# Patient Record
Sex: FEMALE | Race: BLACK OR AFRICAN AMERICAN | Employment: UNEMPLOYED | URBAN - METROPOLITAN AREA
[De-identification: names, ages, dates, MRNs, and addresses within clinical notes are randomized per-mention and may not be internally consistent; named-entity substitution may affect disease eponyms.]

---

## 2017-02-03 ENCOUNTER — OFFICE VISIT (OUTPATIENT)
Dept: INTERNAL MEDICINE CLINIC | Age: 22
End: 2017-02-03

## 2017-02-03 VITALS
BODY MASS INDEX: 30.28 KG/M2 | SYSTOLIC BLOOD PRESSURE: 114 MMHG | HEART RATE: 96 BPM | RESPIRATION RATE: 18 BRPM | TEMPERATURE: 98.6 F | HEIGHT: 66 IN | WEIGHT: 188.4 LBS | OXYGEN SATURATION: 99 % | DIASTOLIC BLOOD PRESSURE: 76 MMHG

## 2017-02-03 RX ORDER — BUSPIRONE HYDROCHLORIDE 5 MG/1
TABLET ORAL
COMMUNITY
End: 2017-02-21 | Stop reason: ALTCHOICE

## 2017-02-03 RX ORDER — ETONOGESTREL AND ETHINYL ESTRADIOL 11.7; 2.7 MG/1; MG/1
INSERT, EXTENDED RELEASE VAGINAL
COMMUNITY
End: 2017-02-21 | Stop reason: ALTCHOICE

## 2017-02-03 NOTE — PROGRESS NOTES
1. Have you been to the ER, urgent care clinic since your last visit? Hospitalized since your last visit? No    2. Have you seen or consulted any other health care providers outside of the [facility] since your last visit? Include any pap smears or colon screening. Yes When: 12/15 [place] [facility]. bladder problems.

## 2017-02-03 NOTE — PROGRESS NOTES
Cherelle Conti is a 24 y.o. female and presents with New Patient and Establish Care  . New pt  No CP, SOB, or edema. Regular menses. . Last pregnancy was 6 wks  Ago (s/p ). Started NuvaRing in December. Not breastfeeding. Sees psychiatry  (Dr. Mary Riggs) and first visit was . Review of Systems  Constitutional: negative for fevers, chills, anorexia and weight loss  Eyes:   negative for visual disturbance and irritation  ENT:   negative for tinnitus,sore throat,nasal congestion,ear pains. hoarseness  Respiratory:  negative for cough, hemoptysis, dyspnea,wheezing  CV:   negative for chest pain, palpitations, lower extremity edema  GI:   negative for nausea, vomiting, diarrhea, abdominal pain,melena  Endo:               negative for polyuria,polydipsia,polyphagia,heat intolerance  Genitourinary: negative for frequency, dysuria and hematuria  Integument:  negative for rash and pruritus  Hematologic:  negative for easy bruising and gum/nose bleeding  Musculoskel: negative for myalgias, arthralgias, back pain, muscle weakness, joint pain  Neurological:  negative for headaches, dizziness, vertigo, memory problems and gait   Behavl/Psych: negative for feelings of anxiety, depression, mood changes    Past Medical History   Diagnosis Date    Anxiety     Depression     PTSD (post-traumatic stress disorder)     Suicidal intent      History reviewed. No pertinent past surgical history. Social History     Social History    Marital status: SINGLE     Spouse name: N/A    Number of children: N/A    Years of education: N/A     Social History Main Topics    Smoking status: Never Smoker    Smokeless tobacco: None    Alcohol use Yes      Comment: occ    Drug use: No    Sexual activity: Yes     Partners: Male     Other Topics Concern    None     Social History Narrative     Current Outpatient Prescriptions   Medication Sig Dispense Refill    busPIRone (BUSPAR) 5 mg tablet Take  by mouth.       ethinyl estradiol-etonogestrel (NUVARING) 0.12-0.015 mg/24 hr vaginal ring by Intravaginal route. No Known Allergies    Objective:  Visit Vitals    /76 (BP 1 Location: Left arm, BP Patient Position: Sitting)    Pulse 96    Temp 98.6 °F (37 °C) (Oral)    Resp 18    Ht 5' 6\" (1.676 m)    Wt 188 lb 6.4 oz (85.5 kg)    LMP 01/09/2017    SpO2 99%    BMI 30.41 kg/m2     Physical Exam:   General appearance - alert, well appearing, and in no distress  Mental status - alert, oriented to person, place, and time  Chest - clear to auscultation, no wheezes, rales or rhonchi, symmetric air entry   Heart - normal rate, regular rhythm, normal S1, S2, no murmurs, rubs, clicks or gallops   Abdomen - soft, nontender, nondistended, no masses or organomegaly  Lymph- no adenopathy palpable  Ext-peripheral pulses normal, no pedal edema, no clubbing or cyanosis  Skin-Warm and dry. no hyperpigmentation, vitiligo, or suspicious lesions  Neuro -alert, oriented, normal speech, no focal findings or movement disorder noted      Results for orders placed or performed during the hospital encounter of 06/13/12   CULTURE, URINE   Result Value Ref Range    Specimen Description: URINE     Special Requests: NO SPECIAL REQUESTS     [place] Count >100,000 COLONIES/mL     Culture result:       MIXED SKIN LAZARO ISOLATED  STREPTOCOCCI, BETA HEMOLYTIC GROUP B (3000 COLONIES/mL)    Report Status 06/15/2012 FINAL    CBC WITH AUTOMATED DIFF   Result Value Ref Range    WBC 11.8 (H) 4.2 - 9.4 K/uL    RBC 4.68 3.93 - 4.90 M/uL    HGB 10.4 (L) 10.8 - 13.3 g/dL    HCT 30.8 (L) 33.4 - 40.4 %    MCV 65.8 (L) 76.9 - 90.6 FL    MCH 22.2 (L) 24.8 - 30.2 PG    MCHC 33.8 31.5 - 34.2 g/dL    RDW 16.5 (H) 12.3 - 14.6 %    PLATELET 268 (L) 089 - 345 K/uL    NEUTROPHILS 65 39 - 74 %    LYMPHOCYTES 24 18 - 50 %    MONOCYTES 8 4 - 11 %    EOSINOPHILS 3 0 - 3 %    BASOPHILS 0 0 - 1 %    ABS. NEUTROPHILS 7.7 (H) 1.8 - 7.5 K/UL    ABS.  LYMPHOCYTES 2.8 1.2 - 3.3 K/UL ABS. MONOCYTES 0.9 (H) 0.2 - 0.7 K/UL    ABS. EOSINOPHILS 0.4 (H) 0.0 - 0.3 K/UL    ABS. BASOPHILS 0.0 0.0 - 0.1 K/UL    DF SMEAR SCANNED     RBC COMMENTS       1+ ANISOCYTOSIS  MICROCYTOSIS PRESENT  1+ HYPOCHROMIA   METABOLIC PANEL, COMPREHENSIVE   Result Value Ref Range    Sodium 140 132 - 141 MMOL/L    Potassium 3.6 3.5 - 5.1 MMOL/L    Chloride 106 97 - 108 MMOL/L    CO2 25 18 - 29 MMOL/L    Anion gap 9 5 - 15 mmol/L    Glucose 82 54 - 117 MG/DL    BUN 11 6 - 20 MG/DL    Creatinine 1.0 0.3 - 1.1 MG/DL    BUN/Creatinine ratio 11 (L) 12 - 20      GFR est AA CANNOT BE CALCULATED >60 ml/min/1.73m2    GFR est non-AA CANNOT BE CALCULATED >60 ml/min/1.73m2    Calcium 8.5 8.5 - 10.1 MG/DL    Bilirubin, total 0.5 0.2 - 1.0 MG/DL    ALT (SGPT) 11 (L) 12 - 78 U/L    AST (SGOT) 10 (L) 15 - 37 U/L    Alk.  phosphatase 82 40 - 120 U/L    Protein, total 7.4 6.4 - 8.2 g/dL    Albumin 3.8 3.5 - 5.0 g/dL    Globulin 3.6 2.0 - 4.0 g/dL    A-G Ratio 1.1 1.1 - 2.2     ACETAMINOPHEN   Result Value Ref Range    ACETAMINOPHEN <2 (L) 10 - 30 ug/mL   SALICYLATE   Result Value Ref Range    SALICYLATE <6.6 (L) 2.8 - 20.0 MG/DL   DRUG SCREEN, URINE   Result Value Ref Range    AMPHETAMINE NEGATIVE  NEGATIVE    BARBITURATES NEGATIVE  NEGATIVE    BENZODIAZEPINE NEGATIVE  NEGATIVE    COCAINE NEGATIVE  NEGATIVE    METHADONE NEGATIVE  NEGATIVE    OPIATES NEGATIVE  NEGATIVE    PCP(PHENCYCLIDINE) NEGATIVE  NEGATIVE    THC (TH-CANNABINOL) NEGATIVE  NEGATIVE    Drug screen comment (NOTE)    URINALYSIS W/ REFLEX CULTURE   Result Value Ref Range    Color YELLOW     Appearance CLEAR     Specific gravity 1.027 1.003 - 1.030      pH (UA) 6.0 5.0 - 8.0      Protein NEGATIVE  NEGATIVE MG/DL    Glucose NEGATIVE  NEGATIVE MG/DL    Ketone NEGATIVE  NEGATIVE MG/DL    Bilirubin NEGATIVE  NEGATIVE    Blood NEGATIVE  NEGATIVE    Urobilinogen 0.2 0.2 - 1.0 EU/DL    Nitrites NEGATIVE  NEGATIVE    Leukocyte Esterase SMALL (A) NEGATIVE    UA:UC IF INDICATED URINE CULTURE ORDERED     WBC 5-10 0 - 4 /HPF    RBC 3-5 0 - 5 /HPF    Epithelial cells 10-20 0 - 5 /LPF    Bacteria 1+ (A) NEGATIVE /HPF    Hyaline cast 0-2 0 - 2   ETHYL ALCOHOL   Result Value Ref Range    ALCOHOL(ETHYL),SERUM <10 <10 MG/DL   GLUCOSE, POC   Result Value Ref Range    Glucose (POC) 82 65 - 105 mg/dL    Performed by Brandi Self    HCG URINE, QL. - POC   Result Value Ref Range    Pregnancy test,urine (POC) NEGATIVE  NEGATIVE   EKG, 12 LEAD, INITIAL   Result Value Ref Range    Ventricular Rate 72 BPM    Atrial Rate 72 BPM    P-R Interval 138 ms    QRS Duration 76 ms    Q-T Interval 378 ms    QTC Calculation (Bezet) 413 ms    Calculated P Axis 60 degrees    Calculated R Axis 81 degrees    Calculated T Axis 47 degrees    Diagnosis       Normal sinus rhythm with sinus arrhythmia  No previous ECGs available  Confirmed by Maurizio Gustafson M.D., Ilda Pinon ([id number]) on 6/14/2012 12:07:46 PM       Assessment/Plan:  No diagnosis found. Orders Placed This Encounter    busPIRone (BUSPAR) 5 mg tablet     Sig: Take  by mouth.  ethinyl estradiol-etonogestrel (NUVARING) 0.12-0.015 mg/24 hr vaginal ring     Sig: by Intravaginal route.          Follow-up Disposition: Not on File  Not seen

## 2017-02-03 NOTE — MR AVS SNAPSHOT
Visit Information Date & Time Provider Department Dept. Phone Encounter #  
 2/3/2017  8:15 AM Becky Retana, [address] [id number] Upcoming Health Maintenance Date Due  
 HPV AGE 9Y-34Y (1 of 3 - Female 3 Dose Series) 8/10/2006 DTaP/Tdap/Td series (1 - Tdap) 8/10/2016 PAP AKA CERVICAL CYTOLOGY 9/1/2019 Allergies as of 2/3/2017  Review Complete On: 2/3/2017 By: Becky Retana MD  
 No Known Allergies Current Immunizations  Never Reviewed No immunizations on file. Not reviewed this visit Vitals BP Pulse Temp Resp Height(growth percentile) Weight(growth percentile) 114/76 (BP 1 Location: Left arm, BP Patient Position: Sitting) 96 98.6 °F (37 °C) (Oral) 18 5' 6\" (1.676 m) 188 lb 6.4 oz (85.5 kg) LMP SpO2 BMI OB Status Smoking Status 01/09/2017 99% 30.41 kg/m2 Having regular periods Never Smoker Vitals History BMI and BSA Data Body Mass Index Body Surface Area  
 30.41 kg/m 2 2 m 2 Preferred Pharmacy Pharmacy Name Phone [id number], [address] [place] [address] [phone] Your Updated Medication List  
  
   
This list is accurate as of: 2/3/17  9:26 AM.  Always use your most recent med list.  
  
  
  
  
 busPIRone 5 mg tablet Commonly known as:  BUSPAR Take  by mouth.  
  
 ethinyl estradiol-etonogestrel 0.12-0.015 mg/24 hr vaginal ring Commonly known as:  NUVARING  
by Intravaginal route. Introducing [facility]! Rosa Maria Chavez introduces [employer] patient portal. Now you can access parts of your medical record, email your doctor's office, and request medication refills online. 1. In your internet browser, go to [email or web address] 2. Click on the First Time User? Click Here link in the Sign In box. You will see the New Member Sign Up page. 3. Enter your YouHelp Access Code exactly as it appears below. You will not need to use this code after youve completed the sign-up process. If you do not sign up before the expiration date, you must request a new code. · YouHelp Access Code: [email or web address] Expires: 5/4/2017  8:42 AM 
 
4. Enter the last four digits of your Social Security Number (xxxx) and Date of Birth (mm/dd/yyyy) as indicated and click Submit. You will be taken to the next sign-up page. 5. Create a Airstonet ID. This will be your YouHelp login ID and cannot be changed, so think of one that is secure and easy to remember. 6. Create a YouHelp password. You can change your password at any time. 7. Enter your Password Reset Question and Answer. This can be used at a later time if you forget your password. 8. Enter your e-mail address. You will receive e-mail notification when new information is available in [address] 9. Click Sign Up. You can now view and download portions of your medical record. 10. Click the Download Summary menu link to download a portable copy of your medical information. If you have questions, please visit the Frequently Asked Questions section of the YouHelp website. Remember, YouHelp is NOT to be used for urgent needs. For medical emergencies, dial 911. Now available from your iPhone and Android! Please provide this summary of care documentation to your next provider. Your primary care clinician is listed as Robert Aldrich. If you have any questions after today's visit, please call [phone].

## 2017-09-12 ENCOUNTER — OFFICE VISIT (OUTPATIENT)
Dept: INTERNAL MEDICINE CLINIC | Age: 22
End: 2017-09-12

## 2017-09-12 VITALS
RESPIRATION RATE: 18 BRPM | WEIGHT: 190 LBS | HEIGHT: 66 IN | DIASTOLIC BLOOD PRESSURE: 70 MMHG | HEART RATE: 88 BPM | SYSTOLIC BLOOD PRESSURE: 110 MMHG | TEMPERATURE: 98.9 F | BODY MASS INDEX: 30.53 KG/M2 | OXYGEN SATURATION: 98 %

## 2017-09-12 DIAGNOSIS — R35.0 URINARY FREQUENCY: ICD-10-CM

## 2017-09-12 DIAGNOSIS — N76.0 ACUTE VAGINITIS: ICD-10-CM

## 2017-09-12 DIAGNOSIS — Z00.00 WELL WOMAN EXAM (NO GYNECOLOGICAL EXAM): Primary | ICD-10-CM

## 2017-09-12 DIAGNOSIS — Z86.2 HISTORY OF ANEMIA: ICD-10-CM

## 2017-09-12 DIAGNOSIS — R63.5 WEIGHT GAIN: ICD-10-CM

## 2017-09-12 LAB
BILIRUB UR QL STRIP: NEGATIVE
GLUCOSE UR-MCNC: NEGATIVE MG/DL
KETONES P FAST UR STRIP-MCNC: NORMAL MG/DL
PH UR STRIP: 7 [PH] (ref 4.6–8)
PROT UR QL STRIP: NORMAL MG/DL
SP GR UR STRIP: 1.02 (ref 1–1.03)
UA UROBILINOGEN AMB POC: NORMAL (ref 0.2–1)
URINALYSIS CLARITY POC: NORMAL
URINALYSIS COLOR POC: NORMAL
URINE BLOOD POC: NEGATIVE
URINE LEUKOCYTES POC: NORMAL
URINE NITRITES POC: NEGATIVE

## 2017-09-12 RX ORDER — FLUCONAZOLE 150 MG/1
TABLET ORAL
COMMUNITY
Start: 2017-06-19 | End: 2017-09-12 | Stop reason: ALTCHOICE

## 2017-09-12 NOTE — PROGRESS NOTES
Pt here for   Chief Complaint   Patient presents with    Urinary Frequency     wnats check for diabetes    Abdominal Pain    Yeast Infection     has recurrent yeast infections     1. Have you been to the ER, urgent care clinic since your last visit? Hospitalized since your last visit? Yes When: Xvaier mcgill 9-9-17    2. Have you seen or consulted any other health care providers outside of the [address] [place] [address] since your last visit? Include any pap smears or colon screening.  No     Pt denies pain at this time      PHQ over the last two weeks 9/12/2017   PHQ Not Done Active Diagnosis of Depression or Bipolar Disorder   Little interest or pleasure in doing things Several days   Feeling down, depressed or hopeless Several days   Total Score PHQ 2 2

## 2017-09-12 NOTE — PROGRESS NOTES
Pt was asked about hx of depression, PTSD. She is not followed by psychiatry, does not currently feel the need to be. She has a support system through friends and family.  She is encouraged to f/u if feels need for psychiatry referral.

## 2017-09-12 NOTE — PROGRESS NOTES
Subjective:   25 y.o. female for Well Woman Check. Patient's last menstrual period was 09/02/2017 (exact date). She is followed by OBGYN at [facility] and is current on pap exam.    She was seen in ED at [facility] recently for urinary frequency and was told that she did NOT have a UTI. No labs or STI testing was done, however, she was recc to f/u with PCP to be tested for DM. She also has a hx of recurrent yeast infections. Urinary frequency: +urgency, wakes several times per night to void. No incontinence. No frequent thirst.    Currently she has small amount of white vaginal discharge with fish like odor. No pruritis. She has occasional lower abdominal cramping, no associated nausea, vomiting (except for recent viral gastroenteritis type illness), no diarrhea or constipation. She uses a nuvaring but has been out x 2 months. She does feel like she was getting more yeast when she was using the nuvaring. Social History: single partner, contraception - none.   Pertinent past medical hstory: current smoker (occ black and milds), no history of HTN, DVT, CAD, DM, liver disease, migraines     Patient Active Problem List   Diagnosis Code    Anxiety F41.9    PTSD (post-traumatic stress disorder) F43.10    Urinary frequency R35.0     Patient Active Problem List    Diagnosis Date Noted    Urinary frequency 09/12/2017    Anxiety     PTSD (post-traumatic stress disorder)      Current Outpatient Prescriptions   Medication Sig Dispense Refill    fluconazole (DIFLUCAN) 150 mg tablet        No Known Allergies  Past Medical History:   Diagnosis Date    Anxiety     Depression     PTSD (post-traumatic stress disorder)     Suicidal intent      Past Surgical History:   Procedure Laterality Date    HX CHOLECYSTECTOMY  2013     Family History   Problem Relation Age of Onset    Hypertension Mother     Psychiatric Disorder Mother     Psychiatric Disorder Sister      Social History   Substance Use Topics    Smoking status: Current Some Day Smoker     Types: Cigarettes    Smokeless tobacco: Never Used      Comment: black and mild every few days    Alcohol use Yes      Comment: 2-3 times  weekly      Review of Systems:   Constitutional:    Negative for fever and chills, negative diaphoresis. HEENT:              Negative for neck pain and stiffness. Eyes:                  Negative for visual disturbance, itching, redness or discharge. Respiratory:        Negative for cough and shortness of breath. Cardiovascular:  Negative for chest pain and palpitations. Gastrointestinal: Negative for nausea, vomiting, abdominal pain, diarrhea and             constipation. Genitourinary:     Negative for dysuria and + frequency. Musculoskeletal: Negative for falls, tenderness and swelling. Skin:                    Negative for rash, masses or lesions. Neurological:       Negative for dizzyness, seizure, loss of consciousness, weakness and numbness. Objective:     Visit Vitals    /70 (BP 1 Location: Left arm, BP Patient Position: Sitting)    Pulse 88    Temp 98.9 °F (37.2 °C) (Oral)    Resp 18    Ht 5' 6\" (1.676 m)    Wt 190 lb (86.2 kg)    LMP 09/02/2017 (Exact Date)    SpO2 98%    BMI 30.67 kg/m2     Gen: Oriented to person, place and time and well-developed, well-nourished and in no distress. HEENT:    Head: normocephalic and atraumatic. Eyes:  EOM are normal. Pupils equal and round. Neck:  Normal range of motion. Neck supple. Cardiovascular: normal rate, regular rhythm and normal heart sounds. Pulmonary/Chest:  Effort normal and breath sounds normal.  No respiratory distress. No wheezes, no rales. Abdominal: soft, normal  bowel sounds. Musculoskeletal:  No edema, no tenderness. No calf tenderness or edema. Neurological:  Alert, oriented to person, place and time. Skin: skin is warm and dry.          Assessment/Plan:   well woman  return annually or prn  Follow-up Disposition:  Return in about 1 year (around 9/12/2018), or if symptoms worsen or fail to improve. 1. Well woman exam (no gynecological exam)  F/u with [facility] if yeast infection resume once nuvaring replaced    2. Urinary frequency    - METABOLIC PANEL, COMPREHENSIVE  - AMB POC URINALYSIS DIP STICK AUTO W/O MICRO  - CULTURE, URINE    3. Acute vaginitis    - NUSWAB VAGINITIS PLUS    4. History of anemia    - CBC W/O DIFF    5. Weight gain  She used to smoke marijuana and attributes quitting this to weight increase  - TSH REFLEX TO T4    I  have discussed the diagnosis with the patient and/or gaurdian and the intended treatment plan as seen in the above orders. Patient and/or gaurdian has provided input and agrees with goals. The patient has received an after-visit summary and questions were answered concerning future plans. I have discussed medication side effects and warnings with the patient and/or gaurdian as well.

## 2017-09-12 NOTE — MR AVS SNAPSHOT
Visit Information Date & Time Provider Department Dept. Phone Encounter #  
 9/12/2017 11:15 AM Charleen Arcos, [address] [id number] Follow-up Instructions Return in about 1 year (around 9/12/2018), or if symptoms worsen or fail to improve. Your Appointments 2/9/2018  8:30 AM  
ROUTINE CARE with Gabriel Wilson MD  
[address] [facility] Appt Note: yearly [place] [address] [place] [address]  
[phone]  
  
   
 [place] [address] Upcoming Health Maintenance Date Due  
 HPV AGE 9Y-34Y (1 of 3 - Female 3 Dose Series) 8/10/2006 DTaP/Tdap/Td series (1 - Tdap) 8/10/2016 PAP AKA CERVICAL CYTOLOGY 9/1/2019 Allergies as of 9/12/2017  Review Complete On: 9/12/2017 By: Morena Fisher LPN No Known Allergies Current Immunizations  Never Reviewed No immunizations on file. Not reviewed this visit You Were Diagnosed With   
  
 Codes Comments Well woman exam (no gynecological exam)    -  Primary ICD-10-CM: Z00.00 ICD-9-CM: V70.0 Urinary frequency     ICD-10-CM: R35.0 ICD-9-CM: 788.41 Acute vaginitis     ICD-10-CM: N76.0 ICD-9-CM: 616.10 History of anemia     ICD-10-CM: Z86.2 ICD-9-CM: V12.3 Weight gain     ICD-10-CM: R63.5 ICD-9-CM: 783.1 Vitals BP Pulse Temp Resp Height(growth percentile) Weight(growth percentile) 110/70 (BP 1 Location: Left arm, BP Patient Position: Sitting) 88 98.9 °F (37.2 °C) (Oral) 18 5' 6\" (1.676 m) 190 lb (86.2 kg) LMP SpO2 BMI OB Status Smoking Status 09/02/2017 (Exact Date) 98% 30.67 kg/m2 Having regular periods Current Some Day Smoker Vitals History BMI and BSA Data Body Mass Index Body Surface Area  
 30.67 kg/m 2 2 m 2 Preferred Pharmacy Pharmacy Name Phone [address] [place] [phone] Your Updated Medication List  
  
   
This list is accurate as of: 9/12/17 11:59 AM.  Always use your most recent med list.  
  
  
  
  
 fluconazole 150 mg tablet Commonly known as:  DIFLUCAN We Performed the Following AMB POC URINALYSIS DIP STICK AUTO W/O MICRO [81882 CPT(R)] CBC W/O DIFF [39614 CPT(R)] METABOLIC PANEL, COMPREHENSIVE [85555 CPT(R)] [address] [id number] Custom] TSH REFLEX TO T4 [[id number] Custom] Follow-up Instructions Return in about 1 year (around 9/12/2018), or if symptoms worsen or fail to improve. Patient Instructions Get your labs fasting: nothing to eat/drink for 8 hours before 1. Will check for bacterial vaginosis as well as STDs 2. Labs will check your blood sugar, kidney, liver, anemia and thyroid labs 3. It is possible that your symptoms are related to your nuvaring, if you notice that once you restart this and you are getting yeast infections again, follow up with your obgyn Introducing [facility]! Calos Mcnally introduces [employer] patient portal. Now you can access parts of your medical record, email your doctor's office, and request medication refills online. 1. In your internet browser, go to [email or web address] 2. Click on the First Time User? Click Here link in the Sign In box. You will see the New Member Sign Up page. 3. Enter your [employer] Access Code exactly as it appears below. You will not need to use this code after youve completed the sign-up process. If you do not sign up before the expiration date, you must request a new code. · [employer] Access Code: [facility]/VIDHYA Expires: 12/11/2017 11:58 AM 
 
4. Enter the last four digits of your Social Security Number (xxxx) and Date of Birth (mm/dd/yyyy) as indicated and click Submit. You will be taken to the next sign-up page. 5. Create a Sport Ngin ID. This will be your Sport Ngin login ID and cannot be changed, so think of one that is secure and easy to remember. 6. Create a Sport Ngin password. You can change your password at any time. 7. Enter your Password Reset Question and Answer. This can be used at a later time if you forget your password. 8. Enter your e-mail address. You will receive e-mail notification when new information is available in [address] 9. Click Sign Up. You can now view and download portions of your medical record. 10. Click the Download Summary menu link to download a portable copy of your medical information. If you have questions, please visit the Frequently Asked Questions section of the Sport Ngin website. Remember, Sport Ngin is NOT to be used for urgent needs. For medical emergencies, dial 911. Now available from your iPhone and Android! Please provide this summary of care documentation to your next provider. Your primary care clinician is listed as Alla Lin. If you have any questions after today's visit, please call [phone].

## 2017-09-12 NOTE — PATIENT INSTRUCTIONS
Get your labs fasting: nothing to eat/drink for 8 hours before    1. Will check for bacterial vaginosis as well as STDs  2. Labs will check your blood sugar, kidney, liver, anemia and thyroid labs  3.  It is possible that your symptoms are related to your nuvaring, if you notice that once you restart this and you are getting yeast infections again, follow up with your obgyn

## 2017-09-13 LAB — BACTERIA UR CULT: NORMAL

## 2017-09-14 ENCOUNTER — HOSPITAL ENCOUNTER (OUTPATIENT)
Dept: LAB | Age: 22
Discharge: HOME | End: 2017-09-14

## 2017-09-14 PROCEDURE — 99001 SPECIMEN HANDLING PT-LAB: CPT | Performed by: NURSE PRACTITIONER

## 2017-09-15 ENCOUNTER — TELEPHONE (OUTPATIENT)
Dept: INTERNAL MEDICINE CLINIC | Age: 22
End: 2017-09-15

## 2017-09-15 DIAGNOSIS — B96.89 BV (BACTERIAL VAGINOSIS): Primary | ICD-10-CM

## 2017-09-15 DIAGNOSIS — N76.0 BV (BACTERIAL VAGINOSIS): Primary | ICD-10-CM

## 2017-09-15 DIAGNOSIS — B37.31 YEAST VAGINITIS: ICD-10-CM

## 2017-09-15 LAB
A VAGINAE DNA VAG QL NAA+PROBE: ABNORMAL SCORE
ALBUMIN SERPL-MCNC: 4.5 G/DL (ref 3.5–5.5)
ALBUMIN/GLOB SERPL: 1.5 {RATIO} (ref 1.2–2.2)
ALP SERPL-CCNC: 67 IU/L (ref 39–117)
ALT SERPL-CCNC: 7 IU/L (ref 0–32)
AST SERPL-CCNC: 15 IU/L (ref 0–40)
BILIRUB SERPL-MCNC: 0.8 MG/DL (ref 0–1.2)
BUN SERPL-MCNC: 9 MG/DL (ref 6–20)
BUN/CREAT SERPL: 11 (ref 9–23)
BVAB2 DNA VAG QL NAA+PROBE: ABNORMAL SCORE
C ALBICANS DNA VAG QL NAA+PROBE: POSITIVE
C GLABRATA DNA VAG QL NAA+PROBE: NEGATIVE
C TRACH RRNA SPEC QL NAA+PROBE: NEGATIVE
CALCIUM SERPL-MCNC: 9.4 MG/DL (ref 8.7–10.2)
CHLORIDE SERPL-SCNC: 99 MMOL/L (ref 96–106)
CO2 SERPL-SCNC: 26 MMOL/L (ref 18–29)
CREAT SERPL-MCNC: 0.85 MG/DL (ref 0.57–1)
ERYTHROCYTE [DISTWIDTH] IN BLOOD BY AUTOMATED COUNT: 15.6 % (ref 12.3–15.4)
GLOBULIN SER CALC-MCNC: 3.1 G/DL (ref 1.5–4.5)
GLUCOSE SERPL-MCNC: 80 MG/DL (ref 65–99)
HCT VFR BLD AUTO: 38.1 % (ref 34–46.6)
HGB BLD-MCNC: 11.8 G/DL (ref 11.1–15.9)
MCH RBC QN AUTO: 23 PG (ref 26.6–33)
MCHC RBC AUTO-ENTMCNC: 31 G/DL (ref 31.5–35.7)
MCV RBC AUTO: 74 FL (ref 79–97)
MEGA1 DNA VAG QL NAA+PROBE: ABNORMAL SCORE
N GONORRHOEA RRNA SPEC QL NAA+PROBE: NEGATIVE
PLATELET # BLD AUTO: 186 X10E3/UL (ref 150–379)
POTASSIUM SERPL-SCNC: 4.4 MMOL/L (ref 3.5–5.2)
PROT SERPL-MCNC: 7.6 G/DL (ref 6–8.5)
RBC # BLD AUTO: 5.13 X10E6/UL (ref 3.77–5.28)
SODIUM SERPL-SCNC: 138 MMOL/L (ref 134–144)
T VAGINALIS RRNA SPEC QL NAA+PROBE: NEGATIVE
WBC # BLD AUTO: 10.7 X10E3/UL (ref 3.4–10.8)

## 2017-09-15 RX ORDER — METRONIDAZOLE 500 MG/1
500 TABLET ORAL 2 TIMES DAILY
Qty: 14 TAB | Refills: 0 | Status: SHIPPED | OUTPATIENT
Start: 2017-09-15 | End: 2017-09-22

## 2017-09-15 RX ORDER — FLUCONAZOLE 150 MG/1
150 TABLET ORAL DAILY
Qty: 1 TAB | Refills: 0 | Status: SHIPPED | OUTPATIENT
Start: 2017-09-15 | End: 2017-09-16

## 2017-09-15 NOTE — TELEPHONE ENCOUNTER
Pt called back, morgan   Reviewed nuswab, labs and meds  No etoh w flagyl, take diflucan after flagyl done  Pt verb understanding

## 2017-10-30 ENCOUNTER — OFFICE VISIT (OUTPATIENT)
Dept: INTERNAL MEDICINE CLINIC | Age: 22
End: 2017-10-30

## 2017-10-30 VITALS
BODY MASS INDEX: 31.18 KG/M2 | WEIGHT: 194 LBS | OXYGEN SATURATION: 100 % | HEART RATE: 98 BPM | TEMPERATURE: 98.7 F | HEIGHT: 66 IN | SYSTOLIC BLOOD PRESSURE: 116 MMHG | DIASTOLIC BLOOD PRESSURE: 81 MMHG | RESPIRATION RATE: 20 BRPM

## 2017-10-30 DIAGNOSIS — H93.8X3 EAR CONGESTION, BILATERAL: ICD-10-CM

## 2017-10-30 DIAGNOSIS — R35.0 URINARY FREQUENCY: Primary | ICD-10-CM

## 2017-10-30 DIAGNOSIS — N76.0 ACUTE VAGINITIS: ICD-10-CM

## 2017-10-30 LAB
BILIRUB UR QL STRIP: NORMAL
GLUCOSE UR-MCNC: NEGATIVE MG/DL
KETONES P FAST UR STRIP-MCNC: NORMAL MG/DL
PH UR STRIP: 6 [PH] (ref 4.6–8)
PROT UR QL STRIP: NORMAL MG/DL
SP GR UR STRIP: 1.03 (ref 1–1.03)
UA UROBILINOGEN AMB POC: NORMAL (ref 0.2–1)
URINALYSIS CLARITY POC: CLEAR
URINALYSIS COLOR POC: YELLOW
URINE BLOOD POC: NEGATIVE
URINE LEUKOCYTES POC: NORMAL
URINE NITRITES POC: NEGATIVE

## 2017-10-30 RX ORDER — ETONOGESTREL AND ETHINYL ESTRADIOL 11.7; 2.7 MG/1; MG/1
INSERT, EXTENDED RELEASE VAGINAL
COMMUNITY
End: 2018-07-12

## 2017-10-30 RX ORDER — FLUTICASONE PROPIONATE 50 MCG
2 SPRAY, SUSPENSION (ML) NASAL
Qty: 1 BOTTLE | Refills: 0 | Status: SHIPPED | OUTPATIENT
Start: 2017-10-30 | End: 2018-10-11

## 2017-10-30 NOTE — PROGRESS NOTES
Subjective: (As above and below)     Chief Complaint   Patient presents with    Urinary Frequency    Ear Pain     Vy Fitzgerald is a 25y.o. year old female who presents for urinary urgency and frequency. No dysuria. She also reports +dyspareunia, vaginal itching, suprapubic discomfort. No noted vaginal discharge. No flank pain or fevers. Has not tried anything otc. Uses nuvaring. Symptoms x 2 weeks    She also reports bilat ear fullness/discomfort x 3 days. No other URI symptoms - she had a \"cold\" recently but those s/s resolved. Reviewed PmHx, RxHx, FmHx, SocHx, AllgHx and updated in chart. Family History   Problem Relation Age of Onset    Hypertension Mother     Psychiatric Disorder Mother     Psychiatric Disorder Sister        Past Medical History:   Diagnosis Date    Anxiety     Depression     PTSD (post-traumatic stress disorder)     Suicidal intent       Social History     Social History    Marital status: SINGLE     Spouse name: N/A    Number of children: N/A    Years of education: N/A     Social History Main Topics    Smoking status: Current Some Day Smoker     Types: Cigarettes    Smokeless tobacco: Never Used      Comment: black and mild every few days    Alcohol use Yes      Comment: 2-3 times  weekly    Drug use: No    Sexual activity: Yes     Partners: Male     Other Topics Concern    None     Social History Narrative    9/12/17: unemployed, 18 mo daughter          Current Outpatient Prescriptions   Medication Sig    ethinyl estradiol-etonogestrel (NUVARING) 0.12-0.015 mg/24 hr vaginal ring by Intravaginal route.  fluticasone (FLONASE) 50 mcg/actuation nasal spray 2 Sprays by Both Nostrils route daily as needed for Rhinitis. No current facility-administered medications for this visit. Review of Systems:   Constitutional:    Negative for fever and chills, negative diaphoresis. HEENT:              Negative for neck pain and stiffness.   Eyes: Negative for visual disturbance, itching, redness or discharge. Respiratory:        Negative for cough and shortness of breath. Cardiovascular:  Negative for chest pain and palpitations. Gastrointestinal: Negative for nausea, vomiting, abdominal pain, diarrhea or constipation. Genitourinary:     Negative for dysuria and frequency. Musculoskeletal: Negative for falls, tenderness and swelling. Skin:                    Negative for rash, masses or lesions. Neurological:       Negative for dizzyness, seizure, loss of consciousness, weakness and numbness. Objective:     Vitals:    10/30/17 1515   BP: 116/81   Pulse: 98   Resp: 20   Temp: 98.7 °F (37.1 °C)   TempSrc: Oral   SpO2: 100%   Weight: 194 lb (88 kg)   Height: 5' 6\" (1.676 m)       Results for orders placed or performed in visit on 10/30/17   AMB POC URINALYSIS DIP STICK AUTO W/O MICRO   Result Value Ref Range    Color (UA POC) Yellow     Clarity (UA POC) Clear     Glucose (UA POC) Negative Negative    Bilirubin (UA POC) 1+ Negative    Ketones (UA POC) Trace Negative    Specific gravity (UA POC) 1.030 1.001 - 1.035    Blood (UA POC) Negative Negative    pH (UA POC) 6.0 4.6 - 8.0    Protein (UA POC) 1+ Negative mg/dL    Urobilinogen (UA POC) 1 mg/dL 0.2 - 1    Nitrites (UA POC) Negative Negative    Leukocyte esterase (UA POC) Trace Negative         Physical Examination: General appearance - alert, well appearing, and in no distress  Ears: normal TM and canal bilat  Chest - clear to auscultation, no wheezes, rales or rhonchi, symmetric air entry  Heart - normal rate, regular rhythm, normal S1, S2, no murmurs, rubs, clicks or gallops  Pelvic - VULVA: normal appearing vulva with no masses, tenderness or lesions      Assessment/ Plan:   Follow-up Disposition:  Return if symptoms worsen or fail to improve. 1. Urinary frequency    - AMB POC URINALYSIS DIP STICK AUTO W/O MICRO  - CULTURE, URINE    2. Acute vaginitis    - NUSWAB VAGINITIS    3.  Ear congestion, bilateral    - fluticasone (FLONASE) 50 mcg/actuation nasal spray; 2 Sprays by Both Nostrils route daily as needed for Rhinitis. Dispense: 1 Bottle; Refill: 0        I have discussed the diagnosis with the patient and the intended plan as seen in the above orders. The patient has received an after-visit summary and questions were answered concerning future plans. Pt conveyed understanding of plan. Medication Side Effects and Warnings were discussed with patient: yes  Patient Labs were reviewed: yes  Patient Past Records were reviewed:  yes    Kathy Bui.  Seun Rucker NP

## 2017-10-30 NOTE — MR AVS SNAPSHOT
Visit Information Date & Time Provider Department Dept. Phone Encounter #  
 10/30/2017  3:45 PM Charleen Blood, [address]  [id number] Follow-up Instructions Return if symptoms worsen or fail to improve. Your Appointments 2/9/2018  8:30 AM  
ROUTINE CARE with Jeannette Krabbe, MD  
[address] [facility] [address] [place] [address]) Appt Note: yearly [place] [address] [facility] [id number]  
[phone]  
  
   
 [place] [address] Upcoming Health Maintenance Date Due  
 HPV AGE 9Y-34Y (1 of 3 - Female 3 Dose Series) 8/10/2006 Pneumococcal 19-64 Medium Risk (1 of 1 - PPSV23) 8/10/2014 DTaP/Tdap/Td series (1 - Tdap) 8/10/2016 PAP AKA CERVICAL CYTOLOGY 9/1/2019 Allergies as of 10/30/2017  Review Complete On: 10/30/2017 By: Linda Reed LPN No Known Allergies Current Immunizations  Never Reviewed No immunizations on file. Not reviewed this visit You Were Diagnosed With   
  
 Codes Comments Urinary frequency    -  Primary ICD-10-CM: R35.0 ICD-9-CM: 788.41 Nasal congestion     ICD-10-CM: R09.81 ICD-9-CM: 478.19 Acute vaginitis     ICD-10-CM: N76.0 ICD-9-CM: 616.10 Vitals BP Pulse Temp Resp Height(growth percentile) Weight(growth percentile) 116/81 (BP 1 Location: Left arm, BP Patient Position: Sitting) 98 98.7 °F (37.1 °C) (Oral) 20 5' 6\" (1.676 m) 194 lb (88 kg) LMP SpO2 BMI OB Status Smoking Status 10/07/2017 100% 31.31 kg/m2 Having regular periods Current Some Day Smoker Vitals History BMI and BSA Data Body Mass Index Body Surface Area  
 31.31 kg/m 2 2.02 m 2 Preferred Pharmacy Pharmacy Name Phone [address] [phone] Your Updated Medication List  
  
   
This list is accurate as of: 10/30/17  3:57 PM.  Always use your most recent med list.  
  
  
  
  
 fluticasone 50 mcg/actuation nasal spray Commonly known as:  Izabel Reges 2 Sprays by Both Nostrils route daily as needed for Rhinitis. NUVARING 0.12-0.015 mg/24 hr vaginal ring Generic drug:  ethinyl estradiol-etonogestrel  
by Intravaginal route. Prescriptions Sent to Pharmacy Refills  
 fluticasone (FLONASE) 50 mcg/actuation nasal spray 0 Si Sprays by Both Nostrils route daily as needed for Rhinitis. Class: Normal  
 Pharmacy: [facility] , [address]  #: [phone] Route: Both Nostrils We Performed the Following AMB POC URINALYSIS DIP STICK AUTO W/O MICRO [89535 CPT(R)] CULTURE, URINE [id number] CPT(R)] NUSWAB VAGINITIS [[id number] Custom] Follow-up Instructions Return if symptoms worsen or fail to improve. Introducing \[facility]\"" & HEALTH SERVICES! [facility] introduces [employer] patient portal. Now you can access parts of your medical record, email your doctor's office, and request medication refills online. 1. In your internet browser, go to [email or web address] 2. Click on the First Time User? Click Here link in the Sign In box. You will see the New Member Sign Up page. 3. Enter your [employer] Access Code exactly as it appears below. You will not need to use this code after youve completed the sign-up process. If you do not sign up before the expiration date, you must request a new code. · the grafterhart Access Code: [email or web address]/[email or web address] Expires: 2017 11:58 AM 
 
4. Enter the last four digits of your Social Security Number (xxxx) and Date of Birth (mm/dd/yyyy) as indicated and click Submit. You will be taken to the next sign-up page. 5. Create a "[employer]"t ID. This will be your "[employer]"t login ID and cannot be changed, so think of one that is secure and easy to remember. 6. Create a "[employer]"t password. You can change your password at any time. 7. Enter your Password Reset Question and Answer. This can be used at a later time if you forget your password. 8. Enter your e-mail address. You will receive e-mail notification when new information is available in [address] 9. Click Sign Up. You can now view and download portions of your medical record. 10. Click the Download Summary menu link to download a portable copy of your medical information. If you have questions, please visit the Frequently Asked Questions section of the [employer] website. Remember, [employer] is NOT to be used for urgent needs. For medical emergencies, dial 911. Now available from your iPhone and Android! Please provide this summary of care documentation to your next provider. Your primary care clinician is listed as Kiran Wilkes. If you have any questions after today's visit, please call [phone].

## 2017-10-30 NOTE — PROGRESS NOTES
1. Have you been to the ER, urgent care clinic since your last visit? Hospitalized since your last visit? No.    2. Have you seen or consulted any other health care providers outside of the [address] [place] [address] since your last visit? Include any pap smears or colon screening.  No.

## 2017-10-31 LAB — BACTERIA UR CULT: NO GROWTH

## 2017-11-02 ENCOUNTER — TELEPHONE (OUTPATIENT)
Dept: INTERNAL MEDICINE CLINIC | Age: 22
End: 2017-11-02

## 2017-11-02 DIAGNOSIS — B37.31 YEAST VAGINITIS: Primary | ICD-10-CM

## 2017-11-02 LAB
A VAGINAE DNA VAG QL NAA+PROBE: ABNORMAL SCORE
BVAB2 DNA VAG QL NAA+PROBE: ABNORMAL SCORE
C ALBICANS DNA VAG QL NAA+PROBE: POSITIVE
C GLABRATA DNA VAG QL NAA+PROBE: NEGATIVE
MEGA1 DNA VAG QL NAA+PROBE: ABNORMAL SCORE
T VAGINALIS RRNA SPEC QL NAA+PROBE: NEGATIVE

## 2017-11-02 RX ORDER — FLUCONAZOLE 150 MG/1
150 TABLET ORAL DAILY
Qty: 1 TAB | Refills: 0 | Status: SHIPPED | OUTPATIENT
Start: 2017-11-02 | End: 2017-11-03

## 2018-07-12 ENCOUNTER — OFFICE VISIT (OUTPATIENT)
Dept: INTERNAL MEDICINE CLINIC | Age: 23
End: 2018-07-12

## 2018-07-12 VITALS
TEMPERATURE: 97.5 F | SYSTOLIC BLOOD PRESSURE: 101 MMHG | WEIGHT: 208.1 LBS | BODY MASS INDEX: 33.44 KG/M2 | HEART RATE: 103 BPM | DIASTOLIC BLOOD PRESSURE: 75 MMHG | RESPIRATION RATE: 18 BRPM | HEIGHT: 66 IN | OXYGEN SATURATION: 96 %

## 2018-07-12 DIAGNOSIS — R35.0 URINARY FREQUENCY: ICD-10-CM

## 2018-07-12 DIAGNOSIS — B37.31 YEAST VAGINITIS: ICD-10-CM

## 2018-07-12 DIAGNOSIS — R10.9 FLANK PAIN: Primary | ICD-10-CM

## 2018-07-12 LAB
BILIRUB UR QL STRIP: NORMAL
GLUCOSE UR-MCNC: NEGATIVE MG/DL
KETONES P FAST UR STRIP-MCNC: NEGATIVE MG/DL
PH UR STRIP: 6.5 [PH] (ref 4.6–8)
PROT UR QL STRIP: NORMAL
SP GR UR STRIP: 1.02 (ref 1–1.03)
UA UROBILINOGEN AMB POC: NORMAL (ref 0.2–1)
URINALYSIS CLARITY POC: CLEAR
URINALYSIS COLOR POC: NORMAL
URINE BLOOD POC: NEGATIVE
URINE LEUKOCYTES POC: NORMAL
URINE NITRITES POC: NEGATIVE

## 2018-07-12 RX ORDER — MEDROXYPROGESTERONE ACETATE 150 MG/ML
150 INJECTION, SUSPENSION INTRAMUSCULAR ONCE
COMMUNITY
End: 2018-10-11

## 2018-07-12 NOTE — PROGRESS NOTES
Subjective: (As above and below)     Chief Complaint   Patient presents with    Urinary Pain     wants to check for Kidney Pain    Flank Pain     on and off     Lela Rao is a 25y.o. year old female who presents for bilateral flank pain and urinary frequency. She states that she went to [facility] ED approx 2 months ago and was treated for a UTI, she completed abx but continued to have symptoms - she then went to [facility] approx 1 month ago and was recc to f/u with PCP to r/o kidney stones as they did not have that imaging there. She reports frequency of urination - wakes up frequently to void, she reports bilateral flank pain which is \"gripping\" in nature. No dysuria. No back injury. Pain does not radiate. No fevers. No gross hematuria. She gets recurrent yeast infections. Is followed by obygn    No cycles - on depo    Reviewed PmHx, RxHx, FmHx, SocHx, AllgHx and updated in chart. Family History   Problem Relation Age of Onset    Hypertension Mother     Psychiatric Disorder Mother     Psychiatric Disorder Sister        Past Medical History:   Diagnosis Date    Anxiety     Depression     PTSD (post-traumatic stress disorder)     Suicidal intent       Social History     Social History    Marital status: SINGLE     Spouse name: N/A    Number of children: N/A    Years of education: N/A     Social History Main Topics    Smoking status: Current Some Day Smoker     Types: Cigarettes    Smokeless tobacco: Never Used      Comment: black and mild every few days    Alcohol use Yes      Comment: 2-3 times  weekly    Drug use: No    Sexual activity: Yes     Partners: Male     Other Topics Concern    None     Social History Narrative    9/12/17: unemployed, 18 mo daughter          Current Outpatient Prescriptions   Medication Sig    medroxyPROGESTERone (DEPO-PROVERA) 150 mg/mL syrg 150 mg by IntraMUSCular route once.     ethinyl estradiol-etonogestrel (NUVARING) 0.12-0.015 mg/24 hr vaginal ring by Intravaginal route.  fluticasone (FLONASE) 50 mcg/actuation nasal spray 2 Sprays by Both Nostrils route daily as needed for Rhinitis. No current facility-administered medications for this visit. Review of Systems:   Constitutional:    Negative for fever and chills, negative diaphoresis. HEENT:              Negative for neck pain and stiffness. Eyes:                  Negative for visual disturbance, itching, redness or discharge. Respiratory:        Negative for cough and shortness of breath. Cardiovascular:  Negative for chest pain and palpitations. Gastrointestinal: Negative for nausea, vomiting, abdominal pain, diarrhea or constipation. Genitourinary:     Negative for dysuria and frequency. Musculoskeletal: Negative for falls, tenderness and swelling. Skin:                    Negative for rash, masses or lesions. Neurological:       Negative for dizzyness, seizure, loss of consciousness, weakness and numbness.      Objective:     Vitals:    07/12/18 1151   BP: 101/75   Pulse: (!) 103   Resp: 18   Temp: 97.5 °F (36.4 °C)   TempSrc: Oral   SpO2: 96%   Weight: 208 lb 1.6 oz (94.4 kg)   Height: 5' 6\" (1.676 m)       Results for orders placed or performed in visit on 07/12/18   AMB POC URINALYSIS DIP STICK AUTO W/O MICRO   Result Value Ref Range    Color (UA POC) Carmita     Clarity (UA POC) Clear     Glucose (UA POC) Negative Negative    Bilirubin (UA POC) 1+ Negative    Ketones (UA POC) Negative Negative    Specific gravity (UA POC) 1.025 1.001 - 1.035    Blood (UA POC) Negative Negative    pH (UA POC) 6.5 4.6 - 8.0    Protein (UA POC) 1+ Negative    Urobilinogen (UA POC) 1 mg/dL 0.2 - 1    Nitrites (UA POC) Negative Negative    Leukocyte esterase (UA POC) Trace Negative         Physical Examination: General appearance - alert, well appearing, and in no distress  Chest - clear to auscultation, no wheezes, rales or rhonchi, symmetric air entry  Heart - normal rate, regular rhythm, normal S1, S2, no murmurs, rubs, clicks or gallops  Abdomen - bowel sounds normal, no abdominal pain on exam, mild suprapubic discomfort w/ palpation  Back exam - full range of motion, no tenderness, palpable spasm or pain on motion. No flank pain on palpation      Assessment/ Plan:   Follow-up Disposition:  Return if symptoms worsen or fail to improve. 1. Flank pain    - AMB POC URINALYSIS DIP STICK AUTO W/O MICRO  - CT ABD PELV WO CONT; Future    2. Yeast vaginitis  Recurrent yeast (none now) - monthly  - HEMOGLOBIN A1C WITH EAG    3. Urinary frequency    - HEMOGLOBIN A1C WITH EAG        I have discussed the diagnosis with the patient and the intended plan as seen in the above orders. The patient has received an after-visit summary and questions were answered concerning future plans. Pt conveyed understanding of plan. Medication Side Effects and Warnings were discussed with patient: yes  Patient Labs were reviewed: yes  Patient Past Records were reviewed:  yes    Georges Giles.  Ashley Archer NP

## 2018-07-12 NOTE — PATIENT INSTRUCTIONS
Flank Pain: Care Instructions Your Care Instructions Flank pain is pain on the side of the back just below the rib cage and above the waist. It can be on one or both sides. Flank pain has many possible causes, including a kidney stone, a urinary tract infection, or back strain. Flank pain may get better on its own. But don't ignore new symptoms, such as fever, nausea and vomiting, urination problems, pain that gets worse, and dizziness. These may be signs of a more serious problem. You may have to have tests or other treatment. Follow-up care is a key part of your treatment and safety. Be sure to make and go to all appointments, and call your doctor if you are having problems. It's also a good idea to know your test results and keep a list of the medicines you take. How can you care for yourself at home? · Rest until you feel better. · Take pain medicines exactly as directed. ¨ If the doctor gave you a prescription medicine for pain, take it as prescribed. ¨ If you are not taking a prescription pain medicine, ask your doctor if you can take an over-the-counter pain medicine, such as acetaminophen (Tylenol), ibuprofen (Advil, Motrin), or naproxen (Aleve). Read and follow all instructions on the label. · If your doctor prescribed antibiotics, take them as directed. Do not stop taking them just because you feel better. You need to take the full course of antibiotics. · To apply heat, put a warm water bottle, a heating pad set on low, or a warm cloth on the painful area. Do not go to sleep with a heating pad on your skin. · To prevent dehydration, drink plenty of fluids, enough so that your urine is light yellow or clear like water. Choose water and other caffeine-free clear liquids until you feel better. If you have kidney, heart, or liver disease and have to limit fluids, talk with your doctor before you increase the amount of fluids you drink. When should you call for help?  
Call your doctor now or seek immediate medical care if: 
  · Your flank pain gets worse.  
  · You have new symptoms, such as fever, nausea, or vomiting.  
  · You have symptoms of a urinary problem. For example: ¨ You have blood or pus in your urine. ¨ You have chills or body aches. ¨ It hurts to urinate. ¨ You have groin or belly pain.  
 Watch closely for changes in your health, and be sure to contact your doctor if you do not get better as expected. Where can you learn more? Go to http://salvatore-david.info/. Enter S191 in the search box to learn more about \"Flank Pain: Care Instructions. \" Current as of: November 20, 2017 Content Version: 11.7 © 1902-1453 [employer]. Care instructions adapted under license by [employer] (which disclaims liability or warranty for this information). If you have questions about a medical condition or this instruction, always ask your healthcare professional. [place] [address] any warranty or liability for your use of this information.

## 2018-07-12 NOTE — PROGRESS NOTES
Pt here for   Chief Complaint   Patient presents with    Urinary Pain     wants to check for Kidney Pain    Flank Pain     on and off     1. Have you been to the ER, urgent care clinic since your last visit? Hospitalized since your last visit? No    2. Have you seen or consulted any other health care providers outside of the [address] [place] [address] since your last visit? Include any pap smears or colon screening.  No       Pt denies pain at this time      PHQ over the last two weeks 7/12/2018   PHQ Not Done Active Diagnosis of Depression or Bipolar Disorder   Little interest or pleasure in doing things Several days   Feeling down, depressed or hopeless Several days   Total Score PHQ 2 2

## 2018-07-17 ENCOUNTER — HOSPITAL ENCOUNTER (OUTPATIENT)
Dept: CT IMAGING | Age: 23
Discharge: HOME | End: 2018-07-17
Attending: NURSE PRACTITIONER
Payer: COMMERCIAL

## 2018-07-17 DIAGNOSIS — R10.9 FLANK PAIN: ICD-10-CM

## 2018-07-17 PROCEDURE — 74176 CT ABD & PELVIS W/O CONTRAST: CPT

## 2018-07-18 ENCOUNTER — TELEPHONE (OUTPATIENT)
Dept: INTERNAL MEDICINE CLINIC | Age: 23
End: 2018-07-18

## 2018-07-18 DIAGNOSIS — M62.838 MUSCLE SPASM: Primary | ICD-10-CM

## 2018-07-18 RX ORDER — CYCLOBENZAPRINE HCL 5 MG
5 TABLET ORAL
Qty: 20 TAB | Refills: 0 | Status: SHIPPED | OUTPATIENT
Start: 2018-07-18 | End: 2018-10-11

## 2018-07-18 NOTE — TELEPHONE ENCOUNTER
Pt called back  morgan   Reviewed ct scan  She has no urinary symptoms apart from some frequency at night - reminded her to get A1c lab  She has on and off side pain, continues to be gripping in nature, will send flexeril to see if this helps  Advised pt call [facility]  Reviewed meds

## 2018-07-18 NOTE — TELEPHONE ENCOUNTER
Left message  Calling about CT scan  Normal, no kidney stones  Flank pain could be msk, but if continued urinary symptoms would recc urology consult  Will mail letter

## 2018-10-11 ENCOUNTER — OFFICE VISIT (OUTPATIENT)
Dept: INTERNAL MEDICINE CLINIC | Age: 23
End: 2018-10-11

## 2018-10-11 VITALS
TEMPERATURE: 98.9 F | HEART RATE: 109 BPM | HEIGHT: 66 IN | DIASTOLIC BLOOD PRESSURE: 80 MMHG | BODY MASS INDEX: 33.93 KG/M2 | OXYGEN SATURATION: 99 % | RESPIRATION RATE: 18 BRPM | SYSTOLIC BLOOD PRESSURE: 122 MMHG | WEIGHT: 211.1 LBS

## 2018-10-11 DIAGNOSIS — N76.0 ACUTE VAGINITIS: Primary | ICD-10-CM

## 2018-10-11 PROBLEM — N64.4 BREAST PAIN: Status: ACTIVE | Noted: 2018-10-11

## 2018-10-11 PROBLEM — R35.0 URINARY FREQUENCY: Status: RESOLVED | Noted: 2017-09-12 | Resolved: 2018-10-11

## 2018-10-11 NOTE — PROGRESS NOTES
Pt here for Chief Complaint Patient presents with  Exposure to STD  Breast pain 1. Have you been to the ER, urgent care clinic since your last visit? Hospitalized since your last visit? No 
 
2. Have you seen or consulted any other health care providers outside of the [address] [place] [address] since your last visit? Include any pap smears or colon screening. No  
 
 
Pt denies pain at this time PHQ over the last two weeks 10/11/2018 PHQ Not Done - Little interest or pleasure in doing things Several days Feeling down, depressed, irritable, or hopeless Several days Total Score PHQ 2 2

## 2018-10-11 NOTE — MR AVS SNAPSHOT
[address] [facility] [address] 
[phone] Patient: Cammie Fabian MRN: [id number] :1995 Visit Information Date & Time Provider Department Dept. Phone Encounter #  
 10/11/2018  7:45 AM Charleen Thompson Monik, [address] [id number] Follow-up Instructions Return if symptoms worsen or fail to improve. Your Appointments 2018  1:00 PM  
New Patient with Jimbo Su MD  
[facility] [address]) Appt Note: new pt for depression,ptsd, and anxiety; pt made appt; told to arrive at 12:30  
 [address] [facility] [address] [facility] Garima Cochran [address]  
  
   
 [address] [facility] [address] [facility] 7 [address] Upcoming Health Maintenance Date Due  
 HPV Age 9Y-34Y (1 of 1 - Female 3 Dose Series) 8/10/2006 Pneumococcal 19-64 Medium Risk (1 of 1 - PPSV23) 8/10/2014 DTaP/Tdap/Td series (1 - Tdap) 8/10/2016 Influenza Age 5 to Adult 2018 PAP AKA CERVICAL CYTOLOGY 2019 Allergies as of 10/11/2018  Review Complete On: 10/11/2018 By: Cole Clarke LPN No Known Allergies Current Immunizations  Never Reviewed No immunizations on file. Not reviewed this visit You Were Diagnosed With   
  
 Codes Comments Acute vaginitis    -  Primary ICD-10-CM: N76.0 ICD-9-CM: 616.10 Vitals BP Pulse Temp Resp Height(growth percentile) Weight(growth percentile) 122/80 (BP 1 Location: Left arm, BP Patient Position: Sitting) (!) 109 98.9 °F (37.2 °C) (Oral) 18 5' 6\" (1.676 m) 211 lb 1.6 oz (95.8 kg) LMP SpO2 BMI OB Status Smoking Status 2018 99% 34.07 kg/m2 Chemically Induced Current Some Day Smoker BMI and BSA Data Body Mass Index Body Surface Area 34.07 kg/m 2 2.11 m 2 Preferred Pharmacy Pharmacy Name Phone [address] [facility] [phone] Your Updated Medication List  
  
Notice  As of 10/11/2018  8:19 AM  
 You have not been prescribed any medications. We Performed the Following [address] [id number] Custom] Follow-up Instructions Return if symptoms worsen or fail to improve. Patient Instructions Bacterial Vaginosis: Care Instructions Your Care Instructions Bacterial vaginosis is a type of vaginal infection. It is caused by excess growth of certain bacteria that are normally found in the vagina. Symptoms can include itching, swelling, pain when you urinate or have sex, and a gray or yellow discharge with a \"fishy\" odor. It is not considered an infection that is spread through sexual contact. Although symptoms can be annoying and uncomfortable, bacterial vaginosis does not usually cause other health problems. However, if you have it while you are pregnant, it can cause complications. While the infection may go away on its own, most doctors use antibiotics to treat it. You may have been prescribed pills or vaginal cream. With treatment, bacterial vaginosis usually clears up in 5 to 7 days. Follow-up care is a key part of your treatment and safety. Be sure to make and go to all appointments, and call your doctor if you are having problems. It's also a good idea to know your test results and keep a list of the medicines you take. How can you care for yourself at home? · Take your antibiotics as directed. Do not stop taking them just because you feel better. You need to take the full course of antibiotics. · Do not eat or drink anything that contains alcohol if you are taking metronidazole (Flagyl). · Keep using your medicine if you start your period. Use pads instead of tampons while using a vaginal cream or suppository. Tampons can absorb the medicine. · Wear loose cotton clothing. Do not wear nylon and other materials that hold body heat and moisture close to the skin. · Do not scratch. Relieve itching with a cold pack or a cool bath. · Do not wash your vaginal area more than once a day. Use plain water or a mild, unscented soap. Do not douche. When should you call for help? Watch closely for changes in your health, and be sure to contact your doctor if: 
  · You have unexpected vaginal bleeding.  
  · You have a fever.  
  · You have new or increased pain in your vagina or pelvis.  
  · You are not getting better after 1 week.  
  · Your symptoms return after you finish the course of your medicine. Where can you learn more? Go to http://salvatore-david.info/. Bryan Franklin in the search box to learn more about \"Bacterial Vaginosis: Care Instructions. \" Current as of: May 15, 2018 Content Version: 11.8 © 1537-4296 [employer]. Care instructions adapted under license by [employer] (which disclaims liability or warranty for this information). If you have questions about a medical condition or this instruction, always ask your healthcare professional. [place] [address] any warranty or liability for your use of this information. Introducing [facility]! Dear Khloe Azul: 
Thank you for requesting a [employer] account. Our records indicate that you already have an active [employer] account. You can access your account anytime at [email or web address] Did you know that you can access your hospital and ER discharge instructions at any time in [employer]? You can also review all of your test results from your hospital stay or ER visit. Additional Information If you have questions, please visit the Frequently Asked Questions section of the [employer] website at [email or web address] Remember, [employer] is NOT to be used for urgent needs.  For medical emergencies, dial 911. Now available from your iPhone and Android! Please provide this summary of care documentation to your next provider. Your primary care clinician is listed as Rosemary Yu. If you have any questions after today's visit, please call [phone].

## 2018-10-11 NOTE — PROGRESS NOTES
Subjective: (As above and below) Chief Complaint Patient presents with  Exposure to STD  Breast pain  
 
Pete Diaz is a 21y.o. year old female who presents for STD testing due to concerns over condom breaking. She denies symptoms apart from a fish like odor which occurred after she douched Breast pain: since changing birth control - from depo to oral contraceptives - noted to occur approx 2 weeks prior to cycle. No other breast symptoms. She does drink a moderate amount of caffeine She was treated by an urgent care recently for abscess under left axilla - it is improving. She has recurrent abscesses Reviewed PmHx, RxHx, FmHx, SocHx, AllgHx and updated in chart. Family History Problem Relation Age of Onset  Hypertension Mother  Psychiatric Disorder Mother  Psychiatric Disorder Sister Past Medical History:  
Diagnosis Date  Anxiety  Depression  PTSD (post-traumatic stress disorder)  Suicidal intent Social History Social History  Marital status: SINGLE Spouse name: N/A  
 Number of children: N/A  
 Years of education: N/A Social History Main Topics  Smoking status: Current Some Day Smoker Types: Cigarettes  Smokeless tobacco: Never Used Comment: black and mild every few days  Alcohol use Yes Comment: 2-3 times  weekly  Drug use: No  
 Sexual activity: Yes  
  Partners: Male Other Topics Concern  None Social History Narrative 9/12/17: unemployed, 18 mo daughter No current outpatient prescriptions on file. No current facility-administered medications for this visit. Review of Systems:  
Constitutional:    Negative for fever and chills, negative diaphoresis. HEENT:              Negative for neck pain and stiffness. Eyes:                  Negative for visual disturbance, itching, redness or discharge. Respiratory:        Negative for cough and shortness of breath. Cardiovascular:  Negative for chest pain and palpitations. Gastrointestinal: Negative for nausea, vomiting, abdominal pain, diarrhea or constipation. Genitourinary:     Negative for dysuria and frequency. Musculoskeletal: Negative for falls, tenderness and swelling. Skin:                    Negative for rash, masses or lesions. Neurological:       Negative for dizzyness, seizure, loss of consciousness, weakness and numbness. Objective:  
 
Vitals:  
 10/11/18 0758 BP: 122/80 Pulse: (!) 109 Resp: 18 Temp: 98.9 °F (37.2 °C) TempSrc: Oral  
SpO2: 99% Weight: 211 lb 1.6 oz (95.8 kg) Height: 5' 6\" (1.676 m) Results for orders placed or performed in visit on 07/12/18 AMB POC URINALYSIS DIP STICK AUTO W/O MICRO Result Value Ref Range Color (UA POC) CIT Group Clarity (UA POC) Clear Glucose (UA POC) Negative Negative Bilirubin (UA POC) 1+ Negative Ketones (UA POC) Negative Negative Specific gravity (UA POC) 1.025 1.001 - 1.035 Blood (UA POC) Negative Negative pH (UA POC) 6.5 4.6 - 8.0 Protein (UA POC) 1+ Negative Urobilinogen (UA POC) 1 mg/dL 0.2 - 1 Nitrites (UA POC) Negative Negative Leukocyte esterase (UA POC) Trace Negative Physical Examination: General appearance - alert, well appearing, and in no distress and overweight Chest - clear to auscultation, no wheezes, rales or rhonchi, symmetric air entry Heart - normal rate, regular rhythm, normal S1, S2, no murmurs, rubs, clicks or gallops Pelvic - VULVA: normal appearing vulva with no masses, tenderness or lesions Extremities - no pedal edema noted Skin - left axilla: normal 
 
Assessment/ Plan:  
Follow-up Disposition: 
Return if symptoms worsen or fail to improve. Discussed referral to gen surg if abscess returns Discussed avoiding douching Breast pain: try limiting caffeine, f/u w/ obgyn INI 1.  Acute vaginitis 
 
- NUSWAB VAGINITIS PLUS 
 
 
 
 I have discussed the diagnosis with the patient and the intended plan as seen in the above orders. The patient has received an after-visit summary and questions were answered concerning future plans. Pt conveyed understanding of plan. Medication Side Effects and Warnings were discussed with patient: yes Patient Labs were reviewed: yes Patient Past Records were reviewed:  yes Charleen Camara, NP

## 2018-10-11 NOTE — PATIENT INSTRUCTIONS
Bacterial Vaginosis: Care Instructions Your Care Instructions Bacterial vaginosis is a type of vaginal infection. It is caused by excess growth of certain bacteria that are normally found in the vagina. Symptoms can include itching, swelling, pain when you urinate or have sex, and a gray or yellow discharge with a \"fishy\" odor. It is not considered an infection that is spread through sexual contact. Although symptoms can be annoying and uncomfortable, bacterial vaginosis does not usually cause other health problems. However, if you have it while you are pregnant, it can cause complications. While the infection may go away on its own, most doctors use antibiotics to treat it. You may have been prescribed pills or vaginal cream. With treatment, bacterial vaginosis usually clears up in 5 to 7 days. Follow-up care is a key part of your treatment and safety. Be sure to make and go to all appointments, and call your doctor if you are having problems. It's also a good idea to know your test results and keep a list of the medicines you take. How can you care for yourself at home? · Take your antibiotics as directed. Do not stop taking them just because you feel better. You need to take the full course of antibiotics. · Do not eat or drink anything that contains alcohol if you are taking metronidazole (Flagyl). · Keep using your medicine if you start your period. Use pads instead of tampons while using a vaginal cream or suppository. Tampons can absorb the medicine. · Wear loose cotton clothing. Do not wear nylon and other materials that hold body heat and moisture close to the skin. · Do not scratch. Relieve itching with a cold pack or a cool bath. · Do not wash your vaginal area more than once a day. Use plain water or a mild, unscented soap. Do not douche. When should you call for help? Watch closely for changes in your health, and be sure to contact your doctor if:   · You have unexpected vaginal bleeding.  
  · You have a fever.  
  · You have new or increased pain in your vagina or pelvis.  
  · You are not getting better after 1 week.  
  · Your symptoms return after you finish the course of your medicine. Where can you learn more? Go to http://salvatore-david.info/. Alan Vale in the search box to learn more about \"Bacterial Vaginosis: Care Instructions. \" Current as of: May 15, 2018 Content Version: 11.8 © 0377-3183 [employer] Publishing. Care instructions adapted under license by [employer] (which disclaims liability or warranty for this information). If you have questions about a medical condition or this instruction, always ask your healthcare professional. [address] any warranty or liability for your use of this information.

## 2018-10-17 LAB
A VAGINAE DNA VAG QL NAA+PROBE: NORMAL SCORE
BVAB2 DNA VAG QL NAA+PROBE: NORMAL SCORE
C ALBICANS DNA VAG QL NAA+PROBE: NEGATIVE
C GLABRATA DNA VAG QL NAA+PROBE: NEGATIVE
C TRACH RRNA SPEC QL NAA+PROBE: NEGATIVE
MEGA1 DNA VAG QL NAA+PROBE: NORMAL SCORE
N GONORRHOEA RRNA SPEC QL NAA+PROBE: NEGATIVE
T VAGINALIS RRNA SPEC QL NAA+PROBE: NEGATIVE

## 2018-10-18 NOTE — PROGRESS NOTES
10-18-18 Called patient @ [phone] no answer left message for patient to return writers call @ [phone] Sherin Brennan LPN

## 2019-02-07 ENCOUNTER — OFFICE VISIT (OUTPATIENT)
Dept: INTERNAL MEDICINE CLINIC | Age: 24
End: 2019-02-07

## 2019-02-07 VITALS
DIASTOLIC BLOOD PRESSURE: 78 MMHG | SYSTOLIC BLOOD PRESSURE: 119 MMHG | OXYGEN SATURATION: 96 % | TEMPERATURE: 98.6 F | RESPIRATION RATE: 18 BRPM | WEIGHT: 216.1 LBS | HEART RATE: 102 BPM | HEIGHT: 66 IN | BODY MASS INDEX: 34.73 KG/M2

## 2019-02-07 DIAGNOSIS — Z20.2 EXPOSURE TO CHLAMYDIA: Primary | ICD-10-CM

## 2019-02-07 RX ORDER — AZITHROMYCIN 500 MG/1
1000 TABLET, FILM COATED ORAL
Qty: 2 TAB | Refills: 0 | Status: SHIPPED | OUTPATIENT
Start: 2019-02-07 | End: 2019-02-07

## 2019-02-07 RX ORDER — CEFTRIAXONE 250 MG/8ML
250 INJECTION, POWDER, FOR SOLUTION INTRAMUSCULAR; INTRAVENOUS ONCE
Qty: 1 VIAL | Refills: 0 | Status: SHIPPED | COMMUNITY
Start: 2019-02-07 | End: 2019-02-07

## 2019-02-07 NOTE — PATIENT INSTRUCTIONS
Safer Sex: Care Instructions  Your Care Instructions  Safer sex is a way to reduce your risk of getting an infection spread through sex. It can also help prevent pregnancy. Most infections that are spread through sex, also called sexually transmitted infections or STIs, can be cured. But some can decrease your chances of getting pregnant if they are not treated early. Others, such as herpes, have no cure. And some, such as HIV, can be deadly. Several products can help you practice safer sex and reduce your chance of STIs. One of the best is a condom. There are condoms for men and for women. The female condom is a tube of soft plastic with a closed end that is placed deep into the vagina. You can use a special rubber sheet (dental dam) for protection during oral sex. Latex gloves can keep your hands from touching blood, semen, or other body fluids that can carry infections. Remember that birth control methods such as diaphragms, IUDs, foams, and birth control pills do not stop you from getting STIs. Follow-up care is a key part of your treatment and safety. Be sure to make and go to all appointments, and call your doctor if you are having problems. It's also a good idea to know your test results and keep a list of the medicines you take. How can you care for yourself at home? · Think about getting shots to prevent hepatitis A and hepatitis B. These two diseases can be spread through sex. You also can get hepatitis A if you eat infected food. · Use condoms or female condoms each time and every time you have sex. · Learn the right way to use a male condom:  ? Condoms come in several sizes. Make sure you use the right size. A condom that is too small can break easily. A condom that is too big can slip off during sex. Use a new condom each time you have sex. ? Be careful not to poke a hole in the condom when you open the wrapper. ? Squeeze the tip of the condom to keep out air.   ? Pull down the loose skin (foreskin) from the head of an uncircumcised penis. ? While squeezing the tip of the condom, unroll it all the way down to the base of the firm penis. ? Never use petroleum jelly (such as Vaseline), grease, hand lotion, baby oil, or anything with oil in it. These products can make holes in the condom. ? After sex, hold the condom on your penis as you remove your penis from your partner. This will keep semen from spilling out of the condom. · Learn to use a female condom:  ? You can put in a female condom up to 8 hours before sex. ? Squeeze the smaller ring at the closed end and insert it deep into the vagina. The larger ring at the open end should stay outside the vagina. ? During sex, make sure the penis goes into the condom. ? After the penis is removed, close the open end of the condom by twisting it. Remove the condom. · Do not use a female condom and male condom at the same time. · Do not have sex with anyone who has symptoms of an STI, such as sores on the genitals or mouth. The herpes virus that causes cold sores can spread to and from the penis and vagina. · Do not drink a lot of alcohol or use drugs before sex. This can cause you to let down your guard and not practice safer sex. · Having one sex partner (who does not have STIs and does not have sex with anyone else) is a sure way to avoid STIs. · Talk to your partner before you have sex. Find out if he or she has or is at risk for any STI. Keep in mind that a person may be able to spread an STI even if he or she does not have symptoms. You and your partner may want to get an HIV test. You should get tested again 6 months later. Where can you learn more? Go to http://salvatore-david.info/. Enter M553 in the search box to learn more about \"Safer Sex: Care Instructions. \"  Current as of: September 11, 2018  Content Version: 11.9  © 2210-1682 [employer], [place].  Care instructions adapted under license by [employer] (which disclaims liability or warranty for this information). If you have questions about a medical condition or this instruction, always ask your healthcare professional. [address] any warranty or liability for your use of this information.

## 2019-02-07 NOTE — PROGRESS NOTES
Pt here for   Chief Complaint   Patient presents with    Exposure to STD     Male friend told her to get check for clamydia       1. Have you been to the ER, urgent care clinic since your last visit? Hospitalized since your last visit? No    2. Have you seen or consulted any other health care providers outside of the [address] [place] [address] since your last visit? Include any pap smears or colon screening.  No           Pt denies pain at this time            PHQ over the last two weeks 2/7/2019   PHQ Not Done -   Little interest or pleasure in doing things Not at all   Feeling down, depressed, irritable, or hopeless Not at all   Total Score PHQ 2 0

## 2019-02-07 NOTE — PROGRESS NOTES
Subjective: (As above and below)     Chief Complaint   Patient presents with    Exposure to STD     Male friend told her to get check for clamydia     Eliane Ga is a 21y.o. year old female who presents for STD exposure. She was told by a recent partner 1 week ago that she needed to be treated for chlamydia, apparently he then backtracked and said he was okay. Nacho Rogers She states that the condom fell off during intercourse. She is on her cycle now, denies pregnancy  no other discharge noted. Some itching. Reviewed PmHx, RxHx, FmHx, SocHx, AllgHx and updated in chart. Family History   Problem Relation Age of Onset    Hypertension Mother     Psychiatric Disorder Mother     Psychiatric Disorder Sister        Past Medical History:   Diagnosis Date    Anxiety     Depression     PTSD (post-traumatic stress disorder)     Suicidal intent       Social History     Socioeconomic History    Marital status: SINGLE     Spouse name: Not on file    Number of children: Not on file    Years of education: Not on file    Highest education level: Not on file   Tobacco Use    Smoking status: Current Some Day Smoker     Types: Cigarettes    Smokeless tobacco: Never Used    Tobacco comment: black and mild every few days   Substance and Sexual Activity    Alcohol use: Yes     Comment: 2-3 times  weekly    Drug use: No    Sexual activity: Yes     Partners: Male   Social History Narrative    9/12/17: unemployed, 18 mo daughter          No current outpatient medications on file. No current facility-administered medications for this visit. Review of Systems:   Constitutional:    Negative for fever and chills, negative diaphoresis. HEENT:              Negative for neck pain and stiffness. Eyes:                  Negative for visual disturbance, itching, redness or discharge. Respiratory:        Negative for cough and shortness of breath. Cardiovascular:  Negative for chest pain and palpitations. Gastrointestinal: Negative for nausea, vomiting, abdominal pain, diarrhea or constipation. Genitourinary:     Negative for dysuria and frequency. Musculoskeletal: Negative for falls, tenderness and swelling. Skin:                    Negative for rash, masses or lesions. Neurological:       Negative for dizzyness, seizure, loss of consciousness, weakness and numbness. Objective:     Vitals:    02/07/19 1135   BP: 119/78   Pulse: (!) 102   Resp: 18   Temp: 98.6 °F (37 °C)   TempSrc: Oral   SpO2: 96%   Weight: 216 lb 1.6 oz (98 kg)   Height: 5' 6\" (1.676 m)           Physical Examination: General appearance - alert, well appearing, and in no distress  Chest - clear to auscultation, no wheezes, rales or rhonchi, symmetric air entry  Heart - normal rate, regular rhythm, normal S1, S2, no murmurs, rubs, clicks or gallops      Assessment/ Plan:   Follow-up Disposition: Not on File  Discussed option to test and wait vs treat for gonorrhea and chlamydia now. She chooses to receive treatment today. Discussed safer sex      1. Exposure to chlamydia    - azithromycin (ZITHROMAX) 500 mg tab; Take 2 Tabs by mouth now for 1 dose. Dispense: 2 Tab; Refill: 0  - cefTRIAXone (ROCEPHIN) 250 mg injection; 250 mg by IntraMUSCular route once for 1 dose. Dispense: 1 Vial; Refill: 0  - NUSWAB VAGINITIS PLUS        I have discussed the diagnosis with the patient and the intended plan as seen in the above orders. The patient has received an after-visit summary and questions were answered concerning future plans. Pt conveyed understanding of plan. Medication Side Effects and Warnings were discussed with patient: yes  Patient Labs were reviewed: yes  Patient Past Records were reviewed:  yes    Bert Celaya.  Shawna Carmona NP

## 2019-02-10 LAB
A VAGINAE DNA VAG QL NAA+PROBE: ABNORMAL SCORE
BVAB2 DNA VAG QL NAA+PROBE: ABNORMAL SCORE
C ALBICANS DNA VAG QL NAA+PROBE: POSITIVE
C GLABRATA DNA VAG QL NAA+PROBE: NEGATIVE
C TRACH RRNA SPEC QL NAA+PROBE: NEGATIVE
MEGA1 DNA VAG QL NAA+PROBE: ABNORMAL SCORE
N GONORRHOEA RRNA SPEC QL NAA+PROBE: NEGATIVE
T VAGINALIS RRNA SPEC QL NAA+PROBE: NEGATIVE

## 2019-02-11 DIAGNOSIS — B37.31 YEAST VAGINITIS: Primary | ICD-10-CM

## 2019-02-11 RX ORDER — FLUCONAZOLE 150 MG/1
150 TABLET ORAL DAILY
Qty: 1 TAB | Refills: 0 | Status: SHIPPED | OUTPATIENT
Start: 2019-02-11 | End: 2019-02-12

## 2019-03-01 RX ORDER — FLUCONAZOLE 150 MG/1
150 TABLET ORAL DAILY
Qty: 1 TAB | Refills: 0 | Status: SHIPPED | OUTPATIENT
Start: 2019-03-01 | End: 2019-03-02

## 2019-03-12 ENCOUNTER — OFFICE VISIT (OUTPATIENT)
Dept: INTERNAL MEDICINE CLINIC | Age: 24
End: 2019-03-12

## 2019-03-12 VITALS
HEIGHT: 66 IN | DIASTOLIC BLOOD PRESSURE: 71 MMHG | SYSTOLIC BLOOD PRESSURE: 115 MMHG | RESPIRATION RATE: 18 BRPM | TEMPERATURE: 99.5 F | HEART RATE: 92 BPM | BODY MASS INDEX: 35.53 KG/M2 | WEIGHT: 221.1 LBS | OXYGEN SATURATION: 96 %

## 2019-03-12 DIAGNOSIS — N63.0 BREAST MASS IN FEMALE: Primary | ICD-10-CM

## 2019-03-12 DIAGNOSIS — N92.6 IRREGULAR MENSES: ICD-10-CM

## 2019-03-12 PROBLEM — E66.01 SEVERE OBESITY (HCC): Status: ACTIVE | Noted: 2019-03-12

## 2019-03-12 LAB
HCG URINE, QL. (POC): NEGATIVE
VALID INTERNAL CONTROL?: YES

## 2019-03-12 NOTE — PROGRESS NOTES
Pt here for   Chief Complaint   Patient presents with    Breast Mass     right breast     1. Have you been to the ER, urgent care clinic since your last visit? Hospitalized since your last visit? No    2. Have you seen or consulted any other health care providers outside of the [address] [place] [address] since your last visit? Include any pap smears or colon screening.  No         Pt denies pain at this time      3 most recent PHQ Screens 3/12/2019   PHQ Not Done -   Little interest or pleasure in doing things Not at all   Feeling down, depressed, irritable, or hopeless Not at all   Total Score PHQ 2 0

## 2019-03-12 NOTE — PROGRESS NOTES
Subjective: (As above and below)     Chief Complaint   Patient presents with    Breast Mass     right breast     Lucía Lucero is a 21y.o. year old female who presents for right breast problem    approx 2 months ago she noticed a non-tender lump to right breast - approx 11 o'clock. Over the past 2 months she states it seems to be getting larger. No nipple discharge, skin changes. She has been off birth control x 3 months and cycles are somewhat irregular she is starting to spot today. No family hx of breast ca known. A few months ago she had some episodes of bilateral breast pains - reduced caffeine, symptoms improved      Reviewed PmHx, RxHx, FmHx, SocHx, AllgHx and updated in chart. Family History   Problem Relation Age of Onset    Hypertension Mother     Psychiatric Disorder Mother     Psychiatric Disorder Sister        Past Medical History:   Diagnosis Date    Anxiety     Depression     PTSD (post-traumatic stress disorder)     Suicidal intent       Social History     Socioeconomic History    Marital status: SINGLE     Spouse name: Not on file    Number of children: Not on file    Years of education: Not on file    Highest education level: Not on file   Tobacco Use    Smoking status: Current Some Day Smoker     Types: Cigarettes    Smokeless tobacco: Never Used    Tobacco comment: black and mild every few days   Substance and Sexual Activity    Alcohol use: Yes     Comment: 2-3 times  weekly    Drug use: No    Sexual activity: Yes     Partners: Male   Social History Narrative    9/12/17: unemployed, 18 mo daughter          No current outpatient medications on file. No current facility-administered medications for this visit. Review of Systems:   Constitutional:    Negative for fever and chills, negative diaphoresis. HEENT:              Negative for neck pain and stiffness. Eyes:                  Negative for visual disturbance, itching, redness or discharge.    Respiratory: Negative for cough and shortness of breath. Cardiovascular:  Negative for chest pain and palpitations. Gastrointestinal: Negative for nausea, vomiting, abdominal pain, diarrhea or constipation. Genitourinary:     Negative for dysuria and frequency. Musculoskeletal: Negative for falls, tenderness and swelling. Skin:                    Negative for rash, masses or lesions. Neurological:       Negative for dizzyness, seizure, loss of consciousness, weakness and numbness. Objective:     Vitals:    03/12/19 1121   BP: 115/71   Pulse: 92   Resp: 18   Temp: 99.5 °F (37.5 °C)   TempSrc: Oral   SpO2: 96%   Weight: 221 lb 1.6 oz (100.3 kg)   Height: 5' 6\" (1.676 m)       Results for orders placed or performed in visit on 03/12/19   AMB POC URINE PREGNANCY TEST, VISUAL COLOR COMPARISON   Result Value Ref Range    VALID INTERNAL CONTROL POC Yes     HCG urine, Ql. (POC) Negative Negative         Physical Examination: General appearance - alert, well appearing, and in no distress and overweight  Chest - clear to auscultation, no wheezes, rales or rhonchi, symmetric air entry  Heart - normal rate, regular rhythm, normal S1, S2, no murmurs, rubs, clicks or gallops  Breasts - small pea sized spongy mobile knot felt to right breast 11 o'clock. No nipple discharge, skin changes     Assessment/ Plan:   Follow-up Disposition: Not on File    1. Breast mass in female    - [facility]  RT; Future    2. Irregular menses  Is followed by OBGYN, has not liked any of the birth control methods thus far (depo, pill, nexplanon)  - AMB POC URINE PREGNANCY TEST, VISUAL COLOR COMPARISON        I have discussed the diagnosis with the patient and the intended plan as seen in the above orders. The patient has received an after-visit summary and questions were answered concerning future plans. Pt conveyed understanding of plan.       Medication Side Effects and Warnings were discussed with patient: yes  Patient Labs were reviewed: yes  Patient Past Records were reviewed:  yes    Abhilash Garica.  Slime Jorge NP

## 2019-03-12 NOTE — PATIENT INSTRUCTIONS
Breast Lumps: Care Instructions  Your Care Instructions  Breast lumps are common, especially in women between ages 27 and 48. Many women's breasts feel lumpy and tender before their menstrual period. Women also may have lumps when they are breastfeeding. Breast lumps may go away after menopause. All new breast lumps in women after menopause should be checked by a doctor. Although lumps may be normal for you, it is important to have your doctor check any lump or thickness that is not like the rest of your breast to make sure it is not cancer. A lump may be larger, harder, or different from the rest of your breast tissue. Follow-up care is a key part of your treatment and safety. Be sure to make and go to all appointments, and call your doctor if you are having problems. It's also a good idea to know your test results and keep a list of the medicines you take. How can you care for yourself at home? · Make an appointment to have a mammogram and other follow-up visits as recommended by your doctor. When should you call for help? Watch closely for changes in your health, and be sure to contact your doctor if:    · You do not get better as expected.     · Your breast has changed.     · You have pain in your breast.     · You have a discharge from your nipple.     · A breast lump changes or does not go away. Where can you learn more? Go to http://salvatore-david.info/. Enter V497 in the search box to learn more about \"Breast Lumps: Care Instructions. \"  Current as of: May 14, 2018  Content Version: 11.9  © 4360-3939 [employer]. Care instructions adapted under license by [employer] (which disclaims liability or warranty for this information). If you have questions about a medical condition or this instruction, always ask your healthcare professional. [address] any warranty or liability for your use of this information.

## 2019-03-26 ENCOUNTER — HOSPITAL ENCOUNTER (OUTPATIENT)
Dept: MAMMOGRAPHY | Age: 24
Discharge: HOME | End: 2019-03-26
Attending: NURSE PRACTITIONER
Payer: COMMERCIAL

## 2019-03-26 DIAGNOSIS — N63.0 BREAST MASS IN FEMALE: ICD-10-CM

## 2019-03-26 PROCEDURE — 88360 TUMOR IMMUNOHISTOCHEM/MANUAL: CPT

## 2019-03-26 PROCEDURE — 76642 ULTRASOUND BREAST LIMITED: CPT

## 2019-03-26 PROCEDURE — 74011250636 HC RX REV CODE- 250/636: Performed by: RADIOLOGY

## 2019-03-26 PROCEDURE — 19083 BX BREAST 1ST LESION US IMAG: CPT

## 2019-03-26 PROCEDURE — 88305 TISSUE EXAM BY PATHOLOGIST: CPT

## 2019-03-26 PROCEDURE — 74011000250 HC RX REV CODE- 250: Performed by: RADIOLOGY

## 2019-03-26 RX ORDER — LIDOCAINE HYDROCHLORIDE 10 MG/ML
5 INJECTION INFILTRATION; PERINEURAL
Status: COMPLETED | OUTPATIENT
Start: 2019-03-26 | End: 2019-03-26

## 2019-03-26 RX ORDER — LIDOCAINE HYDROCHLORIDE AND EPINEPHRINE 10; 10 MG/ML; UG/ML
20 INJECTION, SOLUTION INFILTRATION; PERINEURAL ONCE
Status: COMPLETED | OUTPATIENT
Start: 2019-03-26 | End: 2019-03-26

## 2019-03-26 RX ADMIN — LIDOCAINE HYDROCHLORIDE 5 ML: 10 INJECTION, SOLUTION INFILTRATION; PERINEURAL at 10:30

## 2019-03-26 RX ADMIN — LIDOCAINE HYDROCHLORIDE,EPINEPHRINE BITARTRATE 200 MG: 10; .01 INJECTION, SOLUTION INFILTRATION; PERINEURAL at 10:30

## 2019-03-26 NOTE — PROGRESS NOTES
Patient tolerated right breast biopsy well with minimal bleeding. Biopsy site was bandaged in the usual fashion and discharge instructions were reviewed with the patient. She was provided with a written copy as well. Advised patient that results should be available by Thursday and that she will receive a phone call in the afternoon. Encouraged her to call with any questions or concerns.

## 2019-03-26 NOTE — DISCHARGE INSTRUCTIONS
Breast Biopsy Discharge Instructions    PAIN CONTROL     You may have mild discomfort; take 1-2 Tylenol every 4-6 hours as needed.  Do not take aspirin containing products or anti-inflammatory medications (Advil, Aleve, Motrin, Ibuprofen, etc.) for 24 hours.  Wearing a comfortable bra for support may help with discomfort. WOUND CARE      A small amount of bleeding or bruising at the biopsy site is normal. Watch for signs and symptoms of infections: skin warm to touch, yellowish drainage from wound, fever or severe pain.  Place an ice pack over the site hourly, 20-30 at a time for a few hours today.  The clear dressing is water resistant (you may shower, but do not allow the dressing to become saturated). You may remove the dressing in 48 hours. The steri-strips (small pieces of tape covering the incision) will fall off on their own in a few days. If the clear dressing irritates your skin or begins to peel off, you may remove it. Remember, if you remove the clear dressing before 48 hours, you should not get the site wet.  If at any point you have EXCESSIVE BLEEDING (saturating the gauze under the clear dressing) OR pain please call:    Daytime 7:30am-5:00pm: [facility] [phone]    After Hours:    [phone] (ask to speak to a radiologist)              or [facility] [phone]    ACTIVITY     Do not participate in any strenuous activities for 24 hours (exercise, sports, housework, etc.).  You may resume work (light duty only) for the first 24 hours.  No heavy lifting with the arm on the affected side of your body. ADDITIONAL INSTRUCTIONS    We will call you with results on Thursday March 28 in the afternoon.       [address]    MD: Kwame Phillips  RN: Guillaume Sexton  Radiology Tech: Keyonna Almonte

## 2019-03-28 NOTE — PROGRESS NOTES
Dr. Marty Mcnally spoke with patient regarding pathology results. Called patient with appointment information, Dr. Herb Oro at the [address] [facility] office on 4/5 at 10:30, arrival time 10am. Patient was provided with office location and contact information. She states that the biopsy site is healing well and she has no concerns. Encouraged her to call with any questions.

## 2019-03-29 ENCOUNTER — OFFICE VISIT (OUTPATIENT)
Dept: INTERNAL MEDICINE CLINIC | Age: 24
End: 2019-03-29

## 2019-03-29 VITALS
WEIGHT: 217.1 LBS | SYSTOLIC BLOOD PRESSURE: 117 MMHG | RESPIRATION RATE: 18 BRPM | DIASTOLIC BLOOD PRESSURE: 78 MMHG | BODY MASS INDEX: 34.89 KG/M2 | HEART RATE: 93 BPM | OXYGEN SATURATION: 98 % | HEIGHT: 66 IN | TEMPERATURE: 98.7 F

## 2019-03-29 DIAGNOSIS — N92.6 IRREGULAR MENSES: ICD-10-CM

## 2019-03-29 DIAGNOSIS — Z13.228 SCREENING FOR METABOLIC DISORDER: ICD-10-CM

## 2019-03-29 DIAGNOSIS — C50.911 CARCINOMA OF RIGHT FEMALE BREAST, UNSPECIFIED ESTROGEN RECEPTOR STATUS, UNSPECIFIED SITE OF BREAST (HCC): Primary | ICD-10-CM

## 2019-03-29 NOTE — PROGRESS NOTES
Pt here for Chief Complaint Patient presents with  Abnormal Lab Results Discuss mamogram results 1. Have you been to the ER, urgent care clinic since your last visit? Hospitalized since your last visit? No 
 
2. Have you seen or consulted any other health care providers outside of the [address] [place] [address] since your last visit? Include any pap smears or colon screening. No  
 
 
Pt denies pain at this time 3 most recent PHQ Screens 3/29/2019 PHQ Not Done - Little interest or pleasure in doing things Not at all Feeling down, depressed, irritable, or hopeless Not at all Total Score PHQ 2 0

## 2019-03-30 PROBLEM — C50.919 BREAST CARCINOMA (HCC): Status: ACTIVE | Noted: 2019-03-30

## 2019-03-30 PROBLEM — N64.4 BREAST PAIN: Status: RESOLVED | Noted: 2018-10-11 | Resolved: 2019-03-30

## 2019-03-30 NOTE — PROGRESS NOTES
Subjective: (As above and below) Chief Complaint Patient presents with  Abnormal Lab Results Discuss mamogram results Estelita Mathis is a 21y.o. year old female who presents to discuss breast US results, prior to this appointment she was notified of results by radiology. She presents with her mother. An US of right breast was ordered for a mass that she found on self-exam that was enlarging over a 2 month period. Biopsy shows carcinoma. She has an appt w/ breast surgery on 4/5. No family hx of breast cancer. It has been a year since routine labs, will order today as well She states she is doing \"okay\". She states that she had a bad day yesterday but today is doing okay. She is followed by OBGYN, asks that results be forwarded to their office. She denies any pain. She has irregular menses, is not currently on any contraceptives. She has recurrent yeast infections and asks if this has anything to do w/ diagnosis. - she was ordered diabetes screening in the summer but did not get labs yet. Reviewed PmHx, RxHx, FmHx, SocHx, AllgHx and updated in chart. Family History Problem Relation Age of Onset  Hypertension Mother  Psychiatric Disorder Mother  Psychiatric Disorder Sister Past Medical History:  
Diagnosis Date  Anxiety  Depression  PTSD (post-traumatic stress disorder)  Suicidal intent Social History Socioeconomic History  Marital status: SINGLE Spouse name: Not on file  Number of children: Not on file  Years of education: Not on file  Highest education level: Not on file Tobacco Use  Smoking status: Current Some Day Smoker Types: Cigarettes  Smokeless tobacco: Never Used  Tobacco comment: black and mild every few days Substance and Sexual Activity  Alcohol use: Yes Comment: 2-3 times  weekly  Drug use: No  
 Sexual activity: Yes  
  Partners: Male Social History Narrative 9/12/17: unemployed, 18 mo daughter No current outpatient medications on file. No current facility-administered medications for this visit. Review of Systems:  
Constitutional:    Negative for fever and chills, negative diaphoresis. HEENT:              Negative for neck pain and stiffness. Eyes:                  Negative for visual disturbance, itching, redness or discharge. Respiratory:        Negative for cough and shortness of breath. Cardiovascular:  Negative for chest pain and palpitations. Gastrointestinal: Negative for nausea, vomiting, abdominal pain, diarrhea or constipation. Genitourinary:     Negative for dysuria and frequency. Musculoskeletal: Negative for falls, tenderness and swelling. Skin:                    Negative for rash, masses or lesions. Neurological:       Negative for dizzyness, seizure, loss of consciousness, weakness and numbness. Objective:  
 
Vitals:  
 03/29/19 1534 BP: 117/78 Pulse: 93 Resp: 18 Temp: 98.7 °F (37.1 °C) TempSrc: Oral  
SpO2: 98% Weight: 217 lb 1.6 oz (98.5 kg) Height: 5' 6\" (1.676 m) Results for orders placed or performed in visit on 03/12/19 AMB POC URINE PREGNANCY TEST, VISUAL COLOR COMPARISON Result Value Ref Range VALID INTERNAL CONTROL POC Yes HCG urine, Ql. (POC) Negative Negative Physical Examination: General appearance - alert, well appearing, and in no distress Chest - clear to auscultation, no wheezes, rales or rhonchi, symmetric air entry Heart - normal rate, regular rhythm, normal S1, S2, no murmurs, rubs, clicks or gallops Skin: biopsy site is healing well. No redness/drainage Assessment/ Plan:  
 
Previous hemoglobin A1c lab reprinted that was ordered for previous c/o recurrent yeast infections Will forward results to her OBGYN per request 
Unable to answer specific questions about treatment plan, will f/u w/ breast surgery Advised pt follow up if anything needed from our office veronika in regards to depression/anxiety. She has a good support system at home 1. Carcinoma of right female breast, unspecified estrogen receptor status, unspecified site of breast ([facility] [address] Has f/u w/ breast surgery on 4/5 
 
- CBC WITH AUTOMATED DIFF 2. Irregular menses Is est w/ OBGYN, discussed avoidance of pregnancy at this time - METABOLIC PANEL, COMPREHENSIVE 
- CBC WITH AUTOMATED DIFF 
- TSH 3RD GENERATION 
- AMB POC URINE PREGNANCY TEST, VISUAL COLOR COMPARISON 3. Screening for metabolic disorder - METABOLIC PANEL, COMPREHENSIVE 
- CBC WITH AUTOMATED DIFF I have discussed the diagnosis with the patient and the intended plan as seen in the above orders. The patient has received an after-visit summary and questions were answered concerning future plans. Pt conveyed understanding of plan. Medication Side Effects and Warnings were discussed with patient: yes Patient Labs were reviewed: yes Patient Past Records were reviewed:  yes Charleen Macedo NP

## 2019-04-05 ENCOUNTER — DOCUMENTATION ONLY (OUTPATIENT)
Dept: SURGERY | Age: 24
End: 2019-04-05

## 2019-04-05 ENCOUNTER — OFFICE VISIT (OUTPATIENT)
Dept: SURGERY | Age: 24
End: 2019-04-05

## 2019-04-05 VITALS
HEIGHT: 66 IN | WEIGHT: 217 LBS | HEART RATE: 100 BPM | BODY MASS INDEX: 34.87 KG/M2 | DIASTOLIC BLOOD PRESSURE: 79 MMHG | SYSTOLIC BLOOD PRESSURE: 118 MMHG

## 2019-04-05 DIAGNOSIS — C50.411 MALIGNANT NEOPLASM OF UPPER-OUTER QUADRANT OF RIGHT FEMALE BREAST, UNSPECIFIED ESTROGEN RECEPTOR STATUS (HCC): Primary | ICD-10-CM

## 2019-04-05 NOTE — PROGRESS NOTES
HISTORY OF PRESENT ILLNESS  Surjit Orellana is a 21 y.o. female. HPI NEW patient referral for consultation by JACE Baker NP for a new RIGHT breast cancer. She has a palpable RIGHT breast lump. She denies any nipple inversion or discharge. The lump led to a biopsy that revealed breast cancer. OB History        1    Para        Term                AB        Living           SAB        TAB        Ectopic        Molar        Multiple        Live Births              Obstetric Comments   Menarche 8, LMP 3/22/2019, # of children 1, age of 4st delivery 21, Hysterectomy/oophorectomy no/no, Breast bx yes, right 3/2019, history of breast feeding yes, BCP yes, Hormone therapy na           No family history of breast or ovarian cancer. Ultrasound - 3/26/19  1. BI-RADS Assessment Category 4: Suspicious abnormality - Biopsy should be  performed in the absence of clinical contraindication. 2.4 cm solid right breast  mass, representing a suspicious, solid mass. Ultrasound-guided core needle  biopsy is recommended.     The patient has been notified of these results and recommendations. We have  informed the office of the referring physician. At the patient's request, we  will proceed with biopsy.     Past Medical History:   Diagnosis Date    Anxiety     Depression     PTSD (post-traumatic stress disorder)     Suicidal intent      Past Surgical History:   Procedure Laterality Date    HX CHOLECYSTECTOMY       Social History     Socioeconomic History    Marital status: SINGLE     Spouse name: Not on file    Number of children: Not on file    Years of education: Not on file    Highest education level: Not on file   Occupational History    Not on file   Social Needs    Financial resource strain: Not on file    Food insecurity:     Worry: Not on file     Inability: Not on file    Transportation needs:     Medical: Not on file     Non-medical: Not on file   Tobacco Use    Smoking status: Current Some Day Smoker     Types: Cigarettes    Smokeless tobacco: Never Used    Tobacco comment: black and mild every few days   Substance and Sexual Activity    Alcohol use: Yes     Comment: 2-3 times  weekly    Drug use: No    Sexual activity: Yes     Partners: Male   Lifestyle    Physical activity:     Days per week: Not on file     Minutes per session: Not on file    Stress: Not on file   Relationships    Social connections:     Talks on phone: Not on file     Gets together: Not on file     Attends Jainism service: Not on file     Active member of club or organization: Not on file     Attends meetings of clubs or organizations: Not on file     Relationship status: Not on file    Intimate partner violence:     Fear of current or ex partner: Not on file     Emotionally abused: Not on file     Physically abused: Not on file     Forced sexual activity: Not on file   Other Topics Concern    Not on file   Social History Narrative    17: unemployed, 25 mo daughter     OB History        1    Para        Term                AB        Living           SAB        TAB        Ectopic        Molar        Multiple        Live Births              Obstetric Comments   Menarche 8, LMP 3/22/2019, # of children 1, age of 4st delivery 21, Hysterectomy/oophorectomy no/no, Breast bx yes, right 3/2019, history of breast feeding yes, BCP yes, Hormone therapy na           No current outpatient medications on file. No Known Allergies    Review of Systems   Constitutional: Negative. HENT: Negative. Eyes: Negative. Respiratory: Negative. Cardiovascular: Negative. Gastrointestinal: Negative. Genitourinary: Negative. Musculoskeletal: Negative. Skin: Negative. Neurological: Negative. Endo/Heme/Allergies: Negative. Psychiatric/Behavioral: Negative. All other systems reviewed and are negative.        ESTROGEN AND PROGESTERONE RECEPTOR ASSAY   Invasive Component   Estrogen receptor 35% Positive with weak intensity. Progesterone receptor 0% Negative. Interpretation   HER-2/luz Immunohistochemistry (IHC)   Results: 2+, Score = equivocal   FINAL PATHOLOGIC DIAGNOSIS   Breast, right, biopsy:   Invasive poorly differentiated carcinoma. See comment. Comment   ADDENDUM PENDING for hormone receptor status and HER-2 protein overexpression by immunohistochemistry. The lesion is minimally 1.2 cm in greatest dimension. HER 2 FISH - negative. Physical Exam   Constitutional: She is oriented to person, place, and time. She appears well-developed and well-nourished. HENT:   Head: Normocephalic. Eyes: EOM are normal.   Neck: Neck supple. No thyromegaly present. Cardiovascular: Intact distal pulses. Pulmonary/Chest: Effort normal and breath sounds normal. Right breast exhibits mass (mass 10:00 ). Right breast exhibits no inverted nipple, no nipple discharge, no skin change and no tenderness. Left breast exhibits no inverted nipple, no mass, no nipple discharge, no skin change and no tenderness. Breasts are symmetrical.   Abdominal: Soft. Bowel sounds are normal.   Musculoskeletal: Normal range of motion. Lymphadenopathy:     She has no cervical adenopathy. She has no axillary adenopathy. Neurological: She is alert and oriented to person, place, and time. Skin: Skin is warm. Nursing note and vitals reviewed. ASSESSMENT and PLAN    ICD-10-CM ICD-9-CM    1. Malignant neoplasm of upper-outer quadrant of right female breast, unspecified estrogen receptor status ([facility] [address] C50.411 174.4 MRI BREAST BI W WO CONT      BRAC-ANALYSIS     22 yo female with right breast   T2 N0 IDC er wp pr neg her 2 luz negative  Poorly differentiated carcinoma  I have reviewed the imaging and pathology with her and she was given copies of these reports. 90 minutes were spent face-to-face with the patient during this encounter and  90% of that time was spent on counseling and coordination of care.     1. Discussed lumpectomy and radiation vs mastectomy. Discussed reconstruction. MRI ordered to see if patient is a candidate for a lumpectomy. 2. Discussed sentinel lymph node biopsy. 3. Discussed external beam radiation. 4. Discussed hormone therapy. 5. Discussed the possibility of chemotherapy. 6. Discussed genetic testing    Will send gene panel and mammaprint  Will schedule mri and appointment with med onc. I will also have her see plastics. If no gene mutation could technically have lumpectomy. I will need to see her back after test results come in.

## 2019-04-05 NOTE — LETTER
2019 1:42 PM 
 
Patient:  Shankar Adler YOB: 1995 Date of Visit: 2019 Dear Raysa Boles. Lakeshia Bryant, [address] [facility] [phone] VIA In Basket 
 : Thank you for referring Ms. Shankar Adler to me for evaluation/treatment. Below are the relevant portions of my assessment and plan of care. HISTORY OF PRESENT ILLNESS Shankar Adler is a 21 y.o. female. HPI NEW patient referral for consultation by JACE Bryant NP for a new RIGHT breast cancer. She has a palpable RIGHT breast lump. She denies any nipple inversion or discharge. The lump led to a biopsy that revealed breast cancer. OB History [facility] Dimmer 1 Para Term  AB Living SAB  
   
 TAB Ectopic Molar Multiple Live Births Obstetric Comments Menarche 8, LMP 3/22/2019, # of children 1, age of 1st delivery 21, Hysterectomy/oophorectomy no/no, Breast bx yes, right 3/2019, history of breast feeding yes, BCP yes, Hormone therapy na No family history of breast or ovarian cancer. Ultrasound - 3/26/19 1. BI-RADS Assessment Category 4: Suspicious abnormality - Biopsy should be 
performed in the absence of clinical contraindication. 2.4 cm solid right breast 
mass, representing a suspicious, solid mass. Ultrasound-guided core needle 
biopsy is recommended. 
  
The patient has been notified of these results and recommendations. We have 
informed the office of the referring physician. At the patient's request, we 
will proceed with biopsy. Past Medical History:  
Diagnosis Date  Anxiety  Depression  PTSD (post-traumatic stress disorder)  Suicidal intent Past Surgical History:  
Procedure Laterality Date  HX CHOLECYSTECTOMY   Social History Socioeconomic History  Marital status: SINGLE Spouse name: Not on file  Number of children: Not on file  Years of education: Not on file  Highest education level: Not on file Occupational History  Not on file Social Needs  Financial resource strain: Not on file  Food insecurity:  
  Worry: Not on file Inability: Not on file  Transportation needs:  
  Medical: Not on file Non-medical: Not on file Tobacco Use  Smoking status: Current Some Day Smoker Types: Cigarettes  Smokeless tobacco: Never Used  Tobacco comment: black and mild every few days Substance and Sexual Activity  Alcohol use: Yes Comment: 2-3 times  weekly  Drug use: No  
 Sexual activity: Yes  
  Partners: Male Lifestyle  Physical activity:  
  Days per week: Not on file Minutes per session: Not on file  Stress: Not on file Relationships  Social connections:  
  Talks on phone: Not on file Gets together: Not on file Attends Islam service: Not on file Active member of club or organization: Not on file Attends meetings of clubs or organizations: Not on file Relationship status: Not on file  Intimate partner violence:  
  Fear of current or ex partner: Not on file Emotionally abused: Not on file Physically abused: Not on file Forced sexual activity: Not on file Other Topics Concern  Not on file Social History Narrative 17: unemployed, 18 mo daughter OB History Amee Neftali 1 Para Term  AB Living SAB  
   
 TAB Ectopic Molar Multiple Live Births Obstetric Comments Menarche 8, LMP 3/22/2019, # of children 1, age of 1st delivery 21, Hysterectomy/oophorectomy no/no, Breast bx yes, right 3/2019, history of breast feeding yes, BCP yes, Hormone therapy na No current outpatient medications on file. No Known Allergies Review of Systems Constitutional: Negative. HENT: Negative. Eyes: Negative. Respiratory: Negative. Cardiovascular: Negative. Gastrointestinal: Negative. Genitourinary: Negative. Musculoskeletal: Negative. Skin: Negative. Neurological: Negative. Endo/Heme/Allergies: Negative. Psychiatric/Behavioral: Negative. All other systems reviewed and are negative. ESTROGEN AND PROGESTERONE RECEPTOR ASSAY Invasive Component Estrogen receptor 35% Positive with weak intensity. Progesterone receptor 0% Negative. Interpretation HER-2/luz Immunohistochemistry (IHC) Results: 2+, Score = equivocal  
FINAL PATHOLOGIC DIAGNOSIS Breast, right, biopsy:  
Invasive poorly differentiated carcinoma. See comment. Comment ADDENDUM PENDING for hormone receptor status and HER-2 protein overexpression by immunohistochemistry. The lesion is minimally 1.2 cm in greatest dimension. HER 2 FISH - negative. Physical Exam  
Constitutional: She is oriented to person, place, and time. She appears well-developed and well-nourished. HENT:  
Head: Normocephalic. Eyes: EOM are normal.  
Neck: Neck supple. No thyromegaly present. Cardiovascular: Intact distal pulses. Pulmonary/Chest: Effort normal and breath sounds normal. Right breast exhibits mass (mass 10:00 ). Right breast exhibits no inverted nipple, no nipple discharge, no skin change and no tenderness. Left breast exhibits no inverted nipple, no mass, no nipple discharge, no skin change and no tenderness. Breasts are symmetrical.  
Abdominal: Soft. Bowel sounds are normal.  
Musculoskeletal: Normal range of motion. Lymphadenopathy:  
  She has no cervical adenopathy. She has no axillary adenopathy. Neurological: She is alert and oriented to person, place, and time. Skin: Skin is warm. Nursing note and vitals reviewed. ASSESSMENT and PLAN 
  ICD-10-CM ICD-9-CM 1. Malignant neoplasm of upper-outer quadrant of right female breast, unspecified estrogen receptor status ([facility] [address] C50.411 174.4 MRI BREAST BI W WO CONT BRAC-ANALYSIS  
 
20 yo female with right breast  
T2 N0 IDC er wp pr neg her 2 luz negative Poorly differentiated carcinoma I have reviewed the imaging and pathology with her and she was given copies of these reports. 90 minutes were spent face-to-face with the patient during this encounter and  90% of that time was spent on counseling and coordination of care. 1. Discussed lumpectomy and radiation vs mastectomy. Discussed reconstruction. MRI ordered to see if patient is a candidate for a lumpectomy. 2. Discussed sentinel lymph node biopsy. 3. Discussed external beam radiation. 4. Discussed hormone therapy. 5. Discussed the possibility of chemotherapy. 6. Discussed genetic testing Will send gene panel and mammaprint Will schedule mri and appointment with med onc. I will also have her see plastics. If no gene mutation could technically have lumpectomy. I will need to see her back after test results come in. If you have questions, please do not hesitate to call me. I look forward to following Ms. Milanny Leanna along with you. Sincerely, Timmie Aase, MD

## 2019-04-05 NOTE — H&P (VIEW-ONLY)
HISTORY OF PRESENT ILLNESS Maria Luisa Reynoso is a 21 y.o. female. HPI NEW patient referral for consultation by JACE Hernandez NP for a new RIGHT breast cancer. She has a palpable RIGHT breast lump. She denies any nipple inversion or discharge. The lump led to a biopsy that revealed breast cancer. OB History Naoma Brisk 1 Para Term  AB Living SAB  
   
 TAB Ectopic Molar Multiple Live Births Obstetric Comments Menarche 8, LMP 3/22/2019, # of children 1, age of 1st delivery 21, Hysterectomy/oophorectomy no/no, Breast bx yes, right 3/2019, history of breast feeding yes, BCP yes, Hormone therapy na No family history of breast or ovarian cancer. Ultrasound - 3/26/19 1. BI-RADS Assessment Category 4: Suspicious abnormality - Biopsy should be 
performed in the absence of clinical contraindication. 2.4 cm solid right breast 
mass, representing a suspicious, solid mass. Ultrasound-guided core needle 
biopsy is recommended. 
  
The patient has been notified of these results and recommendations. We have 
informed the office of the referring physician. At the patient's request, we 
will proceed with biopsy. Past Medical History:  
Diagnosis Date  Anxiety  Depression  PTSD (post-traumatic stress disorder)  Suicidal intent Past Surgical History:  
Procedure Laterality Date  HX CHOLECYSTECTOMY   Social History Socioeconomic History  Marital status: SINGLE Spouse name: Not on file  Number of children: Not on file  Years of education: Not on file  Highest education level: Not on file Occupational History  Not on file Social Needs  Financial resource strain: Not on file  Food insecurity:  
  Worry: Not on file Inability: Not on file  Transportation needs:  
  Medical: Not on file Non-medical: Not on file Tobacco Use  
  Smoking status: Current Some Day Smoker Types: Cigarettes  Smokeless tobacco: Never Used  Tobacco comment: black and mild every few days Substance and Sexual Activity  Alcohol use: Yes Comment: 2-3 times  weekly  Drug use: No  
 Sexual activity: Yes  
  Partners: Male Lifestyle  Physical activity:  
  Days per week: Not on file Minutes per session: Not on file  Stress: Not on file Relationships  Social connections:  
  Talks on phone: Not on file Gets together: Not on file Attends Rastafarian service: Not on file Active member of club or organization: Not on file Attends meetings of clubs or organizations: Not on file Relationship status: Not on file  Intimate partner violence:  
  Fear of current or ex partner: Not on file Emotionally abused: Not on file Physically abused: Not on file Forced sexual activity: Not on file Other Topics Concern  Not on file Social History Narrative 17: unemployed, 18 mo daughter OB History Daniel Luis 1 Para Term  AB Living SAB  
   
 TAB Ectopic Molar Multiple Live Births Obstetric Comments Menarche 8, LMP 3/22/2019, # of children 1, age of 1st delivery 21, Hysterectomy/oophorectomy no/no, Breast bx yes, right 3/2019, history of breast feeding yes, BCP yes, Hormone therapy na No current outpatient medications on file. No Known Allergies Review of Systems Constitutional: Negative. HENT: Negative. Eyes: Negative. Respiratory: Negative. Cardiovascular: Negative. Gastrointestinal: Negative. Genitourinary: Negative. Musculoskeletal: Negative. Skin: Negative. Neurological: Negative. Endo/Heme/Allergies: Negative. Psychiatric/Behavioral: Negative. All other systems reviewed and are negative. ESTROGEN AND PROGESTERONE RECEPTOR ASSAY Invasive Component Estrogen receptor 35% Positive with weak intensity. Progesterone receptor 0% Negative. Interpretation HER-2/luz Immunohistochemistry (IHC) Results: 2+, Score = equivocal  
FINAL PATHOLOGIC DIAGNOSIS Breast, right, biopsy:  
Invasive poorly differentiated carcinoma. See comment. Comment ADDENDUM PENDING for hormone receptor status and HER-2 protein overexpression by immunohistochemistry. The lesion is minimally 1.2 cm in greatest dimension. HER 2 FISH - negative. Physical Exam  
Constitutional: She is oriented to person, place, and time. She appears well-developed and well-nourished. HENT:  
Head: Normocephalic. Eyes: EOM are normal.  
Neck: Neck supple. No thyromegaly present. Cardiovascular: Intact distal pulses. Pulmonary/Chest: Effort normal and breath sounds normal. Right breast exhibits mass (mass 10:00 ). Right breast exhibits no inverted nipple, no nipple discharge, no skin change and no tenderness. Left breast exhibits no inverted nipple, no mass, no nipple discharge, no skin change and no tenderness. Breasts are symmetrical.  
Abdominal: Soft. Bowel sounds are normal.  
Musculoskeletal: Normal range of motion. Lymphadenopathy:  
  She has no cervical adenopathy. She has no axillary adenopathy. Neurological: She is alert and oriented to person, place, and time. Skin: Skin is warm. Nursing note and vitals reviewed. ASSESSMENT and PLAN 
  ICD-10-CM ICD-9-CM 1. Malignant neoplasm of upper-outer quadrant of right female breast, unspecified estrogen receptor status ([facility] [address] C50.411 174.4 MRI BREAST BI W WO CONT  
   BRAC-ANALYSIS  
 
22 yo female with right breast  
T2 N0 IDC er wp pr neg her 2 luz negative Poorly differentiated carcinoma I have reviewed the imaging and pathology with her and she was given copies of these reports.  
 
90 minutes were spent face-to-face with the patient during this encounter and  90% of that time was spent on counseling and coordination of care. 1. Discussed lumpectomy and radiation vs mastectomy. Discussed reconstruction. MRI ordered to see if patient is a candidate for a lumpectomy. 2. Discussed sentinel lymph node biopsy. 3. Discussed external beam radiation. 4. Discussed hormone therapy. 5. Discussed the possibility of chemotherapy. 6. Discussed genetic testing Will send gene panel and mammaprint Will schedule mri and appointment with med onc. I will also have her see plastics. If no gene mutation could technically have lumpectomy. I will need to see her back after test results come in.

## 2019-04-05 NOTE — PROGRESS NOTES
The patient's genetic testing was obtained and sent to [facility] via [facility].

## 2019-04-08 DIAGNOSIS — C50.411 MALIGNANT NEOPLASM OF UPPER-OUTER QUADRANT OF RIGHT FEMALE BREAST, UNSPECIFIED ESTROGEN RECEPTOR STATUS (HCC): Primary | ICD-10-CM

## 2019-04-08 NOTE — COMMUNICATION BODY
HISTORY OF PRESENT ILLNESS  Lela Rao is a 21 y.o. female. HPI NEW patient referral for consultation by A. Elsa Gosselin NP for a new RIGHT breast cancer. She has a palpable RIGHT breast lump. She denies any nipple inversion or discharge. The lump led to a biopsy that revealed breast cancer. OB History        1    Para        Term                AB        Living           SAB        TAB        Ectopic        Molar        Multiple        Live Births              Obstetric Comments   Menarche 8, LMP 3/22/2019, # of children 1, age of 4st delivery 21, Hysterectomy/oophorectomy no/no, Breast bx yes, right 3/2019, history of breast feeding yes, BCP yes, Hormone therapy na           No family history of breast or ovarian cancer. Ultrasound - 3/26/19  1. BI-RADS Assessment Category 4: Suspicious abnormality - Biopsy should be  performed in the absence of clinical contraindication. 2.4 cm solid right breast  mass, representing a suspicious, solid mass. Ultrasound-guided core needle  biopsy is recommended.     The patient has been notified of these results and recommendations. We have  informed the office of the referring physician. At the patient's request, we  will proceed with biopsy.     Past Medical History:   Diagnosis Date    Anxiety     Depression     PTSD (post-traumatic stress disorder)     Suicidal intent      Past Surgical History:   Procedure Laterality Date    HX CHOLECYSTECTOMY       Social History     Socioeconomic History    Marital status: SINGLE     Spouse name: Not on file    Number of children: Not on file    Years of education: Not on file    Highest education level: Not on file   Occupational History    Not on file   Social Needs    Financial resource strain: Not on file    Food insecurity:     Worry: Not on file     Inability: Not on file    Transportation needs:     Medical: Not on file     Non-medical: Not on file   Tobacco Use    Smoking status: Current Some Day Smoker     Types: Cigarettes    Smokeless tobacco: Never Used    Tobacco comment: black and mild every few days   Substance and Sexual Activity    Alcohol use: Yes     Comment: 2-3 times  weekly    Drug use: No    Sexual activity: Yes     Partners: Male   Lifestyle    Physical activity:     Days per week: Not on file     Minutes per session: Not on file    Stress: Not on file   Relationships    Social connections:     Talks on phone: Not on file     Gets together: Not on file     Attends Jew service: Not on file     Active member of club or organization: Not on file     Attends meetings of clubs or organizations: Not on file     Relationship status: Not on file    Intimate partner violence:     Fear of current or ex partner: Not on file     Emotionally abused: Not on file     Physically abused: Not on file     Forced sexual activity: Not on file   Other Topics Concern    Not on file   Social History Narrative    17: unemployed, 25 mo daughter     OB History        1    Para        Term                AB        Living           SAB        TAB        Ectopic        Molar        Multiple        Live Births              Obstetric Comments   Menarche 8, LMP 3/22/2019, # of children 1, age of 4st delivery 21, Hysterectomy/oophorectomy no/no, Breast bx yes, right 3/2019, history of breast feeding yes, BCP yes, Hormone therapy na           No current outpatient medications on file. No Known Allergies    Review of Systems   Constitutional: Negative. HENT: Negative. Eyes: Negative. Respiratory: Negative. Cardiovascular: Negative. Gastrointestinal: Negative. Genitourinary: Negative. Musculoskeletal: Negative. Skin: Negative. Neurological: Negative. Endo/Heme/Allergies: Negative. Psychiatric/Behavioral: Negative. All other systems reviewed and are negative.        ESTROGEN AND PROGESTERONE RECEPTOR ASSAY   Invasive Component   Estrogen receptor 35% Positive with weak intensity. Progesterone receptor 0% Negative. Interpretation   HER-2/luz Immunohistochemistry (IHC)   Results: 2+, Score = equivocal   FINAL PATHOLOGIC DIAGNOSIS   Breast, right, biopsy:   Invasive poorly differentiated carcinoma. See comment. Comment   ADDENDUM PENDING for hormone receptor status and HER-2 protein overexpression by immunohistochemistry. The lesion is minimally 1.2 cm in greatest dimension. HER 2 FISH - negative. Physical Exam   Constitutional: She is oriented to person, place, and time. She appears well-developed and well-nourished. HENT:   Head: Normocephalic. Eyes: EOM are normal.   Neck: Neck supple. No thyromegaly present. Cardiovascular: Intact distal pulses. Pulmonary/Chest: Effort normal and breath sounds normal. Right breast exhibits mass (mass 10:00 ). Right breast exhibits no inverted nipple, no nipple discharge, no skin change and no tenderness. Left breast exhibits no inverted nipple, no mass, no nipple discharge, no skin change and no tenderness. Breasts are symmetrical.   Abdominal: Soft. Bowel sounds are normal.   Musculoskeletal: Normal range of motion. Lymphadenopathy:     She has no cervical adenopathy. She has no axillary adenopathy. Neurological: She is alert and oriented to person, place, and time. Skin: Skin is warm. Nursing note and vitals reviewed. ASSESSMENT and PLAN    ICD-10-CM ICD-9-CM    1. Malignant neoplasm of upper-outer quadrant of right female breast, unspecified estrogen receptor status ([facility] [address] C50.411 174.4 MRI BREAST BI W WO CONT      BRAC-ANALYSIS     20 yo female with right breast   T2 N0 IDC er wp pr neg her 2 luz negative  Poorly differentiated carcinoma  I have reviewed the imaging and pathology with her and she was given copies of these reports. 90 minutes were spent face-to-face with the patient during this encounter and  90% of that time was spent on counseling and coordination of care.     1. Discussed lumpectomy and radiation vs mastectomy. Discussed reconstruction. MRI ordered to see if patient is a candidate for a lumpectomy. 2. Discussed sentinel lymph node biopsy. 3. Discussed external beam radiation. 4. Discussed hormone therapy. 5. Discussed the possibility of chemotherapy. 6. Discussed genetic testing    Will send gene panel and mammaprint  Will schedule mri and appointment with med onc. I will also have her see plastics. If no gene mutation could technically have lumpectomy. I will need to see her back after test results come in.

## 2019-04-09 ENCOUNTER — OFFICE VISIT (OUTPATIENT)
Dept: ONCOLOGY | Age: 24
End: 2019-04-09

## 2019-04-09 ENCOUNTER — DOCUMENTATION ONLY (OUTPATIENT)
Dept: ONCOLOGY | Age: 24
End: 2019-04-09

## 2019-04-09 VITALS
TEMPERATURE: 98.2 F | DIASTOLIC BLOOD PRESSURE: 72 MMHG | BODY MASS INDEX: 35.03 KG/M2 | SYSTOLIC BLOOD PRESSURE: 105 MMHG | HEIGHT: 66 IN | RESPIRATION RATE: 16 BRPM | HEART RATE: 96 BPM | WEIGHT: 218 LBS | OXYGEN SATURATION: 96 %

## 2019-04-09 DIAGNOSIS — Z17.0 MALIGNANT NEOPLASM OF UPPER-OUTER QUADRANT OF RIGHT BREAST IN FEMALE, ESTROGEN RECEPTOR POSITIVE (HCC): Primary | ICD-10-CM

## 2019-04-09 DIAGNOSIS — C50.411 MALIGNANT NEOPLASM OF UPPER-OUTER QUADRANT OF RIGHT BREAST IN FEMALE, ESTROGEN RECEPTOR POSITIVE (HCC): Primary | ICD-10-CM

## 2019-04-09 DIAGNOSIS — C50.411 MALIGNANT NEOPLASM OF UPPER-OUTER QUADRANT OF RIGHT FEMALE BREAST, UNSPECIFIED ESTROGEN RECEPTOR STATUS (HCC): Primary | ICD-10-CM

## 2019-04-09 RX ORDER — OSELTAMIVIR PHOSPHATE 75 MG/1
CAPSULE ORAL
Refills: 0 | COMMUNITY
Start: 2019-04-06 | End: 2019-05-15 | Stop reason: ALTCHOICE

## 2019-04-09 NOTE — PROGRESS NOTES
Initial chemo teaching completed on AC/T with Mother and Pt, written material reviewed, and copies given, consent signed, chemo packet given. All questions answered.

## 2019-04-09 NOTE — PROGRESS NOTES
HISTORY OF PRESENT ILLNESS  Muriel Soliz is a 21 y.o. female. [employer]  Social Work Navigator Encounter     Patient Name:  Muriel Soliz    Medical History: dx breast cancer    Advance Directives:    Narrative: SW began note but had system issues. PT has a 1year old dtr and her mother is present with her. Pt preferred not to have a full assessment today and stated she is coping ok. SW will meet with pt at the next visit and see if she has any psycho social needs. Pt had an appt with [facility] in December but missed the appt. EYAL and RN met with pt and provided support also.      Barriers to Care:     Plan:             ROS    Physical Exam    ASSESSMENT and PLAN

## 2019-04-09 NOTE — PROGRESS NOTES
Oncology Navigator  Psychosocial Assessment    Reason for Assessment:    []Depression  []Anxiety  []Caregiver [facility]  []Maladaptive Coping with Serious Illness   [x]Other:    Sources of Information:    []Patient  []Family  []Staff  []Medical Record    Advance Care Planning:  No flowsheet data found.     Mental Status:    []Alert  []Lethargic  []Unresponsive  Oriented to:  []Person  []Place  []Time  []Situation      Barriers to Learning:    []Language  []Developmental  []Cognitive  []Altered Mental Status  []Visual/Hearing Impairment  []Unable to Read/Write  []Motivational   []No Barriers Identified  []Other:    Relationship Status:  []Single  []  []Significant Other/Life Partner  []  []  []      Living Circumstances:  []Lives Alone  []Family/Significant Other in Household  []Roommates  []Children in the Home  []Paid Caregivers  []Assisted Living Facility/Group Home  []Skilled [address]  []Homeless  []Incarcerated  []Environmental/Care Concerns  []Other:    Support System:    []Strong  []Fair  []Limited    Financial/Legal Concerns:    []Uninsured  []Limited Income/Resources  []Non-Citizen  []No Concerns Identified  []Financial POA:    []Other:    Christianity/Spiritual/Existential:  []Strong Sense of Spirituality  []Involved in [employer]  []Request  Visit  []Expressing [place]  []No Concerns Identified    Coping with Illness:         Patient: Family/Caregiver:   Understanding and Acceptance of Illness/Prognosis  [] []   Strong Sense of Resilience [] []   Self Reflection [] []   Engaged Support System [] []   Does not Readily Discuss Illness [] []   Denial of Terminal Status [] []   Anger [] []   Depression [] []   Anxiety/Fear [] []   Bargaining [] []   Recent Diagnosis/Prognosis [] []   Difficulties with Body Image [] []   Loss of Identity [] []   Excessive Substance Use [] []   Mental Health History [] []   Enmeshed Relationships [] []   History of Loss [] []   Anticipatory Grief [] []   Concern for Complicated Grief [] []   Suicidal Ideation or Plan [] []   Unable to assess [] []                  Narrative:         Referrals:     I.   Transportation    Medicaid ([employer]) []   ACS Road to Recovery []                                    Regional organization  []                                      Financial Assistance/Medication Access    Patient assistance program (Care Card) []   Co-pay assistance  []                                    Leukemia & Lymphoma Society []   [address]  []   [facility] [address] []   Can[facility]Care  []     Emotional support    Peer support group []   Local counseling []                                    Online support group []   Coordination of psychiatry consult []     Goals/Plan:

## 2019-04-09 NOTE — PROGRESS NOTES
Hugo You is a 21 y.o. female new patient referred by Dr. Remi Brandon to provider for right breast cancer f/u. Patient ER +ve, IL -ve, HER2 +ve. Patient's MRI scheduled for 4/11/19. Patient accompanied by her mother to today's appt. Patient has only 1 younger child. Patient denies pain. +ve smoker    Visit Vitals  /72 (BP 1 Location: Left arm, BP Patient Position: Sitting)   Pulse 96   Temp 98.2 °F (36.8 °C) (Oral)   Resp 16   Ht 5' 6\" (1.676 m)   Wt 218 lb (98.9 kg)   LMP 03/22/2019 (Exact Date)   SpO2 96%   BMI 35.19 kg/m²       Pain Scale: 0 - No pain/10  Pain Location:     Health Maintenance Review: Patient reminded of \"due or due soon\" health maintenance. I have asked the patient to contact his/her primary care provider (PCP) for follow-up on his/her health maintenance.

## 2019-04-10 DIAGNOSIS — C50.411 MALIGNANT NEOPLASM OF UPPER-OUTER QUADRANT OF RIGHT FEMALE BREAST, UNSPECIFIED ESTROGEN RECEPTOR STATUS (HCC): Primary | ICD-10-CM

## 2019-04-10 NOTE — PROGRESS NOTES
Oncology Consultation Note        Patient: Alivia Palma MRN: [id number]  SSN: [id number]    YOB: 1995  Age: 21 y.o. Sex: female      Subjective:      Alivai Palma is a 21 y.o. female who I am seeing in consultation for a new diagnosis of right sided breast carcinoma. She felt a lump in her right breast which grew over a period of 2 months. She underwent an USG and biopsy of the mass which reveals poorly differentiated carcinoma with ER 35% and NV 0 and Her 2 -ve. She saw Dr. Kathrine Zamora for surgical options. A genomic testing and panel for inherited genetic mutations has been sent. She comes in to discuss the next steps. She is accompanied by her mother. Review of Systems:    Constitutional: negative  Eyes: negative  Ears, Nose, Mouth, Throat, and Face: negative  Respiratory: negative  Cardiovascular: negative  Gastrointestinal: negative  Genitourinary:negative  Integument/Breast: right sided breast lump  Hematologic/Lymphatic: negative  Musculoskeletal:negative  Neurological: negative        Past Medical History:   Diagnosis Date    Anxiety     Depression     PTSD (post-traumatic stress disorder)     Suicidal intent      Past Surgical History:   Procedure Laterality Date    HX CHOLECYSTECTOMY  2013      Family History   Problem Relation Age of Onset    Hypertension Mother     Psychiatric Disorder Mother     Psychiatric Disorder Sister      Social History     Tobacco Use    Smoking status: Current Some Day Smoker     Types: Cigarettes    Smokeless tobacco: Never Used    Tobacco comment: black and mild every few days   Substance Use Topics    Alcohol use: Yes     Comment: 2-3 times  weekly      Prior to Admission medications    Medication Sig Start Date End Date Taking?  Authorizing Provider   oseltamivir (TAMIFLU) 75 mg capsule take 1 capsule by mouth twice a day for 5 days 4/6/19  Yes Provider, Historical              No Known Allergies        Objective:     Vitals:    04/09/19 1051   BP: 105/72   Pulse: 96   Resp: 16   Temp: 98.2 °F (36.8 °C)   TempSrc: Oral   SpO2: 96%   Weight: 218 lb (98.9 kg)   Height: 5' 6\" (1.676 m)            Physical Exam:    GENERAL: alert, cooperative, no distress, appears stated age  EYE: conjunctivae/corneas clear. PERRL, EOM's intact. Fundi benign  LYMPHATIC: Cervical, supraclavicular, and axillary nodes normal.   THROAT & NECK: normal and no erythema or exudates noted. LUNG: clear to auscultation bilaterally  HEART: regular rate and rhythm, S1, S2 normal, no murmur, click, rub or gallop  ABDOMEN: soft, non-tender. Bowel sounds normal. No masses,  no organomegaly  EXTREMITIES:  extremities normal, atraumatic, no cyanosis or edema  SKIN: Normal.  NEUROLOGIC: AOx3. Gait normal. Reflexes and motor strength normal and symmetric. Cranial nerves 2-12 and sensation grossly intact. Assessment:     1. Right breast carcinoma:  T2 N0 M0 (Stage IIA) poorly differentiated carcinoma, Tumor size 2.4 cm, LN -ve, grade 3, ER 35%, AL 0%, Her 2 -ve        ECOG PS 0  Intent of Treatment - curative  Prognosis - good    I spent 65 minute with the patient in a face-to-face encounter. I explained her the stage of the disease, pathophysiology of the disease and the treatment approaches. I answered all her questions. More than 50% of the time was utilized in education, counseling and co-ordination of care. Patient sent for consideration of neoadjuvant therapy. I spent significant time in explaining the rationale of neoadjuvant therapy is   tumor shrinkage with the hope to achieve a successful surgical treatment (with clear surgical margin) which may be a lumpectomy/mastectomy associated with ymph node dissection. I explained to patient that neoadjuvant chemotherapy has not shown to be superior to adjuvant chemotherapy in the treatment of breast cancer.   However if the patient achieves a pathological complete remission (pCR), her chances for 5 year survival is excellent. Since the tumor is high grade, I believe she should have a good response to systemic therapy. NSABP B-27 randomized patient to a combination of AC=>T and AC. There was no difference in the DFS and OS between the two groups. However greater proportion of patient were able to achieve a pCR in the arm receiving AC=>T. We also know that patients who do achieve a pCR have a better outcome than those who don't. Thus I believe AC=>T is the treatment of choice. I discussed various treatment options for neoadjuvant chemotherapy including anthracycline and non-anthracycline containing regimens. I recommended dose dense AC followed by weekly Paclitaxel. Adriamycin 60 mg/m2 q2w X 4  Cyclophosphamide 600 mg/m2 q2w X 4    Followed by    Paclitaxel 80 mg/m2 qw X 12    The duration of this treatment plan will be until progression, intolerable side effects, or patient choice. Patient will be meeting with navigation services to discuss any financial barriers to care/estimated cost of care. We will plan to see the patient in follow up at least once per cycle, or sooner if symptoms warrant. I counseled the patient regarding the chemotherapy. After weighing the benefit and risks, she agreed to proceed with chemotherapy. The patient's emotional well being was addressed during this office visit and patient seems to be coping well with the diagnosis and the treatment. I discussed with the patient the risk of infertility with the systemic chemotherapy. There is approximately 20% risk of infertility with the systemic chemotherapy. I educated her about the technique and role of ovarian preservation and gave her the option to see a fertility specialist to discuss this topic. She is not interested in pursing fertility preservation. I will likely use LH-RH agonist to preserve fartility.           Common Side Effects of Chemotherapy  Decreased Blood Counts Your blood counts can decrease temporarily due to chemotherapy, they will recover over time. This is an expected side effect that your Doctor will be monitoring.  - If you experience fevers (temperature >100.4°F), bleeding or unexplained bruising, please call the office right away   Risk of Infection Your white blood cells can decrease temporarily due to chemotherapy and can put you at higher risk of infection. Washing hands frequently with soap and avoiding sick contacts can reduce your risk of infection.  - If you experience fevers (temperature >100.4°F), shaking chills, or any signs of infection, please call the office immediately   Anemia Chemotherapy can cause your red blood cells to temporarily decrease; this is an expected side effect that your Doctor will be monitoring.  - You may experience fatigue if this occurs, please notify the office if you experience bleeding, shortness of breath with minimal exertion or at rest, rapid heartbeat, or feeling as though you may lose consciousness. Hair Loss Chemotherapy can affect your hair follicles and cause you to lose hair. This can occur on your scalp hair but also all over your body including eyebrows and eye lashes   Nausea  You have been prescribed nausea medication to take if needed. Please follow the directions given to you by your Doctor. - Please call the office if the medications you have been given are not relieving nausea. Vomiting Make sure you are taking anti-nausea medication as prescribed. Eating small amounts of bland foods frequently can help. - Please call the office right away if you are vomiting more than 4 times per day or are unable to keep down food or fluids   Diarrhea Eating small amounts of bland foods frequently can help, increase your fluid intake. It is usually ok to take Imodium for diarrhea. - Please call the office right away if you experience more than 4 episodes of watery diarrhea or if you are feeling dehydrated.      Female patients of childbearing age need to avoid pregnancy during chemotherapy. You can reach Medical Oncology at [phone] [address] [facility] with further questions or concerns at: [phone].  - Calls during normal business hours will reach our office.  - Calls after hours or on the weekend will reach an answering service and the on-call Oncologist will return your call. Plan:       1. Port-a-cath placement - Dr. Irish Lorenzo  2. Fax a prescription of Compazine and Dexamethasone (pre-med) to pharmacy. 3. 2-D Echo  4. Labs  5. Start Dose Dense AC chemotherapy tentatively   6. Return at the time of starting systemic chemotherapy        Signed By: Kerline Weems MD     April 9, 2019           CC. Nikky Wagner MD  CC.  Belen Alcala NP

## 2019-04-12 PROBLEM — Z31.62 ENCOUNTER FOR FERTILITY PRESERVATION COUNSELING PRIOR TO CANCER THERAPY: Status: ACTIVE | Noted: 2019-04-12

## 2019-04-15 ENCOUNTER — ANESTHESIA EVENT (OUTPATIENT)
Dept: MEDSURG UNIT | Age: 24
End: 2019-04-15
Payer: COMMERCIAL

## 2019-04-15 ENCOUNTER — HOSPITAL ENCOUNTER (OUTPATIENT)
Dept: NON INVASIVE DIAGNOSTICS | Age: 24
Discharge: HOME | End: 2019-04-15
Attending: INTERNAL MEDICINE
Payer: COMMERCIAL

## 2019-04-15 ENCOUNTER — TELEPHONE (OUTPATIENT)
Dept: SURGERY | Age: 24
End: 2019-04-15

## 2019-04-15 ENCOUNTER — HOSPITAL ENCOUNTER (OUTPATIENT)
Dept: MRI IMAGING | Age: 24
Discharge: HOME | End: 2019-04-15
Attending: SURGERY
Payer: COMMERCIAL

## 2019-04-15 DIAGNOSIS — C50.411 MALIGNANT NEOPLASM OF UPPER-OUTER QUADRANT OF RIGHT FEMALE BREAST, UNSPECIFIED ESTROGEN RECEPTOR STATUS (HCC): ICD-10-CM

## 2019-04-15 DIAGNOSIS — C50.411 MALIGNANT NEOPLASM OF UPPER-OUTER QUADRANT OF RIGHT BREAST IN FEMALE, ESTROGEN RECEPTOR POSITIVE (HCC): ICD-10-CM

## 2019-04-15 DIAGNOSIS — Z17.0 MALIGNANT NEOPLASM OF UPPER-OUTER QUADRANT OF RIGHT BREAST IN FEMALE, ESTROGEN RECEPTOR POSITIVE (HCC): ICD-10-CM

## 2019-04-15 LAB
ECHO AV CUSP MM: 1.99 CM
ECHO AV PEAK GRADIENT: 4.3 MMHG
ECHO AV PEAK VELOCITY: 103.58 CM/S
ECHO EST RA PRESSURE: 10 MMHG
ECHO LA AREA 4C: 12.2 CM2
ECHO LA VOL 4C: 32.06 ML (ref 22–52)
ECHO LV E' LATERAL VELOCITY: 12.77 CM/S
ECHO LV E' SEPTAL VELOCITY: 12.64 CM/S
ECHO LV INTERNAL DIMENSION DIASTOLIC MMODE: 4.57 CM
ECHO LV INTERNAL DIMENSION SYSTOLIC MMODE: 2.77 CM
ECHO LV IVSD MMODE: 1.03 CM
ECHO LV POSTERIOR WALL DIASTOLIC MMODE: 1.01 CM
ECHO LVOT PEAK GRADIENT: 2.4 MMHG
ECHO LVOT PEAK VELOCITY: 77.78 CM/S
ECHO MV A VELOCITY: 44.57 CM/S
ECHO MV E DECELERATION TIME (DT): 203.6 MS
ECHO MV E VELOCITY: 111.33 CM/S
ECHO MV E/A RATIO: 2.5
ECHO MV E/E' LATERAL: 8.72
ECHO MV E/E' RATIO (AVERAGED): 8.76
ECHO MV E/E' SEPTAL: 8.81
ECHO MV REGURGITANT PEAK GRADIENT: 4.1 MMHG
ECHO MV REGURGITANT PEAK VELOCITY: 101.8 CM/S
ECHO PULMONARY ARTERY SYSTOLIC PRESSURE (PASP): 21.7 MMHG
ECHO PV MAX VELOCITY: 61.42 CM/S
ECHO PV PEAK GRADIENT: 1.5 MMHG
ECHO RIGHT VENTRICULAR SYSTOLIC PRESSURE (RVSP): 21.7 MMHG
ECHO RV INTERNAL DIMENSION: 2.79 CM
ECHO TV REGURGITANT MAX VELOCITY: 170.66 CM/S
ECHO TV REGURGITANT PEAK GRADIENT: 11.7 MMHG

## 2019-04-15 PROCEDURE — A9585 GADOBUTROL INJECTION: HCPCS | Performed by: SURGERY

## 2019-04-15 PROCEDURE — 77049 MRI BREAST C-+ W/CAD BI: CPT

## 2019-04-15 PROCEDURE — 74011250636 HC RX REV CODE- 250/636: Performed by: SURGERY

## 2019-04-15 PROCEDURE — 93306 TTE W/DOPPLER COMPLETE: CPT

## 2019-04-15 RX ADMIN — GADOBUTROL 10 ML: 604.72 INJECTION INTRAVENOUS at 09:40

## 2019-04-16 ENCOUNTER — APPOINTMENT (OUTPATIENT)
Dept: GENERAL RADIOLOGY | Age: 24
End: 2019-04-16
Attending: SURGERY
Payer: COMMERCIAL

## 2019-04-16 ENCOUNTER — HOSPITAL ENCOUNTER (OUTPATIENT)
Age: 24
Setting detail: OUTPATIENT SURGERY
Discharge: HOME | End: 2019-04-16
Attending: SURGERY | Admitting: SURGERY
Payer: COMMERCIAL

## 2019-04-16 ENCOUNTER — ANESTHESIA (OUTPATIENT)
Dept: MEDSURG UNIT | Age: 24
End: 2019-04-16
Payer: COMMERCIAL

## 2019-04-16 VITALS
RESPIRATION RATE: 16 BRPM | OXYGEN SATURATION: 100 % | BODY MASS INDEX: 34.06 KG/M2 | WEIGHT: 217 LBS | DIASTOLIC BLOOD PRESSURE: 88 MMHG | SYSTOLIC BLOOD PRESSURE: 116 MMHG | TEMPERATURE: 98.1 F | HEART RATE: 86 BPM | HEIGHT: 67 IN

## 2019-04-16 DIAGNOSIS — C50.411 MALIGNANT NEOPLASM OF UPPER-OUTER QUADRANT OF RIGHT FEMALE BREAST, UNSPECIFIED ESTROGEN RECEPTOR STATUS (HCC): ICD-10-CM

## 2019-04-16 LAB — HGB BLD-MCNC: 11 G/DL (ref 11.5–16)

## 2019-04-16 PROCEDURE — 74011250636 HC RX REV CODE- 250/636: Performed by: SURGERY

## 2019-04-16 PROCEDURE — 74011250636 HC RX REV CODE- 250/636

## 2019-04-16 PROCEDURE — 77030032490 HC SLV COMPR SCD KNE COVD -B: Performed by: SURGERY

## 2019-04-16 PROCEDURE — 85018 HEMOGLOBIN: CPT

## 2019-04-16 PROCEDURE — 74011000250 HC RX REV CODE- 250

## 2019-04-16 PROCEDURE — 77030002996 HC SUT SLK J&J -A: Performed by: SURGERY

## 2019-04-16 PROCEDURE — 74011250636 HC RX REV CODE- 250/636: Performed by: ANESTHESIOLOGY

## 2019-04-16 PROCEDURE — 77030020256 HC SOL INJ NACL 0.9%  500ML: Performed by: SURGERY

## 2019-04-16 PROCEDURE — 77030002933 HC SUT MCRYL J&J -A: Performed by: SURGERY

## 2019-04-16 PROCEDURE — 77030002986 HC SUT PROL J&J -A: Performed by: SURGERY

## 2019-04-16 PROCEDURE — 88305 TISSUE EXAM BY PATHOLOGIST: CPT

## 2019-04-16 PROCEDURE — C1788 PORT, INDWELLING, IMP: HCPCS | Performed by: SURGERY

## 2019-04-16 PROCEDURE — 88360 TUMOR IMMUNOHISTOCHEM/MANUAL: CPT

## 2019-04-16 PROCEDURE — 77030011267 HC ELECTRD BLD COVD -A: Performed by: SURGERY

## 2019-04-16 PROCEDURE — A4648 IMPLANTABLE TISSUE MARKER: HCPCS | Performed by: SURGERY

## 2019-04-16 PROCEDURE — 77030031139 HC SUT VCRL2 J&J -A: Performed by: SURGERY

## 2019-04-16 PROCEDURE — 77030011640 HC PAD GRND REM COVD -A: Performed by: SURGERY

## 2019-04-16 PROCEDURE — 77030020782 HC GWN BAIR PAWS FLX 3M -B

## 2019-04-16 PROCEDURE — 74011000250 HC RX REV CODE- 250: Performed by: SURGERY

## 2019-04-16 PROCEDURE — 76210000046 HC AMBSU PH II REC FIRST 0.5 HR: Performed by: SURGERY

## 2019-04-16 PROCEDURE — 71045 X-RAY EXAM CHEST 1 VIEW: CPT

## 2019-04-16 PROCEDURE — 77030039266 HC ADH SKN EXOFIN S2SG -A: Performed by: SURGERY

## 2019-04-16 PROCEDURE — 76210000035 HC AMBSU PH I REC 1 TO 1.5 HR: Performed by: SURGERY

## 2019-04-16 PROCEDURE — 76060000062 HC AMB SURG ANES 1 TO 1.5 HR: Performed by: SURGERY

## 2019-04-16 PROCEDURE — 77030010509 HC AIRWY LMA MSK TELE -A: Performed by: ANESTHESIOLOGY

## 2019-04-16 PROCEDURE — 76030000001 HC AMB SURG OR TIME 1 TO 1.5: Performed by: SURGERY

## 2019-04-16 DEVICE — SYSTEM INFUS PRT CATH 8FR L66CM INTRO 8FR CHST TI SGL LUMN: Type: IMPLANTABLE DEVICE | Site: CHEST | Status: FUNCTIONAL

## 2019-04-16 RX ORDER — MORPHINE SULFATE 2 MG/ML
2 INJECTION, SOLUTION INTRAMUSCULAR; INTRAVENOUS
Status: DISCONTINUED | OUTPATIENT
Start: 2019-04-16 | End: 2019-04-16 | Stop reason: HOSPADM

## 2019-04-16 RX ORDER — MIDAZOLAM HYDROCHLORIDE 1 MG/ML
1 INJECTION, SOLUTION INTRAMUSCULAR; INTRAVENOUS AS NEEDED
Status: DISCONTINUED | OUTPATIENT
Start: 2019-04-16 | End: 2019-04-16 | Stop reason: HOSPADM

## 2019-04-16 RX ORDER — SODIUM CHLORIDE 0.9 % (FLUSH) 0.9 %
5-40 SYRINGE (ML) INJECTION EVERY 8 HOURS
Status: DISCONTINUED | OUTPATIENT
Start: 2019-04-16 | End: 2019-04-16 | Stop reason: HOSPADM

## 2019-04-16 RX ORDER — OXYCODONE HYDROCHLORIDE 5 MG/1
5 TABLET ORAL AS NEEDED
Status: DISCONTINUED | OUTPATIENT
Start: 2019-04-16 | End: 2019-04-16 | Stop reason: HOSPADM

## 2019-04-16 RX ORDER — ONDANSETRON 2 MG/ML
4 INJECTION INTRAMUSCULAR; INTRAVENOUS AS NEEDED
Status: DISCONTINUED | OUTPATIENT
Start: 2019-04-16 | End: 2019-04-16 | Stop reason: HOSPADM

## 2019-04-16 RX ORDER — EPHEDRINE SULFATE 50 MG/ML
INJECTION, SOLUTION INTRAVENOUS AS NEEDED
Status: DISCONTINUED | OUTPATIENT
Start: 2019-04-16 | End: 2019-04-16 | Stop reason: HOSPADM

## 2019-04-16 RX ORDER — MIDAZOLAM HYDROCHLORIDE 1 MG/ML
1 INJECTION, SOLUTION INTRAMUSCULAR; INTRAVENOUS
Status: DISCONTINUED | OUTPATIENT
Start: 2019-04-16 | End: 2019-04-16 | Stop reason: HOSPADM

## 2019-04-16 RX ORDER — ACETAMINOPHEN 325 MG/1
650 TABLET ORAL ONCE
Status: DISCONTINUED | OUTPATIENT
Start: 2019-04-16 | End: 2019-04-16 | Stop reason: HOSPADM

## 2019-04-16 RX ORDER — LIDOCAINE HYDROCHLORIDE 10 MG/ML
0.5 INJECTION, SOLUTION EPIDURAL; INFILTRATION; INTRACAUDAL; PERINEURAL AS NEEDED
Status: DISCONTINUED | OUTPATIENT
Start: 2019-04-16 | End: 2019-04-16 | Stop reason: HOSPADM

## 2019-04-16 RX ORDER — DEXTROSE, SODIUM CHLORIDE, SODIUM LACTATE, POTASSIUM CHLORIDE, AND CALCIUM CHLORIDE 5; .6; .31; .03; .02 G/100ML; G/100ML; G/100ML; G/100ML; G/100ML
125 INJECTION, SOLUTION INTRAVENOUS CONTINUOUS
Status: DISCONTINUED | OUTPATIENT
Start: 2019-04-16 | End: 2019-04-16 | Stop reason: HOSPADM

## 2019-04-16 RX ORDER — SODIUM CHLORIDE 0.9 % (FLUSH) 0.9 %
5-40 SYRINGE (ML) INJECTION AS NEEDED
Status: DISCONTINUED | OUTPATIENT
Start: 2019-04-16 | End: 2019-04-16 | Stop reason: HOSPADM

## 2019-04-16 RX ORDER — CEFAZOLIN SODIUM 1 G/3ML
INJECTION, POWDER, FOR SOLUTION INTRAMUSCULAR; INTRAVENOUS AS NEEDED
Status: DISCONTINUED | OUTPATIENT
Start: 2019-04-16 | End: 2019-04-16 | Stop reason: HOSPADM

## 2019-04-16 RX ORDER — FENTANYL CITRATE 50 UG/ML
50 INJECTION, SOLUTION INTRAMUSCULAR; INTRAVENOUS AS NEEDED
Status: DISCONTINUED | OUTPATIENT
Start: 2019-04-16 | End: 2019-04-16 | Stop reason: HOSPADM

## 2019-04-16 RX ORDER — HEPARIN 100 UNIT/ML
SYRINGE INTRAVENOUS AS NEEDED
Status: DISCONTINUED | OUTPATIENT
Start: 2019-04-16 | End: 2019-04-16 | Stop reason: HOSPADM

## 2019-04-16 RX ORDER — PROPOFOL 10 MG/ML
INJECTION, EMULSION INTRAVENOUS
Status: DISCONTINUED | OUTPATIENT
Start: 2019-04-16 | End: 2019-04-16 | Stop reason: HOSPADM

## 2019-04-16 RX ORDER — MIDAZOLAM HYDROCHLORIDE 1 MG/ML
INJECTION, SOLUTION INTRAMUSCULAR; INTRAVENOUS AS NEEDED
Status: DISCONTINUED | OUTPATIENT
Start: 2019-04-16 | End: 2019-04-16 | Stop reason: HOSPADM

## 2019-04-16 RX ORDER — SODIUM CHLORIDE, SODIUM LACTATE, POTASSIUM CHLORIDE, CALCIUM CHLORIDE 600; 310; 30; 20 MG/100ML; MG/100ML; MG/100ML; MG/100ML
125 INJECTION, SOLUTION INTRAVENOUS CONTINUOUS
Status: DISCONTINUED | OUTPATIENT
Start: 2019-04-16 | End: 2019-04-16 | Stop reason: HOSPADM

## 2019-04-16 RX ORDER — ONDANSETRON 4 MG/1
4 TABLET, ORALLY DISINTEGRATING ORAL
Qty: 5 TAB | Refills: 1 | Status: SHIPPED | OUTPATIENT
Start: 2019-04-16 | End: 2019-04-24

## 2019-04-16 RX ORDER — DEXAMETHASONE SODIUM PHOSPHATE 4 MG/ML
INJECTION, SOLUTION INTRA-ARTICULAR; INTRALESIONAL; INTRAMUSCULAR; INTRAVENOUS; SOFT TISSUE AS NEEDED
Status: DISCONTINUED | OUTPATIENT
Start: 2019-04-16 | End: 2019-04-16 | Stop reason: HOSPADM

## 2019-04-16 RX ORDER — SODIUM CHLORIDE, SODIUM LACTATE, POTASSIUM CHLORIDE, CALCIUM CHLORIDE 600; 310; 30; 20 MG/100ML; MG/100ML; MG/100ML; MG/100ML
INJECTION, SOLUTION INTRAVENOUS
Status: DISCONTINUED | OUTPATIENT
Start: 2019-04-16 | End: 2019-04-16 | Stop reason: HOSPADM

## 2019-04-16 RX ORDER — OXYCODONE AND ACETAMINOPHEN 5; 325 MG/1; MG/1
1 TABLET ORAL
Qty: 30 TAB | Refills: 0 | Status: SHIPPED | OUTPATIENT
Start: 2019-04-16 | End: 2019-04-19

## 2019-04-16 RX ORDER — PROPOFOL 10 MG/ML
INJECTION, EMULSION INTRAVENOUS AS NEEDED
Status: DISCONTINUED | OUTPATIENT
Start: 2019-04-16 | End: 2019-04-16 | Stop reason: HOSPADM

## 2019-04-16 RX ORDER — ONDANSETRON 2 MG/ML
INJECTION INTRAMUSCULAR; INTRAVENOUS AS NEEDED
Status: DISCONTINUED | OUTPATIENT
Start: 2019-04-16 | End: 2019-04-16 | Stop reason: HOSPADM

## 2019-04-16 RX ORDER — DIPHENHYDRAMINE HYDROCHLORIDE 50 MG/ML
12.5 INJECTION, SOLUTION INTRAMUSCULAR; INTRAVENOUS AS NEEDED
Status: DISCONTINUED | OUTPATIENT
Start: 2019-04-16 | End: 2019-04-16 | Stop reason: HOSPADM

## 2019-04-16 RX ORDER — FENTANYL CITRATE 50 UG/ML
25 INJECTION, SOLUTION INTRAMUSCULAR; INTRAVENOUS
Status: DISCONTINUED | OUTPATIENT
Start: 2019-04-16 | End: 2019-04-16 | Stop reason: HOSPADM

## 2019-04-16 RX ORDER — FENTANYL CITRATE 50 UG/ML
INJECTION, SOLUTION INTRAMUSCULAR; INTRAVENOUS AS NEEDED
Status: DISCONTINUED | OUTPATIENT
Start: 2019-04-16 | End: 2019-04-16 | Stop reason: HOSPADM

## 2019-04-16 RX ADMIN — FENTANYL CITRATE 50 MCG: 50 INJECTION, SOLUTION INTRAMUSCULAR; INTRAVENOUS at 09:10

## 2019-04-16 RX ADMIN — PROPOFOL 50 MG: 10 INJECTION, EMULSION INTRAVENOUS at 10:17

## 2019-04-16 RX ADMIN — SODIUM CHLORIDE, SODIUM LACTATE, POTASSIUM CHLORIDE, AND CALCIUM CHLORIDE 125 ML/HR: 600; 310; 30; 20 INJECTION, SOLUTION INTRAVENOUS at 09:07

## 2019-04-16 RX ADMIN — SODIUM CHLORIDE, SODIUM LACTATE, POTASSIUM CHLORIDE, CALCIUM CHLORIDE: 600; 310; 30; 20 INJECTION, SOLUTION INTRAVENOUS at 09:10

## 2019-04-16 RX ADMIN — CEFAZOLIN SODIUM 2 G: 1 INJECTION, POWDER, FOR SOLUTION INTRAMUSCULAR; INTRAVENOUS at 09:29

## 2019-04-16 RX ADMIN — PROPOFOL 100 MCG/KG/MIN: 10 INJECTION, EMULSION INTRAVENOUS at 09:19

## 2019-04-16 RX ADMIN — PROPOFOL 100 MG: 10 INJECTION, EMULSION INTRAVENOUS at 09:19

## 2019-04-16 RX ADMIN — DEXAMETHASONE SODIUM PHOSPHATE 8 MG: 4 INJECTION, SOLUTION INTRA-ARTICULAR; INTRALESIONAL; INTRAMUSCULAR; INTRAVENOUS; SOFT TISSUE at 09:26

## 2019-04-16 RX ADMIN — ONDANSETRON 4 MG: 2 INJECTION INTRAMUSCULAR; INTRAVENOUS at 10:27

## 2019-04-16 RX ADMIN — PROPOFOL 50 MG: 10 INJECTION, EMULSION INTRAVENOUS at 10:19

## 2019-04-16 RX ADMIN — EPHEDRINE SULFATE 10 MG: 50 INJECTION, SOLUTION INTRAVENOUS at 09:26

## 2019-04-16 RX ADMIN — MIDAZOLAM HYDROCHLORIDE 2 MG: 1 INJECTION, SOLUTION INTRAMUSCULAR; INTRAVENOUS at 09:08

## 2019-04-16 RX ADMIN — MIDAZOLAM HYDROCHLORIDE 2 MG: 1 INJECTION, SOLUTION INTRAMUSCULAR; INTRAVENOUS at 09:09

## 2019-04-16 RX ADMIN — FENTANYL CITRATE 50 MCG: 50 INJECTION, SOLUTION INTRAMUSCULAR; INTRAVENOUS at 09:15

## 2019-04-16 RX ADMIN — MIDAZOLAM HYDROCHLORIDE 1 MG: 1 INJECTION, SOLUTION INTRAMUSCULAR; INTRAVENOUS at 09:10

## 2019-04-16 NOTE — ANESTHESIA PREPROCEDURE EVALUATION
Relevant Problems No relevant active problems Anesthetic History No history of anesthetic complications Review of Systems / Medical History Patient summary reviewed, nursing notes reviewed and pertinent labs reviewed Pulmonary Within defined limits Smoker Neuro/Psych Within defined limits Cardiovascular Within defined limits GI/Hepatic/Renal 
Within defined limits Endo/Other Within defined limits Morbid obesity and cancer Other Findings Physical Exam 
 
Airway Mallampati: II 
TM Distance: > 6 cm Neck ROM: normal range of motion Mouth opening: Normal 
 
 Cardiovascular Regular rate and rhythm,  S1 and S2 normal,  no murmur, click, rub, or gallop Dental 
No notable dental hx Pulmonary Breath sounds clear to auscultation Abdominal 
GI exam deferred Other Findings Anesthetic Plan ASA: 2 Anesthesia type: general 
 
 
 
 
Induction: Intravenous Anesthetic plan and risks discussed with: Patient

## 2019-04-16 NOTE — ROUTINE PROCESS
Patient: Shankar Adler MRN: [id number]  SSN: [id number]   YOB: 1995  Age: 21 y.o. Sex: female     Patient is status post Procedure(s):  PORTACATH  INSERTION.     Surgeon(s) and Role:     * Sandra De Leon MD - Primary    Local/Dose/Irrigation:  SEE MAR                                         Dressing/Packing:       Splint/Cast:  ]    Other:

## 2019-04-16 NOTE — BRIEF OP NOTE
BRIEF OPERATIVE NOTE    Date of Procedure: 4/16/2019   Preoperative Diagnosis: RIGHT BREAST CANCER, need iv access for chemo, right breast mass, suspicious right axillary node  Postoperative Diagnosis: same  Procedure(s):  PORTACATH  INSERTION left subclavian, RIGHT BREAST ULTRASOUND GUIDED NEEDLE BIOPSY AND CLIP PLACEMENT,RIGHT AXILLARY ULTRASOUND GUIDED NEEDLE BIOPSY AND CLIP PLACEMENT  Surgeon(s) and Role:     Mitch Mcguire MD - Primary         Surgical Assistant: Andrade Fall    Surgical Staff:  Circ-1: Samreen Capellan RN  Radiology Technician: Clare Brizuela  Registered Nurse Assistant: Roberta Macedo RN  Scrub Tech-1: Alize LITTLE  Event Time In Time Out   Incision Start 0935    Incision Close 1024      Anesthesia: General   Estimated Blood Loss: 10 ml  Specimens:   ID Type Source Tests Collected by Time Destination   1 : RIGHT BREAST MASS 1:OO BIOPSY Fresh Breast  Olu Haskins MD 4/16/2019 1004 Pathology   2 : RIGHT AXILLARY LYMPH NODE NEEDLE BIOPSY Fresh Lymph Node  Olu Haskins MD 4/16/2019 1011 Pathology      Findings: tip of port junction svc and atrium   Complications: none  Implants:   Implant Name Type Inv.  Item Serial No.  Lot No. LRB No. Used Action   PORT   NA  [id number] Left 1 Implanted     Dictated [id number]

## 2019-04-16 NOTE — DISCHARGE INSTRUCTIONS
Discharge Instructions from Dr. Saritha Hernadez    · I will call you with the pathology results, typically within 1 week from today. · You may shower, but no hot tubs, swimming pools, or baths until your incision is healed. · No heavy lifting with the affected extremity (nothing greater than 5 pounds), and limit its use for the next 4-5 days. · You may use an ice pack for comfort for the next couple of days, but do not place ice directly on the skin. Rather, use a towel or clothing to serve as a barrier between skin and ice to prevent injury. · If I placed a drain, follow the drain instructions provided, especially as you keep a record of the drain output. · Follow medication instructions carefully. No aspirin, ibuprofen, aleve x 1 week. May use tylenol instead of narcotic. Wear surgical dressings for 48 hours, then remove. Leave steristrips and glue intact. Wear supportive bra at all times. · You will have bruising and swelling  · Watch for signs of infection as listed below. · Redness  · Swelling  · Drainage from the incision or from your nipple that appears infected  · Fever over 101.5 degrees for consecutive readings, or over 99.5 if you are currently undergoing chemotherapy. · Call our office (number is below) for a follow-up appointment. · If you have any problems, our phone number is [phone]      DISCHARGE SUMMARY from Nurse    PATIENT INSTRUCTIONS:    After general anesthesia or intravenous sedation, for 24 hours or while taking prescription Narcotics:  · Limit your activities  · Do not drive and operate hazardous machinery  · Do not make important personal or business decisions  · Do  not drink alcoholic beverages  · If you have not urinated within 8 hours after discharge, please contact your surgeon on call.     Report the following to your surgeon:  · Excessive pain, swelling, redness or odor of or around the surgical area  · Temperature over 100.5  · Nausea and vomiting lasting longer than 4 hours or if unable to take medications  · Any signs of decreased circulation or nerve impairment to extremity: change in color, persistent  numbness, tingling, coldness or increase pain  · Any questions    What to do at Home:  Recommended activity: See surgical instructions, as noted above. If you experience any of the following symptoms noted above, please follow up with Macrina Camilo. .    *  Please give a list of your current medications to your Primary Care Provider. *  Please update this list whenever your medications are discontinued, doses are      changed, or new medications (including over-the-counter products) are added. *  Please carry medication information at all times in case of emergency situations. These are general instructions for a healthy lifestyle:    No smoking/ No tobacco products/ Avoid exposure to second hand smoke  Surgeon General's Warning:  Quitting smoking now greatly reduces serious risk to your health. Obesity, smoking, and sedentary lifestyle greatly increases your risk for illness    A healthy diet, regular physical exercise & weight monitoring are important for maintaining a healthy lifestyle    You may be retaining fluid if you have a history of heart failure or if you experience any of the following symptoms:  Weight gain of 3 pounds or more overnight or 5 pounds in a week, increased swelling in our hands or feet or shortness of breath while lying flat in bed. Please call your doctor as soon as you notice any of these symptoms; do not wait until your next office visit. Recognize signs and symptoms of STROKE:    F-face looks uneven    A-arms unable to move or move unevenly    S-speech slurred or non-existent    T-time-call 911 as soon as signs and symptoms begin-DO NOT go       Back to bed or wait to see if you get better-TIME IS BRAIN. Warning Signs of HEART ATTACK     Call 911 if you have these symptoms:   Chest discomfort.  Most heart attacks involve discomfort in the center of the chest that lasts more than a few minutes, or that goes away and comes back. It can feel like uncomfortable pressure, squeezing, fullness, or pain.  Discomfort in other areas of the upper body. Symptoms can include pain or discomfort in one or both arms, the back, neck, jaw, or stomach.  Shortness of breath with or without chest discomfort.  Other signs may include breaking out in a cold sweat, nausea, or lightheadedness. Don't wait more than five minutes to call 911 - MINUTES MATTER! Fast action can save your life. Calling 911 is almost always the fastest way to get lifesaving treatment. Emergency Medical Services staff can begin treatment when they arrive -- up to an hour sooner than if someone gets to the hospital by car. The discharge information has been reviewed with the patient and parent. The patient and parent verbalized understanding. Discharge medications reviewed with the patient and caregiver and appropriate educational materials and side effects teaching were provided.   ___________________________________________________________________________________________________________________________________

## 2019-04-16 NOTE — OP NOTES
[address]  OPERATIVE REPORT    Name:  Kinjal Ortiz  MR#:  [id number]  :  1995  ACCOUNT #:  [de-identified]  DATE OF SERVICE:  2019      PREOPERATIVE DIAGNOSES:  Right breast cancer, need IV access for chemotherapy; suspicious right breast mass; suspicious right axillary node. POSTOPERATIVE DIAGNOSES:  Right breast cancer, need IV access for chemotherapy; suspicious right breast mass; suspicious right axillary node. PROCEDURES PERFORMED:  Left subclavian port insertion, right breast ultrasound-guided needle biopsy with clip placement, and right axillary ultrasound-guided needle biopsy with clip placement. SURGEON:  Felipe Aguiar MD    ASSISTANT:  Velma Cai. ANESTHESIA:  General.    COMPLICATIONS:  None. SPECIMENS REMOVED:  1. Right breast mass, 1 o'clock biopsy. 2.  Right axillary lymph node biopsy. IMPLANTS:  8-[employer] Smart Port and [employer] clips. ESTIMATED BLOOD LOSS:  2 mL. FINDINGS:  Tip of port at junction of SVC and atrium. INDICATIONS FOR PROCEDURE:  This is a 20-year-old female with a poorly differentiated right breast carcinoma. She was evaluated by Medical Oncology and a decision was made to do neoadjuvant chemotherapy. After a breast MRI, it was noted that she had a suspicious right axillary node and an additional right breast mass at 1 o'clock and therefore she wished to also have needle biopsy of each during her port placement procedure. PROCEDURE IN DETAIL:  The patient was seen in the preop holding area where surgical site was marked by surgeon and informed consent was obtained. She was taken to the operating room and laid in the supine position, where LMA anesthesia was induced. Bilateral chest and right axilla were prepped and draped in the usual fashion. A time-out was performed. The patient was placed in Trendelenburg position.   An 18-gauge introducer needle was used to access the left subclavian vein, this went easily. The syringe was removed, the wire was threaded through the needle, the needle was removed and the wire was going towards the SVC and right atrium on fluoroscopy. Next, an incision was made around thewire. Bovie cautery was used to dissect the port pocket. The dilator sheath was threaded over the wire, the inner cannula and wire were removed, and the catheter was clamped and threaded through the sheath, and torn away 22 cm in the chest wall. Initially, the port catheter was kind of curved up going toward the right IJ. The catheter was cut, unclamped, and the wire was threaded back through the catheter to go toward the SVC and right atrium, and the catheter was advanced over the wire and clamped. Tip of this catheter was at the junction of SVC and right atrium after this. The wire was removed from the catheter. The catheter was clamped and cut again and threaded onto the port. The port was placed in the port pocket, aspirated and flushed well with injectable saline. Next, 20 mL of local anesthetic was injected at the port site. The port was secured on either side with a 3-0 Prolene. This aspirated and flushed well with injectable final heparin solution. The incision was closed with interrupted 3-0 Vicryl and 4-0 subcuticular Monocryl along with skin glue. Attention was turned to the right breast at 1 o'clock where the second additional lesion was identified using ultrasound guidance. 10 mL of local anesthetic under direct guidance was used to anesthetize the breast and then a stab incision was made with a 15 blade. The larger biopsy needle (Bard Marquee 14 gauge) was used to take three cores of the right breast mass and sent in formalin for permanent pathology. Pressure was applied on the site and Steri-Strips were applied on the incision as well as a dressing.   Attention was turned to the right axilla and a  right lower axillary lymph node with a thickened cortex was identified using ultrasound guidance. This was anesthetized with 10 mL of local.  Stab incision was made with a 15 blade. The biopsy needle was advanced and three cores of the axillary node were obtained and a clip was placed after this at both, of note in the breast site as well. Clip was placed in the right axillary node. Pressure was applied for 5 minutes. Steri-Strips were used to close the incision. Dressing was applied to the axillary incision as well. All sponge, needles, and instrument counts were correct. The patient went to recovery in stable condition.         MD JACQUE [id number]  D:  04/16/2019 10:42  T:  04/16/2019 13:13  JOB #:  [id number]

## 2019-04-16 NOTE — ANESTHESIA POSTPROCEDURE EVALUATION
Procedure(s): PORTACATH  INSERTION, RIGHT BREAST ULTRASOUND GUIDED NEEDLE BIOPSY AND CLIP PLACEMENT,RIGHT AXILLARY ULTRASOUND GUIDED NEEDLE BIOPSY AND CLIP PLACEMENT. general 
 
Anesthesia Post Evaluation Patient location during evaluation: PACU Patient participation: complete - patient participated Level of consciousness: awake and alert Pain management: adequate Airway patency: patent Anesthetic complications: no 
Cardiovascular status: acceptable Respiratory status: acceptable Hydration status: acceptable Comments: I have seen and evaluated the patient and is ready for discharge. Randee Silva MD 
 
Post anesthesia nausea and vomiting:  none Vitals Value Taken Time /71 4/16/2019 11:30 AM  
Temp 36.7 °C (98.1 °F) 4/16/2019 10:41 AM  
Pulse 65 4/16/2019 11:44 AM  
Resp 17 4/16/2019 11:44 AM  
SpO2 96 % 4/16/2019 11:44 AM  
Vitals shown include unvalidated device data.

## 2019-04-16 NOTE — PERIOP NOTES
POC upgrading prevented charting of tests. HCG is negative, HGB 11.o - reported to ADELE Canela. No orders available from  before patient taken to OR.

## 2019-04-17 RX ORDER — SODIUM CHLORIDE 0.9 % (FLUSH) 0.9 %
10 SYRINGE (ML) INJECTION AS NEEDED
Status: CANCELLED
Start: 2019-04-25

## 2019-04-17 RX ORDER — DIPHENHYDRAMINE HYDROCHLORIDE 50 MG/ML
50 INJECTION, SOLUTION INTRAMUSCULAR; INTRAVENOUS AS NEEDED
Status: CANCELLED
Start: 2019-04-25

## 2019-04-17 RX ORDER — HYDROCORTISONE SODIUM SUCCINATE 100 MG/2ML
100 INJECTION, POWDER, FOR SOLUTION INTRAMUSCULAR; INTRAVENOUS AS NEEDED
Status: CANCELLED | OUTPATIENT
Start: 2019-04-25

## 2019-04-17 RX ORDER — ACETAMINOPHEN 325 MG/1
650 TABLET ORAL AS NEEDED
Status: CANCELLED
Start: 2019-04-25

## 2019-04-17 RX ORDER — ALBUTEROL SULFATE 0.83 MG/ML
2.5 SOLUTION RESPIRATORY (INHALATION) AS NEEDED
Status: CANCELLED
Start: 2019-04-25

## 2019-04-17 RX ORDER — PALONOSETRON 0.05 MG/ML
0.25 INJECTION, SOLUTION INTRAVENOUS ONCE
Status: CANCELLED | OUTPATIENT
Start: 2019-04-25

## 2019-04-17 RX ORDER — HEPARIN 100 UNIT/ML
300-500 SYRINGE INTRAVENOUS AS NEEDED
Status: CANCELLED
Start: 2019-04-25

## 2019-04-17 RX ORDER — SODIUM CHLORIDE 9 MG/ML
25 INJECTION, SOLUTION INTRAVENOUS CONTINUOUS
Status: CANCELLED | OUTPATIENT
Start: 2019-04-25 | End: 2019-04-25

## 2019-04-17 RX ORDER — ONDANSETRON 2 MG/ML
8 INJECTION INTRAMUSCULAR; INTRAVENOUS AS NEEDED
Status: CANCELLED | OUTPATIENT
Start: 2019-04-25

## 2019-04-17 RX ORDER — DOXORUBICIN HYDROCHLORIDE 2 MG/ML
60 INJECTION, SOLUTION INTRAVENOUS ONCE
Status: CANCELLED
Start: 2019-04-25

## 2019-04-17 RX ORDER — SODIUM CHLORIDE 9 MG/ML
10 INJECTION INTRAMUSCULAR; INTRAVENOUS; SUBCUTANEOUS AS NEEDED
Status: CANCELLED | OUTPATIENT
Start: 2019-04-25

## 2019-04-17 RX ORDER — EPINEPHRINE 1 MG/ML
0.3 INJECTION, SOLUTION, CONCENTRATE INTRAVENOUS AS NEEDED
Status: CANCELLED | OUTPATIENT
Start: 2019-04-25

## 2019-04-19 ENCOUNTER — HOSPITAL ENCOUNTER (OUTPATIENT)
Dept: INFUSION THERAPY | Age: 24
Discharge: HOME | End: 2019-04-19
Payer: COMMERCIAL

## 2019-04-19 VITALS
SYSTOLIC BLOOD PRESSURE: 106 MMHG | TEMPERATURE: 98.3 F | RESPIRATION RATE: 18 BRPM | OXYGEN SATURATION: 100 % | DIASTOLIC BLOOD PRESSURE: 74 MMHG | HEART RATE: 92 BPM

## 2019-04-19 DIAGNOSIS — Z31.62 ENCOUNTER FOR FERTILITY PRESERVATION COUNSELING PRIOR TO CANCER THERAPY: Primary | ICD-10-CM

## 2019-04-19 DIAGNOSIS — C50.911 CARCINOMA OF RIGHT BREAST IN FEMALE, ESTROGEN RECEPTOR NEGATIVE, UNSPECIFIED SITE OF BREAST (HCC): ICD-10-CM

## 2019-04-19 DIAGNOSIS — Z17.1 CARCINOMA OF RIGHT BREAST IN FEMALE, ESTROGEN RECEPTOR NEGATIVE, UNSPECIFIED SITE OF BREAST (HCC): ICD-10-CM

## 2019-04-19 PROCEDURE — 96402 CHEMO HORMON ANTINEOPL SQ/IM: CPT

## 2019-04-19 PROCEDURE — 74011250636 HC RX REV CODE- 250/636: Performed by: INTERNAL MEDICINE

## 2019-04-19 RX ADMIN — LEUPROLIDE ACETATE 22.5 MG: KIT at 09:38

## 2019-04-19 NOTE — PROGRESS NOTES
56 Pt arrived at [facility] ambulatory and in no acute distress for first Lupron injection. Assessment complete, pt denies any questions or concerns. Discussed Lupron purpose, frequency, and possible side effects. Patient Vitals for the past 12 hrs: 
 Temp Pulse Resp BP SpO2  
04/19/19 0922 98.3 °F (36.8 °C) 92 18 106/74 100 % Medications Administered   
 leuprolide depot (LUPRON 3 MONTH) injection sykt 22.5 mg   
 Admin Date 
04/19/2019 Action Given Dose 22.5 mg Route IntraMUSCular Administered By 
Erica Pena RN Administered in left gluteus. 0945 Tolerated injection well, no adverse reaction noted. D/Cd from [facility] ambulatory and in no acute distress. Pt aware of next appointment on 04/25/19. Future Appointments Date Time Provider Giuliana Hatch 4/25/2019  9:45 AM Evelio Valadez NP [facility]  
4/25/2019 10:00 AM [facility] INFUSION NURSE 1 CANDI PERALTA [facility]  
5/9/2019 10:00 AM [facility] INFUSION NURSE 1 RCHOPIC PERALTA COM  
5/23/2019 10:00 AM [facility] INFUSION NURSE 1 CANDI PERALTA COM  
7/19/2019  9:00 AM [facility] INFUSION NURSE 1 CANDI PERALTA COM  
10/18/2019  9:00 AM [facility] INFUSION NURSE 1 CANDI PERALTA COM

## 2019-04-22 ENCOUNTER — TELEPHONE (OUTPATIENT)
Dept: CASE MANAGEMENT | Age: 24
End: 2019-04-22

## 2019-04-22 NOTE — TELEPHONE ENCOUNTER
ONCOLOGY NURSE NAVIGATOR    TC to pt, explained that EYAL had wig script, will send to [address] [facility] [place] for Thursday appt. Pt has Cycle 1 chemo rx. Discussed various options for wigs, [facility], [facility], [employer] [employer], [facility]. States has scripts waiting for her at [facility] (near her sister) EYAL contacted [facility] in [place] (on file) Zofran waiting to be picked up.       Vy Walters RN

## 2019-04-24 ENCOUNTER — TELEPHONE (OUTPATIENT)
Dept: CASE MANAGEMENT | Age: 24
End: 2019-04-24

## 2019-04-24 DIAGNOSIS — Z17.0 MALIGNANT NEOPLASM OF UPPER-OUTER QUADRANT OF RIGHT BREAST IN FEMALE, ESTROGEN RECEPTOR POSITIVE (HCC): Primary | ICD-10-CM

## 2019-04-24 DIAGNOSIS — C50.411 MALIGNANT NEOPLASM OF UPPER-OUTER QUADRANT OF RIGHT BREAST IN FEMALE, ESTROGEN RECEPTOR POSITIVE (HCC): Primary | ICD-10-CM

## 2019-04-24 RX ORDER — PROCHLORPERAZINE MALEATE 10 MG
5 TABLET ORAL
Qty: 60 TAB | Refills: 3 | Status: SHIPPED | OUTPATIENT
Start: 2019-04-24 | End: 2019-05-01

## 2019-04-24 RX ORDER — LIDOCAINE AND PRILOCAINE 25; 25 MG/G; MG/G
CREAM TOPICAL AS NEEDED
Qty: 30 G | Refills: 0 | Status: SHIPPED | OUTPATIENT
Start: 2019-04-24

## 2019-04-24 RX ORDER — ONDANSETRON 4 MG/1
4 TABLET, ORALLY DISINTEGRATING ORAL
Qty: 40 TAB | Refills: 1 | Status: SHIPPED | OUTPATIENT
Start: 2019-04-24

## 2019-04-24 NOTE — TELEPHONE ENCOUNTER
ONCOLOGY NURSE NAVIGATOR    TC to pt to verify Rx, states uses [facility] [address] Reports mom had script transferred from [address] [place] [address]. Advised pt she will have 2 PRN medications for nausea Compazine and Zofran. Pt asking about genetic testing results. Cycle 1 chemo tomorrow, messaged Dr. Karlos Calles. Appt w/ VBC 5/6/19.     Bianka Hendrickson RN

## 2019-04-25 ENCOUNTER — HOSPITAL ENCOUNTER (OUTPATIENT)
Dept: INFUSION THERAPY | Age: 24
Discharge: HOME | End: 2019-04-25
Payer: COMMERCIAL

## 2019-04-25 ENCOUNTER — OFFICE VISIT (OUTPATIENT)
Dept: ONCOLOGY | Age: 24
End: 2019-04-25

## 2019-04-25 VITALS
HEART RATE: 76 BPM | DIASTOLIC BLOOD PRESSURE: 69 MMHG | HEIGHT: 66 IN | WEIGHT: 217 LBS | RESPIRATION RATE: 18 BRPM | SYSTOLIC BLOOD PRESSURE: 120 MMHG | OXYGEN SATURATION: 98 % | BODY MASS INDEX: 34.87 KG/M2 | TEMPERATURE: 97.7 F

## 2019-04-25 VITALS
WEIGHT: 217.6 LBS | HEART RATE: 74 BPM | SYSTOLIC BLOOD PRESSURE: 94 MMHG | RESPIRATION RATE: 18 BRPM | DIASTOLIC BLOOD PRESSURE: 58 MMHG | HEIGHT: 67 IN | TEMPERATURE: 97.7 F | BODY MASS INDEX: 34.15 KG/M2 | OXYGEN SATURATION: 98 %

## 2019-04-25 DIAGNOSIS — C50.411 MALIGNANT NEOPLASM OF UPPER-OUTER QUADRANT OF RIGHT BREAST IN FEMALE, ESTROGEN RECEPTOR POSITIVE (HCC): Primary | ICD-10-CM

## 2019-04-25 DIAGNOSIS — C50.911 CARCINOMA OF RIGHT BREAST IN FEMALE, ESTROGEN RECEPTOR NEGATIVE, UNSPECIFIED SITE OF BREAST (HCC): Primary | ICD-10-CM

## 2019-04-25 DIAGNOSIS — Z17.0 MALIGNANT NEOPLASM OF UPPER-OUTER QUADRANT OF RIGHT BREAST IN FEMALE, ESTROGEN RECEPTOR POSITIVE (HCC): Primary | ICD-10-CM

## 2019-04-25 DIAGNOSIS — Z17.1 CARCINOMA OF RIGHT BREAST IN FEMALE, ESTROGEN RECEPTOR NEGATIVE, UNSPECIFIED SITE OF BREAST (HCC): Primary | ICD-10-CM

## 2019-04-25 LAB
ALBUMIN SERPL-MCNC: 3.4 G/DL (ref 3.5–5)
ALBUMIN/GLOB SERPL: 0.9 {RATIO} (ref 1.1–2.2)
ALP SERPL-CCNC: 78 U/L (ref 45–117)
ALT SERPL-CCNC: 14 U/L (ref 12–78)
ANION GAP SERPL CALC-SCNC: 6 MMOL/L (ref 5–15)
AST SERPL-CCNC: 15 U/L (ref 15–37)
BASOPHILS # BLD: 0 K/UL (ref 0–0.1)
BASOPHILS NFR BLD: 0 % (ref 0–1)
BILIRUB SERPL-MCNC: 0.4 MG/DL (ref 0.2–1)
BUN SERPL-MCNC: 11 MG/DL (ref 6–20)
BUN/CREAT SERPL: 12 (ref 12–20)
CALCIUM SERPL-MCNC: 8.7 MG/DL (ref 8.5–10.1)
CHLORIDE SERPL-SCNC: 103 MMOL/L (ref 97–108)
CO2 SERPL-SCNC: 29 MMOL/L (ref 21–32)
CREAT SERPL-MCNC: 0.95 MG/DL (ref 0.55–1.02)
DIFFERENTIAL METHOD BLD: ABNORMAL
EOSINOPHIL # BLD: 0.3 K/UL (ref 0–0.4)
EOSINOPHIL NFR BLD: 3 % (ref 0–7)
ERYTHROCYTE [DISTWIDTH] IN BLOOD BY AUTOMATED COUNT: 14 % (ref 11.5–14.5)
GLOBULIN SER CALC-MCNC: 3.7 G/DL (ref 2–4)
GLUCOSE SERPL-MCNC: 87 MG/DL (ref 65–100)
HCT VFR BLD AUTO: 34.1 % (ref 35–47)
HGB BLD-MCNC: 11.1 G/DL (ref 11.5–16)
IMM GRANULOCYTES # BLD AUTO: 0.1 K/UL (ref 0–0.04)
IMM GRANULOCYTES NFR BLD AUTO: 1 % (ref 0–0.5)
LYMPHOCYTES # BLD: 2.2 K/UL (ref 0.8–3.5)
LYMPHOCYTES NFR BLD: 20 % (ref 12–49)
MCH RBC QN AUTO: 23.5 PG (ref 26–34)
MCHC RBC AUTO-ENTMCNC: 32.6 G/DL (ref 30–36.5)
MCV RBC AUTO: 72.2 FL (ref 80–99)
MONOCYTES # BLD: 1.1 K/UL (ref 0–1)
MONOCYTES NFR BLD: 10 % (ref 5–13)
NEUTS SEG # BLD: 7.3 K/UL (ref 1.8–8)
NEUTS SEG NFR BLD: 67 % (ref 32–75)
NRBC # BLD: 0 K/UL (ref 0–0.01)
NRBC BLD-RTO: 0 PER 100 WBC
PLATELET # BLD AUTO: 156 K/UL (ref 150–400)
PMV BLD AUTO: ABNORMAL FL (ref 8.9–12.9)
POTASSIUM SERPL-SCNC: 4.1 MMOL/L (ref 3.5–5.1)
PROT SERPL-MCNC: 7.1 G/DL (ref 6.4–8.2)
RBC # BLD AUTO: 4.72 M/UL (ref 3.8–5.2)
SODIUM SERPL-SCNC: 138 MMOL/L (ref 136–145)
TROPONIN I SERPL-MCNC: <0.05 NG/ML
TROPONIN I SERPL-MCNC: <0.05 NG/ML
WBC # BLD AUTO: 10.9 K/UL (ref 3.6–11)

## 2019-04-25 PROCEDURE — 74011250636 HC RX REV CODE- 250/636

## 2019-04-25 PROCEDURE — 96409 CHEMO IV PUSH SNGL DRUG: CPT

## 2019-04-25 PROCEDURE — 96413 CHEMO IV INFUSION 1 HR: CPT

## 2019-04-25 PROCEDURE — 96367 TX/PROPH/DG ADDL SEQ IV INF: CPT

## 2019-04-25 PROCEDURE — 96411 CHEMO IV PUSH ADDL DRUG: CPT

## 2019-04-25 PROCEDURE — 85025 COMPLETE CBC W/AUTO DIFF WBC: CPT

## 2019-04-25 PROCEDURE — 74011250636 HC RX REV CODE- 250/636: Performed by: INTERNAL MEDICINE

## 2019-04-25 PROCEDURE — 96377 APPLICATON ON-BODY INJECTOR: CPT

## 2019-04-25 PROCEDURE — 80053 COMPREHEN METABOLIC PANEL: CPT

## 2019-04-25 PROCEDURE — 84484 ASSAY OF TROPONIN QUANT: CPT

## 2019-04-25 PROCEDURE — 77030012965 HC NDL HUBR BBMI -A

## 2019-04-25 PROCEDURE — 96375 TX/PRO/DX INJ NEW DRUG ADDON: CPT

## 2019-04-25 PROCEDURE — 36415 COLL VENOUS BLD VENIPUNCTURE: CPT

## 2019-04-25 PROCEDURE — 74011000258 HC RX REV CODE- 258: Performed by: INTERNAL MEDICINE

## 2019-04-25 RX ORDER — DOXORUBICIN HYDROCHLORIDE 2 MG/ML
30 INJECTION, SOLUTION INTRAVENOUS ONCE
Status: COMPLETED | OUTPATIENT
Start: 2019-04-25 | End: 2019-04-25

## 2019-04-25 RX ORDER — SODIUM CHLORIDE 9 MG/ML
25 INJECTION, SOLUTION INTRAVENOUS CONTINUOUS
Status: DISPENSED | OUTPATIENT
Start: 2019-04-25 | End: 2019-04-25

## 2019-04-25 RX ORDER — PALONOSETRON 0.05 MG/ML
0.25 INJECTION, SOLUTION INTRAVENOUS ONCE
Status: COMPLETED | OUTPATIENT
Start: 2019-04-25 | End: 2019-04-25

## 2019-04-25 RX ORDER — HEPARIN 100 UNIT/ML
300-500 SYRINGE INTRAVENOUS AS NEEDED
Status: ACTIVE | OUTPATIENT
Start: 2019-04-25 | End: 2019-04-25

## 2019-04-25 RX ORDER — SODIUM CHLORIDE 0.9 % (FLUSH) 0.9 %
10 SYRINGE (ML) INJECTION AS NEEDED
Status: ACTIVE | OUTPATIENT
Start: 2019-04-25 | End: 2019-04-25

## 2019-04-25 RX ORDER — SODIUM CHLORIDE 9 MG/ML
10 INJECTION INTRAMUSCULAR; INTRAVENOUS; SUBCUTANEOUS AS NEEDED
Status: ACTIVE | OUTPATIENT
Start: 2019-04-25 | End: 2019-04-25

## 2019-04-25 RX ADMIN — PEGFILGRASTIM 6 MG: KIT SUBCUTANEOUS at 14:15

## 2019-04-25 RX ADMIN — DEXAMETHASONE SODIUM PHOSPHATE 12 MG: 4 INJECTION, SOLUTION INTRA-ARTICULAR; INTRALESIONAL; INTRAMUSCULAR; INTRAVENOUS; SOFT TISSUE at 12:21

## 2019-04-25 RX ADMIN — Medication 10 ML: at 11:22

## 2019-04-25 RX ADMIN — CYCLOPHOSPHAMIDE 1290 MG: 1 INJECTION, POWDER, FOR SOLUTION INTRAVENOUS; ORAL at 13:20

## 2019-04-25 RX ADMIN — SODIUM CHLORIDE 25 ML/HR: 900 INJECTION, SOLUTION INTRAVENOUS at 12:15

## 2019-04-25 RX ADMIN — PALONOSETRON 0.25 MG: 0.05 INJECTION, SOLUTION INTRAVENOUS at 12:15

## 2019-04-25 RX ADMIN — DOXORUBICIN HYDROCHLORIDE 64.6 MG: 2 INJECTION, SOLUTION INTRAVENOUS at 13:12

## 2019-04-25 RX ADMIN — SODIUM CHLORIDE 150 MG: 900 INJECTION, SOLUTION INTRAVENOUS at 12:45

## 2019-04-25 NOTE — PROGRESS NOTES
[facility] Progress Note Date: 2019 Name: Shirley Bob MRN: [id number] : 1995 Ms. Wilfred Lyons Arrived ambulatory and in no distress for cycle 1 day 1 of AC regimen. Assessment was completed, no acute issues at this time, no new complaints voiced. Port accessed without difficulty, labs drawn and processed. Step by step of today's process discussed with the patient and her father. Opportunity for questions given to patient. Chemotherapy Flowsheet 2019 Cycle C1D1 Date 2019 Drug / Regimen AC Pre Meds given Notes given Ms. Ma's vitals were reviewed. Patient Vitals for the past 12 hrs: 
 Temp Pulse Resp BP SpO2  
19 1407  76  120/69   
19 1102 97.7 °F (36.5 °C) 74 18 94/58 98 % Lab results were obtained and reviewed. Recent Results (from the past 12 hour(s)) CBC WITH AUTOMATED DIFF Collection Time: 19 11:23 AM  
Result Value Ref Range WBC 10.9 3.6 - 11.0 K/uL  
 RBC 4.72 3.80 - 5.20 M/uL  
 HGB 11.1 (L) 11.5 - 16.0 g/dL HCT 34.1 (L) 35.0 - 47.0 % MCV 72.2 (L) 80.0 - 99.0 FL  
 MCH 23.5 (L) 26.0 - 34.0 PG  
 MCHC 32.6 30.0 - 36.5 g/dL  
 RDW 14.0 11.5 - 14.5 % PLATELET 568 627 - 982 K/uL MPV ABNORMAL 8.9 - 12.9 FL  
 NRBC 0.0 0  WBC ABSOLUTE NRBC 0.00 0.00 - 0.01 K/uL NEUTROPHILS 67 32 - 75 % LYMPHOCYTES 20 12 - 49 % MONOCYTES 10 5 - 13 % EOSINOPHILS 3 0 - 7 % BASOPHILS 0 0 - 1 % IMMATURE GRANULOCYTES 1 (H) 0.0 - 0.5 % ABS. NEUTROPHILS 7.3 1.8 - 8.0 K/UL  
 ABS. LYMPHOCYTES 2.2 0.8 - 3.5 K/UL  
 ABS. MONOCYTES 1.1 (H) 0.0 - 1.0 K/UL  
 ABS. EOSINOPHILS 0.3 0.0 - 0.4 K/UL  
 ABS. BASOPHILS 0.0 0.0 - 0.1 K/UL  
 ABS. IMM. GRANS. 0.1 (H) 0.00 - 0.04 K/UL  
 DF AUTOMATED METABOLIC PANEL, COMPREHENSIVE Collection Time: 19 11:23 AM  
Result Value Ref Range Sodium 138 136 - 145 mmol/L Potassium 4.1 3.5 - 5.1 mmol/L  Chloride 103 97 - 108 mmol/L  
 CO2 29 21 - 32 mmol/L Anion gap 6 5 - 15 mmol/L Glucose 87 65 - 100 mg/dL BUN 11 6 - 20 MG/DL Creatinine 0.95 0.55 - 1.02 MG/DL  
 BUN/Creatinine ratio 12 12 - 20 GFR est AA >60 >60 ml/min/1.73m2 GFR est non-AA >60 >60 ml/min/1.73m2 Calcium 8.7 8.5 - 10.1 MG/DL Bilirubin, total 0.4 0.2 - 1.0 MG/DL  
 ALT (SGPT) 14 12 - 78 U/L  
 AST (SGOT) 15 15 - 37 U/L Alk. phosphatase 78 45 - 117 U/L Protein, total 7.1 6.4 - 8.2 g/dL Albumin 3.4 (L) 3.5 - 5.0 g/dL Globulin 3.7 2.0 - 4.0 g/dL A-G Ratio 0.9 (L) 1.1 - 2.2    
TROPONIN I Collection Time: 04/25/19 11:23 AM  
Result Value Ref Range Troponin-I, Qt. <0.05 <0.05 ng/mL TROPONIN I Collection Time: 04/25/19  2:05 PM  
Result Value Ref Range Troponin-I, Qt. <0.05 <0.05 ng/mL Pre-medications  were administered as ordered and chemotherapy was initiated. Medications Administered 0.9% sodium chloride infusion Admin Date 
04/25/2019 Action New Bag Dose 25 mL/hr Rate 25 mL/hr Route IntraVENous Administered By 
Nicola Lerma RN  
  
  
 cyclophosphamide (CYTOXAN) 1,290 mg in 0.9% sodium chloride 250 mL, overfill volume 25 mL chemo infusion Admin Date 
04/25/2019 Action New Bag Dose 
1290 mg Rate 679 mL/hr Route IntraVENous Administered By 
Nicola Lerma RN  
  
  
 dexamethasone (DECADRON) 12 mg in 0.9% sodium chloride 50 mL, overfill volume 5 mL IVPB Admin Date 
04/25/2019 Action Given Dose 
12 mg Rate 232 mL/hr Route IntraVENous Administered By 
Nicola Lerma RN  
  
  
 DOXOrubicin (ADRIAMYCIN) chemo syringe 64.6 mg ++SYRINGE 1 of 2++ Admin Date 
04/25/2019 Action Given Dose 64.6 mg Route IntraVENous Administered By 
Nicola Lerma RN  
  
  
 DOXOrubicin (ADRIAMYCIN) chemo syringe 64.6 mg ++SYRINGE 2 of 2++ Admin Date 
04/25/2019 Action Given Dose 64.6 mg Route IntraVENous Administered By 
Nicola Lerma RN  
  
  
 fosaprepitant (EMEND) 150 mg in 0.9% sodium chloride 150 mL IVPB Admin Date 
04/25/2019 Action Given Dose 
150 mg Rate 450 mL/hr Route IntraVENous Administered By 
Mamta Roldan RN  
  
  
 palonosetron HCl (ALOXI) injection 0.25 mg   
 Admin Date 
04/25/2019 Action Given Dose 0.25 mg Route IntraVENous Administered By 
Mamta Roldan RN  
  
  
 pegfilgrastim (NEULASTA) wearable SQ injector 6 mg Admin Date 
04/25/2019 Action Given Dose 6 mg Route SubCUTAneous Administered By 
Mamta Roldan RN  
  
  
 saline peripheral flush soln 10 mL Admin Date 
04/25/2019 Action Given Dose 
10 mL Route InterCATHeter Administered By 
Mamta Roldan RN Education provided to patient about Neulasta On Body Injector including: side effects, how/when to remove the device, as well as what to do in the event of device malfunction. Opportunity for questions was provided and all questions were answered. Patient verbalized understanding. Patient instruction insert given to patient for reference at home. Ms. Alesia Palm tolerated treatment well, port flushed and de accessed, patient was discharged from [place] [address] in stable condition at  [id number]. Patient aware of future appointment. Future Appointments Date Time Provider Giuliana Hatch 5/6/2019 11:20 AM Gill Banuelos MD [facility] SANDRA SCHED  
5/9/2019 10:00 AM [facility] INFUSION NURSE 1 RCHOPIC PERALTA COM  
5/23/2019 10:00 AM [facility] INFUSION NURSE 1 RCHOPIC FABIAN COM  
7/19/2019  9:00 AM [facility] INFUSION NURSE 1 RCHOPIC PERALTA COM  
10/18/2019  9:00 AM [facility] INFUSION NURSE 1 RCHOPIC PERALTA COM Maryam Rg RN April 25, 2019

## 2019-04-25 NOTE — PROGRESS NOTES
C1D1 of AC regimen. Procedure explained to patient and her father. All questions and concerns answered.

## 2019-04-25 NOTE — PROGRESS NOTES
Chief Complaint   Patient presents with    Breast Cancer     infusion day     Patient states she is unsure of where she is supposed to go at 4:00 this afternoon, and was unsure if she needed to take any medications this morning before starting the infusion. 1. Have you been to the ER, urgent care clinic since your last visit? Hospitalized since your last visit? No    2. Have you seen or consulted any other health care providers outside of the [address] [place] [address] since your last visit? Include any pap smears or colon screening.  No    Visit Vitals  BP 94/58 (BP 1 Location: Left arm, BP Patient Position: Sitting)   Pulse 74   Temp 97.7 °F (36.5 °C) (Oral)   Resp 18   Ht 5' 6.5\" (1.689 m)   Wt 217 lb 9.6 oz (98.7 kg)   LMP 04/19/2019   SpO2 98%   BMI 34.60 kg/m²

## 2019-04-25 NOTE — PROGRESS NOTES
Spiritual Care Partner Volunteer visited patient in [facility] on 4/25/19. Documented by: 
Brittany Hernandez

## 2019-04-25 NOTE — INTERVAL H&P NOTE
H&P Update:  Lela Rao was seen and examined. History and physical has been reviewed. Significant clinical changes have occurred as noted:  Saw med onc needs port for access.  Port placement

## 2019-04-25 NOTE — PROGRESS NOTES
Follow-up Note      Patient: Rachel Hogue MRN: [id number]  SSN: [id number]    YOB: 1995  Age: 21 y.o. Sex: female        Diagnosis:     1. Right breast carcinoma:  T2 N0 M0 (Stage IIA) poorly differentiated carcinoma, Tumor size 2.4 cm, LN -ve, grade 3, ER 35%, TN 0%, Her 2 -ve      Treatment:     1. Neoadjuvant chemotherapy   Dose Dense AC - Cycle 1 Day 1       Subjective:      Rachel Hogue is a 21 y.o. female with a diagnosis of right sided breast carcinoma. She felt a lump in her right breast which grew over a period of 2 months. She underwent an USG and biopsy of the mass which reveals poorly differentiated carcinoma with ER 35% and TN 0 and Her 2 -ve. She saw Dr. Chauncey Vanegas for surgical options. She was found to be BRCA1 mutation. She is here today to start neoadjuvant chemotherapy. Review of Systems:    Constitutional: negative  Eyes: negative  Ears, Nose, Mouth, Throat, and Face: negative  Respiratory: negative  Cardiovascular: negative  Gastrointestinal: negative  Genitourinary:negative  Integument/Breast: right sided breast lump  Hematologic/Lymphatic: negative  Musculoskeletal:negative  Neurological: negative        Past Medical History:   Diagnosis Date    Anxiety     Cancer ([facility] [address] 2019    RIGHT BREAST    Depression     PTSD (post-traumatic stress disorder)     Suicidal intent      Past Surgical History:   Procedure Laterality Date    HX CHOLECYSTECTOMY  2013      Family History   Problem Relation Age of Onset    Hypertension Mother     Psychiatric Disorder Mother     Psychiatric Disorder Sister      Social History     Tobacco Use    Smoking status: Current Some Day Smoker     Types: Cigarettes    Smokeless tobacco: Never Used    Tobacco comment: black and mild every few days   Substance Use Topics    Alcohol use: Yes     Comment: 2-3 times  weekly      Prior to Admission medications    Medication Sig Start Date End Date Taking?  Authorizing Provider   ondansetron (ZOFRAN ODT) 4 mg disintegrating tablet Take 1 Tab by mouth every eight (8) hours as needed for Nausea. 4/24/19   Lizzie Ceron MD   lidocaine-prilocaine (EMLA) topical cream Apply  to affected area as needed for Pain. 4/24/19   Lizzie Ceron MD   prochlorperazine (COMPAZINE) 10 mg tablet Take 0.5 Tabs by mouth every six (6) hours as needed for Nausea for up to 7 days. 4/24/19 5/1/19  Lizzie Ceron MD   oseltamivir (TAMIFLU) 75 mg capsule take 1 capsule by mouth twice a day for 5 days 4/6/19   Provider, Historical          No Known Allergies        Objective:     Visit Vitals  BP 94/58 (BP 1 Location: Left arm, BP Patient Position: Sitting)   Pulse 74   Temp 97.7 °F (36.5 °C) (Oral)   Resp 18   Ht 5' 6.5\" (1.689 m)   Wt 217 lb 9.6 oz (98.7 kg)   LMP 04/19/2019   SpO2 98%   BMI 34.60 kg/m²       Pain Scale: 0 - No pain/10  Pain Location:       Physical Exam:    GENERAL: alert, cooperative, no distress, appears stated age  EYE: conjunctivae/corneas clear. PERRL, EOM's intact. Fundi benign  LYMPHATIC: Cervical, supraclavicular, and axillary nodes normal.   THROAT & NECK: normal and no erythema or exudates noted. LUNG: clear to auscultation bilaterally  HEART: regular rate and rhythm, S1, S2 normal, no murmur, click, rub or gallop  ABDOMEN: soft, non-tender. Bowel sounds normal. No masses,  no organomegaly  EXTREMITIES:  extremities normal, atraumatic, no cyanosis or edema  SKIN: Normal.  NEUROLOGIC: AOx3. Gait normal. Reflexes and motor strength normal and symmetric. Cranial nerves 2-12 and sensation grossly intact.       Lab Results   Component Value Date/Time    WBC 10.9 04/25/2019 11:23 AM    Hemoglobin (POC) 11.0 (L) 04/16/2019 09:04 AM    HGB 11.1 (L) 04/25/2019 11:23 AM    HCT 34.1 (L) 04/25/2019 11:23 AM    PLATELET 577 27/49/5264 11:23 AM    MCV 72.2 (L) 04/25/2019 11:23 AM       Lab Results   Component Value Date/Time    Sodium 138 04/25/2019 11:23 AM    Potassium 4.1 04/25/2019 11:23 AM    Chloride 103 04/25/2019 11:23 AM    CO2 29 04/25/2019 11:23 AM    Anion gap 6 04/25/2019 11:23 AM    Glucose 87 04/25/2019 11:23 AM    BUN 11 04/25/2019 11:23 AM    Creatinine 0.95 04/25/2019 11:23 AM    BUN/Creatinine ratio 12 04/25/2019 11:23 AM    GFR est AA >60 04/25/2019 11:23 AM    GFR est non-AA >60 04/25/2019 11:23 AM    Calcium 8.7 04/25/2019 11:23 AM    Bilirubin, total 0.4 04/25/2019 11:23 AM    AST (SGOT) 15 04/25/2019 11:23 AM    Alk. phosphatase 78 04/25/2019 11:23 AM    Protein, total 7.1 04/25/2019 11:23 AM    Albumin 3.4 (L) 04/25/2019 11:23 AM    Globulin 3.7 04/25/2019 11:23 AM    A-G Ratio 0.9 (L) 04/25/2019 11:23 AM    ALT (SGPT) 14 04/25/2019 11:23 AM           Assessment:     1. Right breast carcinoma:  T2 N0 M0 (Stage IIA) poorly differentiated carcinoma, Tumor size 2.4 cm, LN -ve, grade 3, ER 35%, MD 0%, Her 2 -ve        ECOG PS 0  Intent of Treatment - curative  Prognosis - good  BRCA1 +ve    Echocardiogram (4/15/2019): normal, LVEF 55-60%    Starting neoadjuvant chemotherapy   Adriamycin, Cyclophosphamide - Cycle 1 Day 1    Reviewed the expected side effects of chemotherapy and ways to manage them. Blood counts acceptable. Results reviewed with patient. This chemotherapy regimen has a high risk (>20%)  for febrile neutropenia. Therefore, we will administer Pegfilgrastim on-body to prevent chemotherapy-induced neutropenic complications. Plan:       · Proceed with C#1 of ddAC (Doxorubicin 60mg/m2 and Cyclophosphamide 600mg/m2) every 2 weeks x 4 cycles. · Baseline labwork (CBC, CMP) today and CBC, troponin, NTproBNP before every cycle  · Antiemetics prophylaxis: Fosaprepitant, ondansetron, and dexamethasone prior to each treatment. Dexamethasone 8mg PO daily and Ondanestron 8mg PO BID at home on days 2, 3, 4 of each cycle.   · PRN Antiemetics: Ondanestron, prochlorperazine, and lorazepam PRN at home  · Neulasta 6mg on-body  · Return to clinic in 2 weeks         Signed by: Berry Elliott, MD                     April 25, 2019          CC. Tessie Mejia MD  CC.  Jesse Kurtz NP

## 2019-04-28 ENCOUNTER — TELEPHONE (OUTPATIENT)
Dept: ONCOLOGY | Age: 24
End: 2019-04-28

## 2019-05-01 RX ORDER — HEPARIN 100 UNIT/ML
300-500 SYRINGE INTRAVENOUS AS NEEDED
Status: CANCELLED
Start: 2019-05-09

## 2019-05-01 RX ORDER — SODIUM CHLORIDE 9 MG/ML
10 INJECTION INTRAMUSCULAR; INTRAVENOUS; SUBCUTANEOUS AS NEEDED
Status: CANCELLED | OUTPATIENT
Start: 2019-05-09

## 2019-05-01 RX ORDER — SODIUM CHLORIDE 0.9 % (FLUSH) 0.9 %
10 SYRINGE (ML) INJECTION AS NEEDED
Status: CANCELLED
Start: 2019-05-23

## 2019-05-01 RX ORDER — DOXORUBICIN HYDROCHLORIDE 2 MG/ML
30 INJECTION, SOLUTION INTRAVENOUS ONCE
Status: CANCELLED
Start: 2019-05-23 | End: 2019-05-23

## 2019-05-01 RX ORDER — ACETAMINOPHEN 325 MG/1
650 TABLET ORAL AS NEEDED
Status: CANCELLED
Start: 2019-05-09

## 2019-05-01 RX ORDER — HYDROCORTISONE SODIUM SUCCINATE 100 MG/2ML
100 INJECTION, POWDER, FOR SOLUTION INTRAMUSCULAR; INTRAVENOUS AS NEEDED
Status: CANCELLED | OUTPATIENT
Start: 2019-05-09

## 2019-05-01 RX ORDER — ALBUTEROL SULFATE 0.83 MG/ML
2.5 SOLUTION RESPIRATORY (INHALATION) AS NEEDED
Status: CANCELLED
Start: 2019-05-09

## 2019-05-01 RX ORDER — SODIUM CHLORIDE 9 MG/ML
10 INJECTION INTRAMUSCULAR; INTRAVENOUS; SUBCUTANEOUS AS NEEDED
Status: CANCELLED | OUTPATIENT
Start: 2019-05-23

## 2019-05-01 RX ORDER — PALONOSETRON 0.05 MG/ML
0.25 INJECTION, SOLUTION INTRAVENOUS ONCE
Status: CANCELLED | OUTPATIENT
Start: 2019-06-06

## 2019-05-01 RX ORDER — DIPHENHYDRAMINE HYDROCHLORIDE 50 MG/ML
50 INJECTION, SOLUTION INTRAMUSCULAR; INTRAVENOUS AS NEEDED
Status: CANCELLED
Start: 2019-05-09

## 2019-05-01 RX ORDER — ACETAMINOPHEN 325 MG/1
650 TABLET ORAL AS NEEDED
Status: CANCELLED
Start: 2019-05-23

## 2019-05-01 RX ORDER — DIPHENHYDRAMINE HYDROCHLORIDE 50 MG/ML
50 INJECTION, SOLUTION INTRAMUSCULAR; INTRAVENOUS AS NEEDED
Status: CANCELLED
Start: 2019-06-06

## 2019-05-01 RX ORDER — SODIUM CHLORIDE 9 MG/ML
25 INJECTION, SOLUTION INTRAVENOUS CONTINUOUS
Status: CANCELLED | OUTPATIENT
Start: 2019-05-09 | End: 2019-05-09

## 2019-05-01 RX ORDER — PALONOSETRON 0.05 MG/ML
0.25 INJECTION, SOLUTION INTRAVENOUS ONCE
Status: CANCELLED | OUTPATIENT
Start: 2019-05-23

## 2019-05-01 RX ORDER — DIPHENHYDRAMINE HYDROCHLORIDE 50 MG/ML
50 INJECTION, SOLUTION INTRAMUSCULAR; INTRAVENOUS AS NEEDED
Status: CANCELLED
Start: 2019-05-23

## 2019-05-01 RX ORDER — DOXORUBICIN HYDROCHLORIDE 2 MG/ML
30 INJECTION, SOLUTION INTRAVENOUS ONCE
Status: CANCELLED
Start: 2019-05-09 | End: 2019-05-09

## 2019-05-01 RX ORDER — HYDROCORTISONE SODIUM SUCCINATE 100 MG/2ML
100 INJECTION, POWDER, FOR SOLUTION INTRAMUSCULAR; INTRAVENOUS AS NEEDED
Status: CANCELLED | OUTPATIENT
Start: 2019-06-06

## 2019-05-01 RX ORDER — EPINEPHRINE 1 MG/ML
0.3 INJECTION, SOLUTION, CONCENTRATE INTRAVENOUS AS NEEDED
Status: CANCELLED | OUTPATIENT
Start: 2019-05-09

## 2019-05-01 RX ORDER — ONDANSETRON 2 MG/ML
8 INJECTION INTRAMUSCULAR; INTRAVENOUS AS NEEDED
Status: CANCELLED | OUTPATIENT
Start: 2019-05-09

## 2019-05-01 RX ORDER — HEPARIN 100 UNIT/ML
300-500 SYRINGE INTRAVENOUS AS NEEDED
Status: CANCELLED
Start: 2019-06-06

## 2019-05-01 RX ORDER — SODIUM CHLORIDE 9 MG/ML
25 INJECTION, SOLUTION INTRAVENOUS CONTINUOUS
Status: CANCELLED | OUTPATIENT
Start: 2019-05-23 | End: 2019-05-23

## 2019-05-01 RX ORDER — ONDANSETRON 2 MG/ML
8 INJECTION INTRAMUSCULAR; INTRAVENOUS AS NEEDED
Status: CANCELLED | OUTPATIENT
Start: 2019-05-23

## 2019-05-01 RX ORDER — SODIUM CHLORIDE 0.9 % (FLUSH) 0.9 %
10 SYRINGE (ML) INJECTION AS NEEDED
Status: CANCELLED
Start: 2019-05-09

## 2019-05-01 RX ORDER — ACETAMINOPHEN 325 MG/1
650 TABLET ORAL AS NEEDED
Status: CANCELLED
Start: 2019-06-06

## 2019-05-01 RX ORDER — ONDANSETRON 2 MG/ML
8 INJECTION INTRAMUSCULAR; INTRAVENOUS AS NEEDED
Status: CANCELLED | OUTPATIENT
Start: 2019-06-06

## 2019-05-01 RX ORDER — PALONOSETRON 0.05 MG/ML
0.25 INJECTION, SOLUTION INTRAVENOUS ONCE
Status: CANCELLED | OUTPATIENT
Start: 2019-05-09

## 2019-05-01 RX ORDER — EPINEPHRINE 1 MG/ML
0.3 INJECTION, SOLUTION, CONCENTRATE INTRAVENOUS AS NEEDED
Status: CANCELLED | OUTPATIENT
Start: 2019-05-23

## 2019-05-01 RX ORDER — ALBUTEROL SULFATE 0.83 MG/ML
2.5 SOLUTION RESPIRATORY (INHALATION) AS NEEDED
Status: CANCELLED
Start: 2019-05-23

## 2019-05-01 RX ORDER — SODIUM CHLORIDE 0.9 % (FLUSH) 0.9 %
10 SYRINGE (ML) INJECTION AS NEEDED
Status: CANCELLED
Start: 2019-06-06

## 2019-05-01 RX ORDER — HEPARIN 100 UNIT/ML
300-500 SYRINGE INTRAVENOUS AS NEEDED
Status: CANCELLED
Start: 2019-05-23

## 2019-05-01 RX ORDER — SODIUM CHLORIDE 9 MG/ML
25 INJECTION, SOLUTION INTRAVENOUS CONTINUOUS
Status: CANCELLED | OUTPATIENT
Start: 2019-06-06

## 2019-05-01 RX ORDER — EPINEPHRINE 1 MG/ML
0.3 INJECTION, SOLUTION, CONCENTRATE INTRAVENOUS AS NEEDED
Status: CANCELLED | OUTPATIENT
Start: 2019-06-06

## 2019-05-01 RX ORDER — SODIUM CHLORIDE 9 MG/ML
10 INJECTION INTRAMUSCULAR; INTRAVENOUS; SUBCUTANEOUS AS NEEDED
Status: CANCELLED | OUTPATIENT
Start: 2019-06-06

## 2019-05-01 RX ORDER — DOXORUBICIN HYDROCHLORIDE 2 MG/ML
30 INJECTION, SOLUTION INTRAVENOUS ONCE
Status: CANCELLED
Start: 2019-06-06

## 2019-05-01 RX ORDER — HYDROCORTISONE SODIUM SUCCINATE 100 MG/2ML
100 INJECTION, POWDER, FOR SOLUTION INTRAMUSCULAR; INTRAVENOUS AS NEEDED
Status: CANCELLED | OUTPATIENT
Start: 2019-05-23

## 2019-05-01 RX ORDER — ALBUTEROL SULFATE 0.83 MG/ML
2.5 SOLUTION RESPIRATORY (INHALATION) AS NEEDED
Status: CANCELLED
Start: 2019-06-06

## 2019-05-02 ENCOUNTER — TELEPHONE (OUTPATIENT)
Dept: INTERNAL MEDICINE CLINIC | Age: 24
End: 2019-05-02

## 2019-05-06 ENCOUNTER — OFFICE VISIT (OUTPATIENT)
Dept: SURGERY | Age: 24
End: 2019-05-06

## 2019-05-06 VITALS
DIASTOLIC BLOOD PRESSURE: 64 MMHG | SYSTOLIC BLOOD PRESSURE: 106 MMHG | BODY MASS INDEX: 34.87 KG/M2 | HEIGHT: 66 IN | WEIGHT: 217 LBS | HEART RATE: 98 BPM

## 2019-05-06 DIAGNOSIS — C50.811 CANCER OF OVERLAPPING SITES OF RIGHT BREAST (HCC): Primary | ICD-10-CM

## 2019-05-06 DIAGNOSIS — Z15.01 BRCA1 GENE MUTATION POSITIVE: ICD-10-CM

## 2019-05-06 DIAGNOSIS — Z15.09 BRCA1 GENE MUTATION POSITIVE: ICD-10-CM

## 2019-05-06 NOTE — PATIENT INSTRUCTIONS
Managing Nausea From Cancer Treatment: Care Instructions  Overview    Cancer and the treatments for it can sometimes make you sick to your stomach (nauseated) or make you vomit. If these side effects aren't managed, you can lose too much fluid (dehydration). But you can work with your doctor to manage these problems. Your doctor may prescribe medicine to keep you from feeling sick to your stomach (anti-nausea medicine). You also can do a few things at home to help manage your nausea and feel better. Make sure to get enough to eat and drink so that you stay hydrated, prevent weight loss, and keep up your strength. Follow-up care is a key part of your treatment and safety. Be sure to make and go to all appointments, and call your doctor if you are having problems. It's also a good idea to know your test results and keep a list of the medicines you take. How can you care for yourself at home? Medicines  · Talk to your care team if you have nausea or are vomiting. These side effects from cancer treatment can almost always be controlled with medicine. If you are taking medicine and are still vomiting, you may need to try a different medicine. · Take your medicines exactly as prescribed. Call your doctor if you think you are having a problem with your medicine. · Don't smoke. Smoking and being around smoke can make nausea worse. If you need help quitting, talk to your doctor about stop-smoking programs and medicines. These can increase your chances of quitting for good. Eating and drinking  · To prevent dehydration, drink plenty of fluids. Choose water and other caffeine-free clear liquids. You may also try fruit juices, flavored ice pops, and broths. If you have kidney, heart, or liver disease and have to limit fluids, talk with your doctor before you increase the amount of fluids you drink. · Eat small, frequent meals or snacks.  When you don't feel like eating a meal, try apple or grape juice, weak teas, clear broths, dry toast, cooked cereal, or gelatin dessert. Avoid citrus juices and lemonade. · Make the most of the days when your appetite is good. Ask friends and family to help you shop and cook. Have meals delivered to your home. Or have lunch at a community or senior center. · Try frequent, small portions of meal supplements, such as Ensure, to get extra calories and protein. Try different kinds to find out which ones you like. Your doctor, nurse, or dietitian can help. They may have samples for you to try. · Don't force yourself to eat when you feel sick. Limit sounds, sights, and smells that make you feel sick. · Try eating food cool, cold, or at room temperature. · Have peppermint candy or peppermint gum handy. It can help settle your stomach. · Eat a light meal or snack before your chemotherapy so that you have something in your stomach. If your chemo takes several hours, bring a light meal or snacks. More ways to care for yourself  · Ask your doctor about other ways that you may find relief, such as:  ? Medical marijuana. It can be used to treat nausea and loss of appetite from cancer treatments. But in some states, it's illegal.  ? Acupuncture, progressive muscle relaxation, or biofeedback. ? Keeping a journal of your symptoms. Make sure to include the specific symptoms you have, the time of day, how long they last, and any foods or activities that seem to make them worse. This journal may help your doctor prescribe medicines to control your symptoms. When should you call for help? [id number] anytime you think you may need emergency care.  For example, call if:    · You passed out (lost consciousness).    Call your doctor now or seek immediate medical care if:    · You have a fever.     · You can't keep down fluids or medicines.     · You think you are dehydrated.     · You have new or worse belly pain.    Watch closely for changes in your health, and be sure to contact your doctor if:    · You have nausea and vomiting that doesn't go away after you take anti-nausea medicine.     · You do not get better as expected. Where can you learn more? Go to http://salvatore-david.info/. Enter 428 [id number] in the search box to learn more about \"Managing Nausea From Cancer Treatment: Care Instructions. \"  Current as of: March 27, 2018  Content Version: 11.9  © 3051-8758 [employer]. Care instructions adapted under license by [employer] (which disclaims liability or warranty for this information). If you have questions about a medical condition or this instruction, always ask your healthcare professional. [address] any warranty or liability for your use of this information.

## 2019-05-06 NOTE — PROGRESS NOTES
HISTORY OF PRESENT ILLNESS  Kathryn Hadley is a 21 y.o. female. HPI ESTABLISHED patient here for follow up RIGHT breast cancer. Currently undergoing neoadjuvant chemotherapy. Has had 1 cycle so far (4/25/19). Had a bad headache for 6 days after chemo. Also nausea. Went to [facility] ED for headache and was prescribed hydrocodone. Has chemo again this Thursday (5/9/19). Currently has a cold. The patient is BRCA 1 positive. Breast cancer-  3/26/19 - RIGHT breast biopsy. 21year old female RIGHT breast multicentric T2 N0 IDC er wp pr neg her 2 luz negative. Mammaprint high risk. 4/16/19 - port insertion and also biopsy of RIGHT breast mass which was positive. Lymph node biopsy was negative. 4/25/19 - started neoadjuvant chemotherapy - Dr. Josy Ambrose    No family history of breast or ovarian cancer. Patient is BRCA 1 positive Gracy Channel 4/22/19). Breast imaging-  MRI Results (most recent):  Results from Hospital Encounter encounter on 04/15/19   MRI BREAST BI W WO CONT    Narrative INDICATION: Right upper outer quadrant breast carcinoma. HISTORY (per electronic medical record): Poorly differentiated invasive ductal  carcinoma, T2, ER weak positive, WI negative, HER-2/luz negative. COMPARISON: Breast ultrasound and biopsy 3/26/2019. TECHNIQUE:  Multisequence, multiplanar, bilateral breast MRI was performed in prone position  using a dedicated breast coil. Images were obtained without contrast and dynamic  postcontrast images were obtained in multiple phases. 10 mL IV Gadavist was  administered. Subtraction images were reconstructed. Postcontrast images were  reviewed with dedicated kinetic analysis software. FINDINGS:  There is moderate background parenchymal enhancement and heterogeneous  fibroglandular tissue.     Right breast:  An 11:00-12:00 in the anterior and middle thirds, the mass corresponding to the  known invasive ductal carcinoma (with a biopsy clip in place) measures 30 x 29 x  34 mm, and shows mixed kinetics (predominantly plateau, but with both washout  and persistent components). Anteromedial to it, in the anterior third of the upper inner quadrant, a 6 x 7  mm, oval, circumscribed mass is T2 hypointense and shows mixed plateau and  persistent kinetics. Further anterior, and inferomedial to the known IDC, a 7 x 10 mm, oval,  lobulated, focus of enhancement in the anterior third of the upper-inner  quadrant is T2 hypointense and shows persistent kinetics (and only 50%  threshold). A very small right axillary lymph note is nonetheless concerning for metastases  due to focal, heterogeneous cortical thickening. It shows anterior cortical  thickening on images [id number] and [id number]. Small nodes superior to it show more  mildly, globally thickened cortices. A visible interpectoral (Jose) lymph node  measures 5 mm in short axis on image [id number]. It probably has a fatty hilum, and  is of indeterminate significance. Left breast:  No suspicious enhancing foci. No axillary or internal mammary chain  lymphadenopathy. A summary portfolio with key images has been sent from kinetic analysis software  to PACS. Impression IMPRESSION:   1. Known IDC at 11:00-12:00 in the right breast. BI-RADS 6.  2. Two, small, enhancing masses in the anterior third of the right upper inner  quadrant. BI-RADS 4.   3. Focal cortical thickening in a very small right axillary lymph node,  concerning for metastasis. Conspicuous right interpectoral (Jose) node of  indeterminate significance. 4. Overall assessment: BI-RADS Assessment Category 4: Suspicious abnormality. 5. Recommendation: If lumpectomy is contemplated, further evaluation and imaging  guided biopsy of the BIRADS 4 right breast masses is recommended. The findings were called to Dr. Ronit Jain on 4/15/2019 1:56 PM by Dr. Margo Meyer. [phone]        Review of Systems   All other systems reviewed and are negative.       Physical Exam   Pulmonary/Chest: Right breast exhibits mass (mass slightly smaller). Nursing note and vitals reviewed. port site intact    ASSESSMENT and PLAN    ICD-10-CM ICD-9-CM    1. Cancer of overlapping sites of right breast ([facility] [address] C50.811 174.8    2. BRCA1 gene mutation positive Z15.01 V84.01     Z15.09     Breast cancer-  3/26/19 - RIGHT breast biopsy. 21year old female RIGHT breast multicentric T2 N0 IDC er wp pr neg her 2 luz negative. Mammaprint high risk. 4/16/19 - port insertion and also biopsy of RIGHT breast mass which was positive. Lymph node biopsy was negative. 4/25/19 - started neoadjuvant chemotherapy - Dr. Judith Timmons    No family history of breast or ovarian cancer. Patient is BRCA 1 positive Salomon Esquivel 4/22/19).     - f/u in 6 weeks to assess response  - surgical plan will be bilateral skin sparing mastectomies, right sln biopsy since needle bx negative

## 2019-05-09 ENCOUNTER — OFFICE VISIT (OUTPATIENT)
Dept: ONCOLOGY | Age: 24
End: 2019-05-09

## 2019-05-09 ENCOUNTER — HOSPITAL ENCOUNTER (OUTPATIENT)
Dept: INFUSION THERAPY | Age: 24
Discharge: HOME | End: 2019-05-09
Payer: MEDICAID

## 2019-05-09 VITALS
WEIGHT: 210.7 LBS | TEMPERATURE: 98.4 F | HEART RATE: 96 BPM | DIASTOLIC BLOOD PRESSURE: 75 MMHG | OXYGEN SATURATION: 98 % | BODY MASS INDEX: 33.86 KG/M2 | HEIGHT: 66 IN | RESPIRATION RATE: 18 BRPM | SYSTOLIC BLOOD PRESSURE: 116 MMHG

## 2019-05-09 VITALS
HEIGHT: 66 IN | TEMPERATURE: 98.4 F | DIASTOLIC BLOOD PRESSURE: 81 MMHG | RESPIRATION RATE: 18 BRPM | HEART RATE: 94 BPM | OXYGEN SATURATION: 98 % | BODY MASS INDEX: 33.86 KG/M2 | SYSTOLIC BLOOD PRESSURE: 119 MMHG | WEIGHT: 210.7 LBS

## 2019-05-09 DIAGNOSIS — C50.411 MALIGNANT NEOPLASM OF UPPER-OUTER QUADRANT OF RIGHT BREAST IN FEMALE, ESTROGEN RECEPTOR POSITIVE (HCC): Primary | ICD-10-CM

## 2019-05-09 DIAGNOSIS — R11.2 CINV (CHEMOTHERAPY-INDUCED NAUSEA AND VOMITING): ICD-10-CM

## 2019-05-09 DIAGNOSIS — C50.911 CARCINOMA OF RIGHT BREAST IN FEMALE, ESTROGEN RECEPTOR NEGATIVE, UNSPECIFIED SITE OF BREAST (HCC): Primary | ICD-10-CM

## 2019-05-09 DIAGNOSIS — Z17.1 CARCINOMA OF RIGHT BREAST IN FEMALE, ESTROGEN RECEPTOR NEGATIVE, UNSPECIFIED SITE OF BREAST (HCC): Primary | ICD-10-CM

## 2019-05-09 DIAGNOSIS — T45.1X5A CINV (CHEMOTHERAPY-INDUCED NAUSEA AND VOMITING): ICD-10-CM

## 2019-05-09 DIAGNOSIS — Z17.0 MALIGNANT NEOPLASM OF UPPER-OUTER QUADRANT OF RIGHT BREAST IN FEMALE, ESTROGEN RECEPTOR POSITIVE (HCC): Primary | ICD-10-CM

## 2019-05-09 LAB
BASOPHILS # BLD: 0 K/UL (ref 0–0.1)
BASOPHILS NFR BLD: 0 % (ref 0–1)
DIFFERENTIAL METHOD BLD: ABNORMAL
EOSINOPHIL # BLD: 0 K/UL (ref 0–0.4)
EOSINOPHIL NFR BLD: 0 % (ref 0–7)
ERYTHROCYTE [DISTWIDTH] IN BLOOD BY AUTOMATED COUNT: 14.7 % (ref 11.5–14.5)
HCT VFR BLD AUTO: 32.6 % (ref 35–47)
HGB BLD-MCNC: 10.5 G/DL (ref 11.5–16)
IMM GRANULOCYTES # BLD AUTO: 0.8 K/UL (ref 0–0.04)
IMM GRANULOCYTES NFR BLD AUTO: 7 % (ref 0–0.5)
LYMPHOCYTES # BLD: 2.7 K/UL (ref 0.8–3.5)
LYMPHOCYTES NFR BLD: 22 % (ref 12–49)
MCH RBC QN AUTO: 23.3 PG (ref 26–34)
MCHC RBC AUTO-ENTMCNC: 32.2 G/DL (ref 30–36.5)
MCV RBC AUTO: 72.3 FL (ref 80–99)
MONOCYTES # BLD: 1 K/UL (ref 0–1)
MONOCYTES NFR BLD: 8 % (ref 5–13)
NEUTS SEG # BLD: 7.6 K/UL (ref 1.8–8)
NEUTS SEG NFR BLD: 63 % (ref 32–75)
NRBC # BLD: 0 K/UL (ref 0–0.01)
NRBC BLD-RTO: 0 PER 100 WBC
PLATELET # BLD AUTO: 162 K/UL (ref 150–400)
RBC # BLD AUTO: 4.51 M/UL (ref 3.8–5.2)
RBC MORPH BLD: ABNORMAL
TROPONIN I SERPL-MCNC: <0.05 NG/ML
WBC # BLD AUTO: 12.1 K/UL (ref 3.6–11)

## 2019-05-09 PROCEDURE — 84484 ASSAY OF TROPONIN QUANT: CPT

## 2019-05-09 PROCEDURE — 85025 COMPLETE CBC W/AUTO DIFF WBC: CPT

## 2019-05-09 PROCEDURE — 96367 TX/PROPH/DG ADDL SEQ IV INF: CPT

## 2019-05-09 PROCEDURE — 74011000250 HC RX REV CODE- 250: Performed by: NURSE PRACTITIONER

## 2019-05-09 PROCEDURE — 77030012965 HC NDL HUBR BBMI -A

## 2019-05-09 PROCEDURE — 96411 CHEMO IV PUSH ADDL DRUG: CPT

## 2019-05-09 PROCEDURE — 96375 TX/PRO/DX INJ NEW DRUG ADDON: CPT

## 2019-05-09 PROCEDURE — 74011250636 HC RX REV CODE- 250/636: Performed by: INTERNAL MEDICINE

## 2019-05-09 PROCEDURE — 74011250636 HC RX REV CODE- 250/636: Performed by: NURSE PRACTITIONER

## 2019-05-09 PROCEDURE — 74011000258 HC RX REV CODE- 258: Performed by: INTERNAL MEDICINE

## 2019-05-09 PROCEDURE — 96413 CHEMO IV INFUSION 1 HR: CPT

## 2019-05-09 PROCEDURE — 96377 APPLICATON ON-BODY INJECTOR: CPT

## 2019-05-09 PROCEDURE — 36415 COLL VENOUS BLD VENIPUNCTURE: CPT

## 2019-05-09 RX ORDER — DOXORUBICIN HYDROCHLORIDE 2 MG/ML
30 INJECTION, SOLUTION INTRAVENOUS ONCE
Status: COMPLETED | OUTPATIENT
Start: 2019-05-09 | End: 2019-05-09

## 2019-05-09 RX ORDER — SODIUM CHLORIDE 9 MG/ML
25 INJECTION, SOLUTION INTRAVENOUS CONTINUOUS
Status: DISPENSED | OUTPATIENT
Start: 2019-05-09 | End: 2019-05-09

## 2019-05-09 RX ORDER — PALONOSETRON 0.05 MG/ML
0.25 INJECTION, SOLUTION INTRAVENOUS ONCE
Status: COMPLETED | OUTPATIENT
Start: 2019-05-09 | End: 2019-05-09

## 2019-05-09 RX ORDER — PROCHLORPERAZINE MALEATE 10 MG
5 TABLET ORAL
COMMUNITY

## 2019-05-09 RX ORDER — SODIUM CHLORIDE 0.9 % (FLUSH) 0.9 %
10 SYRINGE (ML) INJECTION AS NEEDED
Status: ACTIVE | OUTPATIENT
Start: 2019-05-09 | End: 2019-05-09

## 2019-05-09 RX ADMIN — SODIUM CHLORIDE 150 MG: 900 INJECTION, SOLUTION INTRAVENOUS at 12:14

## 2019-05-09 RX ADMIN — DOXORUBICIN HYDROCHLORIDE 64.6 MG: 2 INJECTION, SOLUTION INTRAVENOUS at 13:33

## 2019-05-09 RX ADMIN — PEGFILGRASTIM 6 MG: KIT SUBCUTANEOUS at 14:20

## 2019-05-09 RX ADMIN — Medication 10 ML: at 10:47

## 2019-05-09 RX ADMIN — Medication 10 ML: at 14:18

## 2019-05-09 RX ADMIN — SODIUM CHLORIDE 25 ML/HR: 900 INJECTION, SOLUTION INTRAVENOUS at 10:49

## 2019-05-09 RX ADMIN — DOXORUBICIN HYDROCHLORIDE 64.6 MG: 2 INJECTION, SOLUTION INTRAVENOUS at 13:30

## 2019-05-09 RX ADMIN — PALONOSETRON 0.25 MG: 0.05 INJECTION, SOLUTION INTRAVENOUS at 12:41

## 2019-05-09 RX ADMIN — CYCLOPHOSPHAMIDE 1290 MG: 1 INJECTION, POWDER, FOR SOLUTION INTRAVENOUS; ORAL at 13:40

## 2019-05-09 RX ADMIN — SODIUM CHLORIDE 10 MG: 9 INJECTION INTRAMUSCULAR; INTRAVENOUS; SUBCUTANEOUS at 12:46

## 2019-05-09 RX ADMIN — DEXAMETHASONE SODIUM PHOSPHATE 12 MG: 4 INJECTION, SOLUTION INTRA-ARTICULAR; INTRALESIONAL; INTRAMUSCULAR; INTRAVENOUS; SOFT TISSUE at 11:51

## 2019-05-09 NOTE — PROGRESS NOTES
Follow-up Note      Patient: Cedric Wise MRN: [id number]  SSN: [id number]    YOB: 1995  Age: 21 y.o. Sex: female        Diagnosis:     1. Right breast carcinoma:  T2 N0 M0 (Stage IIA) poorly differentiated carcinoma, Tumor size 2.4 cm, LN -ve, grade 3, ER 35%, SD 0%, Her 2 -ve      Treatment:     1. Neoadjuvant chemotherapy   Dose Dense AC - Cycle 2 Day 1       Subjective:      Cedric Wise is a 21 y.o. female with a diagnosis of right sided breast carcinoma. She felt a lump in her right breast which grew over a period of 2 months. She underwent an USG and biopsy of the mass which reveals poorly differentiated carcinoma with ER 35% and SD 0 and Her 2 -ve. She saw Dr. Rudy Ann for surgical options. She was found to be BRCA1 mutation. She is here receiving neoadjuvant chemotherapy. She experienced nausea and headache after the first cycle of therapy. The symptoms have resolved.        Review of Systems:    Constitutional: negative  Eyes: negative  Ears, Nose, Mouth, Throat, and Face: negative  Respiratory: negative  Cardiovascular: negative  Gastrointestinal: negative  Genitourinary:negative  Integument/Breast: right sided breast lump  Hematologic/Lymphatic: negative  Musculoskeletal:negative  Neurological: negative        Past Medical History:   Diagnosis Date    Anxiety     Cancer ([facility] [address] 2019    RIGHT BREAST    Depression     PTSD (post-traumatic stress disorder)     Suicidal intent      Past Surgical History:   Procedure Laterality Date    HX CHOLECYSTECTOMY  2013      Family History   Problem Relation Age of Onset    Hypertension Mother     Psychiatric Disorder Mother     Psychiatric Disorder Sister      Social History     Tobacco Use    Smoking status: Current Some Day Smoker     Types: Cigarettes    Smokeless tobacco: Never Used    Tobacco comment: black and mild every few days   Substance Use Topics    Alcohol use: Yes     Comment: 2-3 times  weekly      Prior to Admission medications    Medication Sig Start Date End Date Taking? Authorizing Provider   prochlorperazine (COMPAZINE) 10 mg tablet Take 5 mg by mouth every six (6) hours as needed. Indications: Nausea and Vomiting caused by Cancer Drugs    Provider, Historical   ondansetron (ZOFRAN ODT) 4 mg disintegrating tablet Take 1 Tab by mouth every eight (8) hours as needed for Nausea. 4/24/19   Marilyn Smith MD   lidocaine-prilocaine (EMLA) topical cream Apply  to affected area as needed for Pain. 4/24/19   Marilyn Smith MD   oseltamivir (TAMIFLU) 75 mg capsule take 1 capsule by mouth twice a day for 5 days 4/6/19   Provider, Historical          No Known Allergies        Objective:     Visit Vitals  /81   Pulse 94   Temp 98.4 °F (36.9 °C)   Resp 18   Ht 5' 6\" (1.676 m)   Wt 210 lb 11.2 oz (95.6 kg)   LMP 04/19/2019   SpO2 98%   BMI 34.01 kg/m²       Pain Scale: /10  Pain Location:       Physical Exam:    GENERAL: alert, cooperative, no distress, appears stated age  EYE: conjunctivae/corneas clear. PERRL, EOM's intact. Fundi benign  LYMPHATIC: Cervical, supraclavicular, and axillary nodes normal.   THROAT & NECK: normal and no erythema or exudates noted. LUNG: clear to auscultation bilaterally  HEART: regular rate and rhythm, S1, S2 normal, no murmur, click, rub or gallop  ABDOMEN: soft, non-tender. Bowel sounds normal. No masses,  no organomegaly  EXTREMITIES:  extremities normal, atraumatic, no cyanosis or edema  SKIN: Normal.  NEUROLOGIC: AOx3. Gait normal. Reflexes and motor strength normal and symmetric. Cranial nerves 2-12 and sensation grossly intact.       Lab Results   Component Value Date/Time    WBC 12.1 (H) 05/09/2019 10:39 AM    Hemoglobin (POC) 11.0 (L) 04/16/2019 09:04 AM    HGB 10.5 (L) 05/09/2019 10:39 AM    HCT 32.6 (L) 05/09/2019 10:39 AM    PLATELET 887 71/26/6548 10:39 AM    MCV 72.3 (L) 05/09/2019 10:39 AM       Lab Results   Component Value Date/Time    Sodium 138 04/25/2019 11:23 AM    Potassium 4.1 04/25/2019 11:23 AM    Chloride 103 04/25/2019 11:23 AM    CO2 29 04/25/2019 11:23 AM    Anion gap 6 04/25/2019 11:23 AM    Glucose 87 04/25/2019 11:23 AM    BUN 11 04/25/2019 11:23 AM    Creatinine 0.95 04/25/2019 11:23 AM    BUN/Creatinine ratio 12 04/25/2019 11:23 AM    GFR est AA >60 04/25/2019 11:23 AM    GFR est non-AA >60 04/25/2019 11:23 AM    Calcium 8.7 04/25/2019 11:23 AM    Bilirubin, total 0.4 04/25/2019 11:23 AM    AST (SGOT) 15 04/25/2019 11:23 AM    Alk. phosphatase 78 04/25/2019 11:23 AM    Protein, total 7.1 04/25/2019 11:23 AM    Albumin 3.4 (L) 04/25/2019 11:23 AM    Globulin 3.7 04/25/2019 11:23 AM    A-G Ratio 0.9 (L) 04/25/2019 11:23 AM    ALT (SGPT) 14 04/25/2019 11:23 AM           Assessment:     1. Right breast carcinoma:  T2 N0 M0 (Stage IIA) poorly differentiated carcinoma, Tumor size 2.4 cm, LN -ve, grade 3, ER 35%, FL 0%, Her 2 -ve        ECOG PS 0  Intent of Treatment - curative  Prognosis - good  BRCA1 +ve    Echocardiogram (4/15/2019): normal, LVEF 55-60%    Receiving neoadjuvant chemotherapy   Adriamycin, Cyclophosphamide - Cycle 2 Day 1    Experienced nausea, vomiting and headache  Add compazine to pre-meds  Use NSAIDs for headache  I also educated her the best way to take the anti-emetics to prevent and manage CINV    Blood counts acceptable. Results reviewed with patient. This chemotherapy regimen has a high risk (>20%)  for febrile neutropenia. Therefore, we will administer Pegfilgrastim on-body to prevent chemotherapy-induced neutropenic complications. Plan:       · Proceed with C#2 of ddAC (Doxorubicin 60mg/m2 and Cyclophosphamide 600mg/m2) every 2 weeks x 4 cycles. · Baseline labwork (CBC, CMP) today and CBC, troponin, NTproBNP before every cycle  · Antiemetics prophylaxis: Fosaprepitant, ondansetron, and dexamethasone prior to each treatment. Dexamethasone 8mg PO daily and Ondanestron 8mg PO BID at home on days 2, 3, 4 of each cycle.   · PRN Antiemetics: Ondanestron, prochlorperazine, and lorazepam PRN at home  · Neulasta 6mg on-body  · Return to clinic in 2 weeks         Signed by: Kayla Gonzalez MD                     May 9, 2019          CC. Kim Salinas MD  CC.  Geraldo Berrios NP

## 2019-05-09 NOTE — PROGRESS NOTES
[facility] Progress Note Date: May 9, 2019 Name: Montse Schulz MRN: [id number] : 1995 Ms. Durga Nichols Arrived ambulatory and in no distress for cycle 2 day 1 of DDAC regimen. Assessment was completed, patient c/o nausea after cycle one lasting for about 6 days. Patient denies any nausea or vomiting at the present time. Port accessed without difficulty, labs drawn and processed. Education provided to patient about Neulasta On Body Injector including: side effects, how/when to remove the device, as well as what to do in the event of device malfunction. Opportunity for questions was provided and all questions were answered. Patient verbalized understanding. Chemotherapy Flowsheet 2019 Cycle C2D1 Date 2019 Drug / Regimen DDAC Pre Meds given Notes given Ms. Ma's vitals were reviewed. Patient Vitals for the past 12 hrs: 
 Temp Pulse Resp BP SpO2  
19 1412  96 18 116/75   
19 1030 98.4 °F (36.9 °C) 94 18 119/81 98 % Lab results were obtained and reviewed. Recent Results (from the past 12 hour(s)) CBC WITH AUTOMATED DIFF Collection Time: 19 10:39 AM  
Result Value Ref Range WBC 12.1 (H) 3.6 - 11.0 K/uL  
 RBC 4.51 3.80 - 5.20 M/uL  
 HGB 10.5 (L) 11.5 - 16.0 g/dL HCT 32.6 (L) 35.0 - 47.0 % MCV 72.3 (L) 80.0 - 99.0 FL  
 MCH 23.3 (L) 26.0 - 34.0 PG  
 MCHC 32.2 30.0 - 36.5 g/dL  
 RDW 14.7 (H) 11.5 - 14.5 % PLATELET 577 545 - 953 K/uL NRBC 0.0 0  WBC ABSOLUTE NRBC 0.00 0.00 - 0.01 K/uL NEUTROPHILS 63 32 - 75 % LYMPHOCYTES 22 12 - 49 % MONOCYTES 8 5 - 13 % EOSINOPHILS 0 0 - 7 % BASOPHILS 0 0 - 1 % IMMATURE GRANULOCYTES 7 (H) 0.0 - 0.5 % ABS. NEUTROPHILS 7.6 1.8 - 8.0 K/UL  
 ABS. LYMPHOCYTES 2.7 0.8 - 3.5 K/UL  
 ABS. MONOCYTES 1.0 0.0 - 1.0 K/UL  
 ABS. EOSINOPHILS 0.0 0.0 - 0.4 K/UL  
 ABS. BASOPHILS 0.0 0.0 - 0.1 K/UL  
 ABS. IMM.  GRANS. 0.8 (H) 0.00 - 0.04 K/UL  
 DF SMEAR SCANNED    
 RBC COMMENTS NORMOCYTIC, NORMOCHROMIC    
TROPONIN I Collection Time: 05/09/19  2:15 PM  
Result Value Ref Range Troponin-I, Qt. <0.05 <0.05 ng/mL Pre-medications  were administered as ordered and chemotherapy was initiated. Medications Administered 0.9% sodium chloride infusion Admin Date 05/09/2019 Action New Bag Dose 25 mL/hr Rate 25 mL/hr Route IntraVENous Administered By 
Ivana Sher RN  
  
  
 cyclophosphamide (CYTOXAN) 1,290 mg in 0.9% sodium chloride 250 mL, overfill volume 25 mL chemo infusion Admin Date 05/09/2019 Action New Bag Dose 
1290 mg Rate 679 mL/hr Route IntraVENous Administered By 
Ivana Sher RN  
  
  
 dexamethasone (DECADRON) 12 mg in 0.9% sodium chloride 50 mL, overfill volume 5 mL IVPB Admin Date 05/09/2019 Action Given Dose 
12 mg Rate 232 mL/hr Route IntraVENous Administered By 
Ivana Sher RN  
  
  
 DOXOrubicin (ADRIAMYCIN) chemo syringe 64.6 mg ++SYRINGE 1 of 2++ Admin Date 05/09/2019 Action Given Dose 64.6 mg Route IntraVENous Administered By 
Ivana Sher RN  
  
  
 DOXOrubicin (ADRIAMYCIN) chemo syringe 64.6 mg ++SYRINGE 2 of 2++ Admin Date 05/09/2019 Action Given Dose 64.6 mg Route IntraVENous Administered By 
Ivana Sher RN  
  
  
 fosaprepitant (EMEND) 150 mg in 0.9% sodium chloride 150 mL IVPB Admin Date 05/09/2019 Action Given Dose 
150 mg Rate 450 mL/hr Route IntraVENous Administered By 
Ivana Sher RN  
  
  
 palonosetron HCl (ALOXI) injection 0.25 mg   
 Admin Date 05/09/2019 Action Given Dose 0.25 mg Route IntraVENous Administered By 
Venecia Gomez RN  
  
  
 pegfilgrastim (NEULASTA) wearable SQ injector 6 mg Admin Date 05/09/2019 Action Given Dose 6 mg Route SubCUTAneous Administered By 
Ivana Sher RN  
  
  
 prochlorperazine (COMPAZINE) with saline injection 10 mg   
 Admin Date 05/09/2019 Action Given Dose 
10 mg Route IntraVENous Administered By 
Amrita Agosto RN  
  
  
 saline peripheral flush soln 10 mL Admin Date 05/09/2019 Action Given Dose 
10 mL Route InterCATHeter Administered By 
Mamta Roldan RN Admin Date 05/09/2019 Action Given Dose 
10 mL Route InterCATHeter Administered By 
Mamta Roldan RN  
  
  
  
 
 
 
 
Ms. Alesia Palm tolerated treatment well, port flushed and de accessed, patient was discharged from [place] [address] in stable condition at 1430. Patient aware of future appointment. Future Appointments Date Time Provider Giuliana Hatch 5/23/2019 10:00 AM [facility] INFUSION NURSE 1 [address] COM  
6/17/2019  1:30 PM Gill Banuelos MD [facility] SANDRA SCHED  
7/19/2019  9:00 AM [facility] INFUSION NURSE 1 Mahesh CRAMER [address] PERALTA [facility]  
10/18/2019  9:00 AM [facility] INFUSION NURSE 1 CANDI PERALTA [facility] Maryam Rg RN May 9, 2019

## 2019-05-09 NOTE — PROGRESS NOTES
Patient arrived for C2D1 of DDAC. Patient tolerating well. No questions r concerns regarding treatment.

## 2019-05-10 NOTE — PROGRESS NOTES
Visit charted 5/10/19. Spiritual Care Partner Volunteer visited patient in [place] [facility] on 5/9/19. Documented by: 
Ruben Hernandez

## 2019-05-13 ENCOUNTER — TELEPHONE (OUTPATIENT)
Dept: ONCOLOGY | Age: 24
End: 2019-05-13

## 2019-05-13 NOTE — TELEPHONE ENCOUNTER
Jullie Hammans called re Garrett .  Please call her back     [phone]    5/13/19 [facility]

## 2019-05-15 ENCOUNTER — OFFICE VISIT (OUTPATIENT)
Dept: INTERNAL MEDICINE CLINIC | Age: 24
End: 2019-05-15

## 2019-05-15 VITALS
WEIGHT: 209.1 LBS | RESPIRATION RATE: 18 BRPM | OXYGEN SATURATION: 98 % | SYSTOLIC BLOOD PRESSURE: 116 MMHG | TEMPERATURE: 98.9 F | DIASTOLIC BLOOD PRESSURE: 81 MMHG | HEIGHT: 66 IN | HEART RATE: 105 BPM | BODY MASS INDEX: 33.61 KG/M2

## 2019-05-15 DIAGNOSIS — N92.6 IRREGULAR MENSES: ICD-10-CM

## 2019-05-15 DIAGNOSIS — R73.9 ELEVATED BLOOD SUGAR: ICD-10-CM

## 2019-05-15 DIAGNOSIS — N76.0 ACUTE VAGINITIS: Primary | ICD-10-CM

## 2019-05-15 DIAGNOSIS — N89.8 VAGINAL IRRITATION: ICD-10-CM

## 2019-05-15 LAB
BILIRUB UR QL STRIP: NEGATIVE
GLUCOSE UR-MCNC: NEGATIVE MG/DL
HBA1C MFR BLD HPLC: 5.2 %
HCG URINE, QL. (POC): NEGATIVE
KETONES P FAST UR STRIP-MCNC: NEGATIVE MG/DL
PH UR STRIP: 7 [PH] (ref 4.6–8)
PROT UR QL STRIP: NEGATIVE
SP GR UR STRIP: 1.02 (ref 1–1.03)
UA UROBILINOGEN AMB POC: NORMAL (ref 0.2–1)
URINALYSIS CLARITY POC: CLEAR
URINALYSIS COLOR POC: YELLOW
URINE BLOOD POC: NEGATIVE
URINE LEUKOCYTES POC: NEGATIVE
URINE NITRITES POC: NEGATIVE
VALID INTERNAL CONTROL?: YES

## 2019-05-15 NOTE — PROGRESS NOTES
Pt here for   Chief Complaint   Patient presents with    Exposure to STD    Vaginal Itching     1. Have you been to the ER, urgent care clinic since your last visit? Hospitalized since your last visit? No    2. Have you seen or consulted any other health care providers outside of the [address] [place] [address] since your last visit? Include any pap smears or colon screening.  No         Pt denies pain at this time      3 most recent PHQ Screens 5/15/2019   PHQ Not Done -   Little interest or pleasure in doing things Not at all   Feeling down, depressed, irritable, or hopeless Not at all   Total Score PHQ 2 0

## 2019-05-15 NOTE — PROGRESS NOTES
Subjective: (As above and below)     Chief Complaint   Patient presents with    Exposure to STD    Vaginal Itching     Young Palomo is a 21y.o. year old female who presents for vaginal itching & irritation. Slight amount of thick discharge. No new partners, one male partner - not always using condoms. Not on birth control d/t side effects. She is followed by OBGYN. She believes she is current on pap smear. Right breast cancer: new diagnosis. She has been undergoing  neoadjuvant chemotherapy. She reports doing well. She has a positive outlook. She denies pain. She has nausea and has been working on this w/ oncology. She tested + for BRCA -1,she is urging the women in her family to be tested. Reviewed PmHx, RxHx, FmHx, SocHx, AllgHx and updated in chart. Family History   Problem Relation Age of Onset    Hypertension Mother     Psychiatric Disorder Mother     Psychiatric Disorder Sister        Past Medical History:   Diagnosis Date    Anxiety     Cancer [facility]) 2019    RIGHT BREAST    Depression     PTSD (post-traumatic stress disorder)     Suicidal intent       Social History     Socioeconomic History    Marital status: SINGLE     Spouse name: Not on file    Number of children: Not on file    Years of education: Not on file    Highest education level: Not on file   Tobacco Use    Smoking status: Current Some Day Smoker     Types: Cigarettes    Smokeless tobacco: Never Used    Tobacco comment: black and mild every few days   Substance and Sexual Activity    Alcohol use: Yes     Comment: 2-3 times  weekly    Drug use: No    Sexual activity: Yes     Partners: Male   Social History Narrative    9/12/17: unemployed, 18 mo daughter          Current Outpatient Medications   Medication Sig    prochlorperazine (COMPAZINE) 10 mg tablet Take 5 mg by mouth every six (6) hours as needed.  Indications: Nausea and Vomiting caused by Cancer Drugs    ondansetron (ZOFRAN ODT) 4 mg disintegrating tablet Take 1 Tab by mouth every eight (8) hours as needed for Nausea.  lidocaine-prilocaine (EMLA) topical cream Apply  to affected area as needed for Pain. No current facility-administered medications for this visit. Review of Systems:   Constitutional:    Negative for fever and chills, negative diaphoresis. HEENT:              Negative for neck pain and stiffness. Eyes:                  Negative for visual disturbance, itching, redness or discharge. Respiratory:        Negative for cough and shortness of breath. Cardiovascular:  Negative for chest pain and palpitations. Gastrointestinal: Negative for nausea, vomiting, abdominal pain, diarrhea or constipation. Genitourinary:     Negative for dysuria and frequency. Musculoskeletal: Negative for falls, tenderness and swelling. Skin:                    Negative for rash, masses or lesions. Neurological:       Negative for dizzyness, seizure, loss of consciousness, weakness and numbness.      Objective:     Vitals:    05/15/19 1022   BP: 116/81   Pulse: (!) 105   Resp: 18   Temp: 98.9 °F (37.2 °C)   TempSrc: Oral   SpO2: 98%   Weight: 209 lb 1.6 oz (94.8 kg)   Height: 5' 6\" (1.676 m)       Results for orders placed or performed in visit on 05/15/19   AMB POC URINE PREGNANCY TEST, VISUAL COLOR COMPARISON   Result Value Ref Range    VALID INTERNAL CONTROL POC Yes     HCG urine, Ql. (POC) Negative Negative   AMB POC URINALYSIS DIP STICK AUTO W/O MICRO   Result Value Ref Range    Color (UA POC) Yellow     Clarity (UA POC) Clear     Glucose (UA POC) Negative Negative    Bilirubin (UA POC) Negative Negative    Ketones (UA POC) Negative Negative    Specific gravity (UA POC) 1.020 1.001 - 1.035    Blood (UA POC) Negative Negative    pH (UA POC) 7.0 4.6 - 8.0    Protein (UA POC) Negative Negative    Urobilinogen (UA POC) 0.2 mg/dL 0.2 - 1    Nitrites (UA POC) Negative Negative    Leukocyte esterase (UA POC) Negative Negative   AMB POC HEMOGLOBIN A1C Result Value Ref Range    Hemoglobin A1c (POC) 5.2 %         Physical Examination: General appearance - alert, well appearing, and in no distress  Chest - clear to auscultation, no wheezes, rales or rhonchi, symmetric air entry  Heart - normal rate, regular rhythm, normal S1, S2, no murmurs, rubs, clicks or gallops  Pelvic - VULVA: normal appearing vulva with no masses, tenderness or lesions      Assessment/ Plan:      Discussed risk of pregnancy if sexually active and not using any form of back up contraception. .. She will f/u w/ OBGYN for recurrent yeast  Will defer treatment until nuswab back    1. Acute vaginitis    - NUSWAB VAGINITIS PLUS    2. Irregular menses    - AMB POC URINE PREGNANCY TEST, VISUAL COLOR COMPARISON    3. Vaginal irritation    - AMB POC URINALYSIS DIP STICK AUTO W/O MICRO    4. Elevated blood sugar  Checking for DM d/t c/o recurrent sugars  - AMB POC HEMOGLOBIN A1C        I have discussed the diagnosis with the patient and the intended plan as seen in the above orders. The patient has received an after-visit summary and questions were answered concerning future plans. Pt conveyed understanding of plan. Medication Side Effects and Warnings were discussed with patient: yes  Patient Labs were reviewed: yes  Patient Past Records were reviewed:  yes    Kathy Bui.  Seun Rucker NP

## 2019-05-20 DIAGNOSIS — B37.31 YEAST VAGINITIS: Primary | ICD-10-CM

## 2019-05-20 RX ORDER — FLUCONAZOLE 150 MG/1
150 TABLET ORAL DAILY
Qty: 1 TAB | Refills: 0 | Status: SHIPPED | OUTPATIENT
Start: 2019-05-20 | End: 2019-05-21

## 2019-05-20 NOTE — PROGRESS NOTES
05/20/19 called patient @ [phone] spoke to patient after verifying 2 patient identifiers, and notified patient of Lab results per provider request, patient also chose to have the pill sent in to her pharmacy and was notified of complication to pregnancy and not to take if she is  pregnant or feels like she is pregnant, Pt understood instructions Cristal Mcburney LPN

## 2019-05-22 ENCOUNTER — OFFICE VISIT (OUTPATIENT)
Dept: INTERNAL MEDICINE CLINIC | Age: 24
End: 2019-05-22

## 2019-05-22 VITALS
BODY MASS INDEX: 33.12 KG/M2 | DIASTOLIC BLOOD PRESSURE: 79 MMHG | RESPIRATION RATE: 18 BRPM | OXYGEN SATURATION: 98 % | WEIGHT: 206.1 LBS | HEIGHT: 66 IN | TEMPERATURE: 99.2 F | SYSTOLIC BLOOD PRESSURE: 108 MMHG | HEART RATE: 108 BPM

## 2019-05-22 DIAGNOSIS — H10.31 ACUTE BACTERIAL CONJUNCTIVITIS OF RIGHT EYE: Primary | ICD-10-CM

## 2019-05-22 RX ORDER — POLYMYXIN B SULFATE AND TRIMETHOPRIM 1; 10000 MG/ML; [USP'U]/ML
1 SOLUTION OPHTHALMIC EVERY 4 HOURS
Qty: 1 BOTTLE | Refills: 0 | Status: SHIPPED | OUTPATIENT
Start: 2019-05-22 | End: 2019-05-29

## 2019-05-22 NOTE — PROGRESS NOTES
Pt here for   Chief Complaint   Patient presents with    Eye Drainage    Eye Pain    Pink Eye     1. Have you been to the ER, urgent care clinic since your last visit? Hospitalized since your last visit? No    2. Have you seen or consulted any other health care providers outside of the [address] [place] [address] since your last visit? Include any pap smears or colon screening.  No         Pt denies pain at this time        3 most recent PHQ Screens 5/22/2019   PHQ Not Done -   Little interest or pleasure in doing things Several days   Feeling down, depressed, irritable, or hopeless Several days   Total Score PHQ 2 2

## 2019-05-22 NOTE — PATIENT INSTRUCTIONS
if not improving in 1-2 days go to the eye doctor or sooner if acute worsening      Pinkeye: Care Instructions  Your Care Instructions    Pinkeye is redness and swelling of the eye surface and the conjunctiva (the lining of the eyelid and the covering of the white part of the eye). Pinkeye is also called conjunctivitis. Pinkeye is often caused by infection with bacteria or a virus. Dry air, allergies, smoke, and chemicals are other common causes. Pinkeye often clears on its own in 7 to 10 days. Antibiotics only help if the pinkeye is caused by bacteria. Pinkeye caused by infection spreads easily. If an allergy or chemical is causing pinkeye, it will not go away unless you can avoid whatever is causing it. Follow-up care is a key part of your treatment and safety. Be sure to make and go to all appointments, and call your doctor if you are having problems. It's also a good idea to know your test results and keep a list of the medicines you take. How can you care for yourself at home? · Wash your hands often. Always wash them before and after you treat pinkeye or touch your eyes or face. · Use moist cotton or a clean, wet cloth to remove crust. Wipe from the inside corner of the eye to the outside. Use a clean part of the cloth for each wipe. · Put cold or warm wet cloths on your eye a few times a day if the eye hurts. · Do not wear contact lenses or eye makeup until the pinkeye is gone. Throw away any eye makeup you were using when you got pinkeye. Clean your contacts and storage case. If you wear disposable contacts, use a new pair when your eye has cleared and it is safe to wear contacts again. · If the doctor gave you antibiotic ointment or eyedrops, use them as directed. Use the medicine for as long as instructed, even if your eye starts looking better soon. Keep the bottle tip clean, and do not let it touch the eye area.   · To put in eyedrops or ointment:  ? Tilt your head back, and pull your lower eyelid down with one finger. ? Drop or squirt the medicine inside the lower lid. ? Close your eye for 30 to 60 seconds to let the drops or ointment move around. ? Do not touch the ointment or dropper tip to your eyelashes or any other surface. · Do not share towels, pillows, or washcloths while you have pinkeye. When should you call for help? Call your doctor now or seek immediate medical care if:    · You have pain in your eye, not just irritation on the surface.     · You have a change in vision or loss of vision.     · You have an increase in discharge from the eye.     · Your eye has not started to improve or begins to get worse within 48 hours after you start using antibiotics.     · Pinkeye lasts longer than 7 days.    Watch closely for changes in your health, and be sure to contact your doctor if you have any problems. Where can you learn more? Go to http://salvatore-david.info/. Enter Y392 in the search box to learn more about \"Pinkeye: Care Instructions. \"  Current as of: September 23, 2018  Content Version: 11.9  © 2206-2993 [employer]. Care instructions adapted under license by [employer] (which disclaims liability or warranty for this information). If you have questions about a medical condition or this instruction, always ask your healthcare professional. [address] any warranty or liability for your use of this information.

## 2019-05-22 NOTE — PROGRESS NOTES
Subjective: (As above and below)     Chief Complaint   Patient presents with   Paola Lele is a 21y.o. year old female who presents for possible pink eye    She has had eye redness, discomfort and drainage x 3 days. Drainage varies between clear to thicker grey. She denies any blurred vision. She has some discomfort (no sharp pain but she does feel like something is in it) to her eye and eye redness. She does not wear contacts. Glasses are up to date. She denies any eye trauma. Nobody around w/ pink eye. Her eye does not itch. No other allergy type symptoms          Reviewed PmHx, RxHx, FmHx, SocHx, AllgHx and updated in chart. Family History   Problem Relation Age of Onset    Hypertension Mother     Psychiatric Disorder Mother     Psychiatric Disorder Sister        Past Medical History:   Diagnosis Date    Anxiety     Cancer [facility]) 2019    RIGHT BREAST    Depression     PTSD (post-traumatic stress disorder)     Suicidal intent       Social History     Socioeconomic History    Marital status: SINGLE     Spouse name: Not on file    Number of children: Not on file    Years of education: Not on file    Highest education level: Not on file   Tobacco Use    Smoking status: Current Some Day Smoker     Types: Cigarettes    Smokeless tobacco: Never Used    Tobacco comment: black and mild every few days   Substance and Sexual Activity    Alcohol use: Yes     Comment: 2-3 times  weekly    Drug use: No    Sexual activity: Yes     Partners: Male   Social History Narrative    9/12/17: unemployed, 18 mo daughter          Current Outpatient Medications   Medication Sig    prochlorperazine (COMPAZINE) 10 mg tablet Take 5 mg by mouth every six (6) hours as needed. Indications: Nausea and Vomiting caused by Cancer Drugs    ondansetron (ZOFRAN ODT) 4 mg disintegrating tablet Take 1 Tab by mouth every eight (8) hours as needed for Nausea.     lidocaine-prilocaine (EMLA) topical cream Apply  to affected area as needed for Pain. No current facility-administered medications for this visit. Review of Systems:   Constitutional:    Negative for fever and chills, negative diaphoresis. HEENT:              Negative for neck pain and stiffness. Eyes:                  See above   Respiratory:        Negative for cough and shortness of breath. Cardiovascular:  Negative for chest pain and palpitations. Gastrointestinal: Negative for nausea, vomiting, abdominal pain, diarrhea or constipation. Genitourinary:     Negative for dysuria and frequency. Musculoskeletal: Negative for falls, tenderness and swelling. Skin:                    Negative for rash, masses or lesions. Neurological:       Negative for dizzyness, seizure, loss of consciousness, weakness and numbness.      Objective:     Vitals:    05/22/19 1141   BP: 108/79   Pulse: (!) 108   Resp: 18   Temp: 99.2 °F (37.3 °C)   TempSrc: Oral   SpO2: 98%   Weight: 206 lb 1.6 oz (93.5 kg)   Height: 5' 6\" (1.676 m)       Results for orders placed or performed in visit on 05/15/19   NUAB VAGINITIS PLUS   Result Value Ref Range    Atopobium vaginae Low - 0 Score    BVAB 2 Low - 0 Score    Megasphaera 1 Low - 0 Score    C. albicans, NOEL Positive (A) Negative    C. glabrata, NOEL Negative Negative    T. vaginalis, NOEL Negative Negative    C. trachomatis, NOEL Negative Negative    N. gonorrhoeae, NOEL Negative Negative   AMB POC URINE PREGNANCY TEST, VISUAL COLOR COMPARISON   Result Value Ref Range    VALID INTERNAL CONTROL POC Yes     HCG urine, Ql. (POC) Negative Negative   AMB POC URINALYSIS DIP STICK AUTO W/O MICRO   Result Value Ref Range    Color (UA POC) Yellow     Clarity (UA POC) Clear     Glucose (UA POC) Negative Negative    Bilirubin (UA POC) Negative Negative    Ketones (UA POC) Negative Negative    Specific gravity (UA POC) 1.020 1.001 - 1.035    Blood (UA POC) Negative Negative    pH (UA POC) 7.0 4.6 - 8.0 Protein (UA POC) Negative Negative    Urobilinogen (UA POC) 0.2 mg/dL 0.2 - 1    Nitrites (UA POC) Negative Negative    Leukocyte esterase (UA POC) Negative Negative   AMB POC HEMOGLOBIN A1C   Result Value Ref Range    Hemoglobin A1c (POC) 5.2 %         Physical Examination: General appearance - alert, well appearing, and in no distress  Eyes - left eye normal, right eye: redness, no swelling, pupils: ARAM  She has clear drainage w/ some thicker yellow-tyler mucous to the inner corner. Assessment/ Plan:      1. Acute bacterial conjunctivitis of right eyeDiscussed possibility that she may have a corneal abrasion if not improving by tomorrow w/ drops then needs f/u w/ eye dr    - trimethoprim-polymyxin b (POLYTRIM) ophthalmic solution; Administer 1 Drop to both eyes every four (4) hours for 7 days. Dispense: 1 Bottle; Refill: 0        I have discussed the diagnosis with the patient and the intended plan as seen in the above orders. The patient has received an after-visit summary and questions were answered concerning future plans. Pt conveyed understanding of plan. Medication Side Effects and Warnings were discussed with patient: yes  Patient Labs were reviewed: yes  Patient Past Records were reviewed:  yes    Morteza Mantilla.  Edelmira Hernandez NP

## 2019-05-23 ENCOUNTER — TELEPHONE (OUTPATIENT)
Dept: ONCOLOGY | Age: 24
End: 2019-05-23

## 2019-05-23 ENCOUNTER — DOCUMENTATION ONLY (OUTPATIENT)
Dept: ONCOLOGY | Age: 24
End: 2019-05-23

## 2019-05-23 NOTE — PROGRESS NOTES
[employer]  Social Work Navigator Encounter     Patient Name:   Arthur Shelton     Medical History: dx breast cancer    Advance Directives:    Narrative: Per [facility] she saw Dr. Prince Hadley and should start treatment there tomorrow, May 24th. They are [facility]. Dilip Christian will follow up if she is going to stay with Dr. Marveen Homans.      Barriers to Care:       Plan:

## 2019-05-23 NOTE — TELEPHONE ENCOUNTER
[employer]  Social Work Navigator Encounter     Patient Name:  Young Palomo    Medical History: breast cancer    Advance Directives:    Narrative: SW asked pt mother for feedback as to why pt might have sought a 2nd opinion. 1.  She asked for information on [facility] - ? And she didn't get it  2. Medicine for port - nobody explained that she was to use each time  3. When she went to have port placed - they left IV in her arm. Barriers to Care:     Plan:    Maybe too much info in one day? Not absorbing at the end of appt?

## 2019-07-03 ENCOUNTER — DOCUMENTATION ONLY (OUTPATIENT)
Dept: SURGERY | Age: 24
End: 2019-07-03

## 2019-07-03 NOTE — PROGRESS NOTES
Received a faxed request from [facility] for medical records from 4/1/18 to present.  Request was faxed to danna @ [phone] on 7/2/19

## 2019-07-19 ENCOUNTER — HOSPITAL ENCOUNTER (OUTPATIENT)
Dept: INFUSION THERAPY | Age: 24
End: 2019-07-19

## 2019-10-18 ENCOUNTER — APPOINTMENT (OUTPATIENT)
Dept: INFUSION THERAPY | Age: 24
End: 2019-10-18

## 2019-12-13 ENCOUNTER — OFFICE VISIT (OUTPATIENT)
Dept: INTERNAL MEDICINE CLINIC | Age: 24
End: 2019-12-13

## 2019-12-13 VITALS
OXYGEN SATURATION: 100 % | DIASTOLIC BLOOD PRESSURE: 69 MMHG | BODY MASS INDEX: 31.5 KG/M2 | RESPIRATION RATE: 18 BRPM | HEIGHT: 66 IN | WEIGHT: 196 LBS | TEMPERATURE: 98 F | SYSTOLIC BLOOD PRESSURE: 100 MMHG | HEART RATE: 99 BPM

## 2019-12-13 DIAGNOSIS — F32.A ANXIETY AND DEPRESSION: Primary | ICD-10-CM

## 2019-12-13 DIAGNOSIS — F43.10 PTSD (POST-TRAUMATIC STRESS DISORDER): ICD-10-CM

## 2019-12-13 DIAGNOSIS — F41.9 ANXIETY AND DEPRESSION: Primary | ICD-10-CM

## 2019-12-13 RX ORDER — BUSPIRONE HYDROCHLORIDE 7.5 MG/1
7.5 TABLET ORAL 3 TIMES DAILY
Qty: 90 TAB | Refills: 0 | Status: SHIPPED | OUTPATIENT
Start: 2019-12-13

## 2019-12-13 RX ORDER — TAMOXIFEN CITRATE 10 MG/1
TABLET, FILM COATED ORAL DAILY
COMMUNITY

## 2019-12-13 NOTE — PROGRESS NOTES
3 most recent PHQ Screens 12/13/2019   PHQ Not Done Active Diagnosis of Depression or Bipolar Disorder   Little interest or pleasure in doing things Nearly every day   Feeling down, depressed, irritable, or hopeless Nearly every day   Total Score PHQ 2 6   Trouble falling or staying asleep, or sleeping too much Several days   Feeling tired or having little energy Nearly every day   Poor appetite, weight loss, or overeating Nearly every day   Feeling bad about yourself - or that you are a failure or have let yourself or your family down Nearly every day   Trouble concentrating on things such as school, work, reading, or watching TV Not at all   Moving or speaking so slowly that other people could have noticed; or the opposite being so fidgety that others notice Not at all   Thoughts of being better off dead, or hurting yourself in some way Nearly every day   PHQ 9 Score 19   How difficult have these problems made it for you to do your work, take care of your home and get along with others Somewhat difficult     Pt is here for   Chief Complaint   Patient presents with    Depression     pt states that she would like to discuss starting medication for mental health      Pt states that she does have thoughts of hurting herself, pt states that she does not have a plan or intentions of acting on it    Pt denies pain at this time. 1. Have you been to the ER, urgent care clinic since your last visit? Hospitalized since your last visit? No    2. Have you seen or consulted any other health care providers outside of the [address] [place] [address] since your last visit? Include any pap smears or colon screening.  No

## 2019-12-13 NOTE — PROGRESS NOTES
Subjective: (As above and below)     Chief Complaint   Patient presents with    Depression     pt states that she would like to discuss starting medication for mental health      Ricardo Crook is a 25y.o. year old female who presents for depression and anxiety    She has a past diagnosis of PTSD. She reports increasing panic attacks, difficulty sleeping and sadness. She has had passive suicidal thoughts. She is not currently on medication. She was admitted to [facility] in July for suicidal ideation and she never got the prescriptions filled bc she was feeling better. She recalls bad reaction w/ zoloft in the past - made her more aggressive. Was also on risperidone in the past, does not recall effectiveness    She does recall being on buspar which she thinks helped some    She has an appt w/ psychiatry on next month! . She has a therapist in mind that she would like to see but does not feel ready yet    She has a 2 y/o and support from her boyfriend. She has been reaching out to family more and trying not to isolate. Reviewed PmHx, RxHx, FmHx, SocHx, AllgHx and updated in chart.   Family History   Problem Relation Age of Onset    Hypertension Mother     Psychiatric Disorder Mother     Psychiatric Disorder Sister        Past Medical History:   Diagnosis Date    Anxiety     Cancer [facility]) 2019    RIGHT BREAST    Depression     PTSD (post-traumatic stress disorder)     Suicidal intent       Social History     Socioeconomic History    Marital status: SINGLE     Spouse name: Not on file    Number of children: Not on file    Years of education: Not on file    Highest education level: Not on file   Tobacco Use    Smoking status: Current Some Day Smoker     Types: Cigarettes    Smokeless tobacco: Never Used    Tobacco comment: black and mild every few days   Substance and Sexual Activity    Alcohol use: Yes     Comment: 2-3 times  weekly    Drug use: No    Sexual activity: Yes     Partners: Male Social History Narrative    9/12/17: unemployed, 18 mo daughter          Current Outpatient Medications   Medication Sig    busPIRone (BUSPAR) 7.5 mg tablet Take 1 Tab by mouth three (3) times daily.  tamoxifen (NOLVADEX) 10 mg tablet Take  by mouth daily.  prochlorperazine (COMPAZINE) 10 mg tablet Take 5 mg by mouth every six (6) hours as needed. Indications: Nausea and Vomiting caused by Cancer Drugs    ondansetron (ZOFRAN ODT) 4 mg disintegrating tablet Take 1 Tab by mouth every eight (8) hours as needed for Nausea.  lidocaine-prilocaine (EMLA) topical cream Apply  to affected area as needed for Pain. No current facility-administered medications for this visit. Review of Systems:   Constitutional:    Negative for fever and chills, negative diaphoresis. HEENT:              Negative for neck pain and stiffness. Eyes:                  Negative for visual disturbance, itching, redness or discharge. Respiratory:        Negative for cough and shortness of breath. Cardiovascular:  Negative for chest pain and palpitations. Gastrointestinal: Negative for nausea, vomiting, abdominal pain, diarrhea or constipation. Genitourinary:     Negative for dysuria and frequency. Musculoskeletal: Negative for falls, tenderness and swelling. Skin:                    Negative for rash, masses or lesions. Neurological:       Negative for dizzyness, seizure, loss of consciousness, weakness and numbness.      Objective:     Vitals:    12/13/19 1539   BP: 100/69   Pulse: 99   Resp: 18   Temp: 98 °F (36.7 °C)   TempSrc: Oral   SpO2: 100%   Weight: 196 lb (88.9 kg)   Height: 5' 6\" (1.676 m)       Results for orders placed or performed in visit on 05/15/19   NUSWAB VAGINITIS PLUS   Result Value Ref Range    Atopobium vaginae Low - 0 Score    BVAB 2 Low - 0 Score    Megasphaera 1 Low - 0 Score    C. albicans, NOEL Positive (A) Negative    C. glabrata, NOEL Negative Negative    T. vaginalis, NOEL Negative Negative    C. trachomatis, NOEL Negative Negative    N. gonorrhoeae, NOEL Negative Negative   AMB POC URINE PREGNANCY TEST, VISUAL COLOR COMPARISON   Result Value Ref Range    VALID INTERNAL CONTROL POC Yes     HCG urine, Ql. (POC) Negative Negative   AMB POC URINALYSIS DIP STICK AUTO W/O MICRO   Result Value Ref Range    Color (UA POC) Yellow     Clarity (UA POC) Clear     Glucose (UA POC) Negative Negative    Bilirubin (UA POC) Negative Negative    Ketones (UA POC) Negative Negative    Specific gravity (UA POC) 1.020 1.001 - 1.035    Blood (UA POC) Negative Negative    pH (UA POC) 7.0 4.6 - 8.0    Protein (UA POC) Negative Negative    Urobilinogen (UA POC) 0.2 mg/dL 0.2 - 1    Nitrites (UA POC) Negative Negative    Leukocyte esterase (UA POC) Negative Negative   AMB POC HEMOGLOBIN A1C   Result Value Ref Range    Hemoglobin A1c (POC) 5.2 %         Physical Examination: General appearance - alert, well appearing, and in no distress  Chest - clear to auscultation, no wheezes, rales or rhonchi, symmetric air entry  Heart - normal rate, regular rhythm, normal S1, S2, no murmurs, rubs, clicks or gallops      Assessment/ Plan:       1. Anxiety and depression  She wishes to retry this, discussed that this is more for the anxiety and not depression. She was provided info for crisis line/911. She has upcoming psych appt    - busPIRone (BUSPAR) 7.5 mg tablet; Take 1 Tab by mouth three (3) times daily. Dispense: 90 Tab; Refill: 0    2. PTSD (post-traumatic stress disorder)          I have discussed the diagnosis with the patient and the intended plan as seen in the above orders. The patient has received an after-visit summary and questions were answered concerning future plans. Pt conveyed understanding of plan. Medication Side Effects and Warnings were discussed with patient: yes  Patient Labs were reviewed: yes  Patient Past Records were reviewed:  yes    Malachi Kevin.  Aba Chau NP

## 2020-01-07 ENCOUNTER — OFFICE VISIT (OUTPATIENT)
Dept: INTERNAL MEDICINE CLINIC | Age: 25
End: 2020-01-07

## 2020-01-07 VITALS
WEIGHT: 192.3 LBS | RESPIRATION RATE: 18 BRPM | BODY MASS INDEX: 30.91 KG/M2 | DIASTOLIC BLOOD PRESSURE: 71 MMHG | SYSTOLIC BLOOD PRESSURE: 101 MMHG | TEMPERATURE: 99 F | HEART RATE: 97 BPM | HEIGHT: 66 IN | OXYGEN SATURATION: 99 %

## 2020-01-07 DIAGNOSIS — R35.0 URINARY FREQUENCY: Primary | ICD-10-CM

## 2020-01-07 DIAGNOSIS — B37.2 YEAST DERMATITIS: ICD-10-CM

## 2020-01-07 DIAGNOSIS — N92.6 IRREGULAR MENSES: ICD-10-CM

## 2020-01-07 DIAGNOSIS — N76.0 ACUTE VAGINITIS: ICD-10-CM

## 2020-01-07 LAB
BILIRUB UR QL STRIP: NORMAL
GLUCOSE UR-MCNC: NEGATIVE MG/DL
HCG URINE, QL. (POC): NEGATIVE
KETONES P FAST UR STRIP-MCNC: NEGATIVE MG/DL
PH UR STRIP: 6 [PH] (ref 4.6–8)
PROT UR QL STRIP: NEGATIVE
SP GR UR STRIP: 1.02 (ref 1–1.03)
UA UROBILINOGEN AMB POC: NORMAL (ref 0.2–1)
URINALYSIS CLARITY POC: CLEAR
URINALYSIS COLOR POC: NORMAL
URINE BLOOD POC: NEGATIVE
URINE LEUKOCYTES POC: NEGATIVE
URINE NITRITES POC: NEGATIVE
VALID INTERNAL CONTROL?: YES

## 2020-01-07 RX ORDER — CLOTRIMAZOLE AND BETAMETHASONE DIPROPIONATE 10; .64 MG/G; MG/G
CREAM TOPICAL 2 TIMES DAILY
Qty: 30 G | Refills: 0 | Status: SHIPPED | OUTPATIENT
Start: 2020-01-07 | End: 2020-01-21

## 2020-01-07 NOTE — PATIENT INSTRUCTIONS
Safer Sex: Care Instructions  Your Care Instructions  Safer sex is a way to reduce your risk of getting an infection spread through sex. It can also help prevent pregnancy. Most infections that are spread through sex, also called sexually transmitted infections or STIs, can be cured. But some can decrease your chances of getting pregnant if they are not treated early. Others, such as herpes, have no cure. And some, such as HIV, can be deadly. Several products can help you practice safer sex and reduce your chance of STIs. One of the best is a condom. There are condoms for men and for women. The female condom is a tube of soft plastic with a closed end that is placed deep into the vagina. You can use a special rubber sheet (dental dam) for protection during oral sex. Disposable gloves can keep your hands from touching blood, semen, or other body fluids that can carry infections. Remember that birth control methods such as diaphragms, IUDs, foams, and birth control pills do not stop you from getting STIs. Follow-up care is a key part of your treatment and safety. Be sure to make and go to all appointments, and call your doctor if you are having problems. It's also a good idea to know your test results and keep a list of the medicines you take. How can you care for yourself at home? · Think about getting shots to prevent hepatitis A and hepatitis B. These two diseases can be spread through sex. You also can get hepatitis A if you eat infected food. · Use condoms or female condoms each time and every time you have sex. · Learn the right way to use a male condom:  ? Condoms come in several sizes. Make sure you use the right size. A condom that is too small can break easily. A condom that is too big can slip off during sex. Use a new condom each time you have sex. ? Be careful not to poke a hole in the condom when you open the wrapper. ? Squeeze the tip of the condom to keep out air.   ? Pull down the loose skin (foreskin) from the head of an uncircumcised penis. ? While squeezing the tip of the condom, unroll it all the way down to the base of the firm penis. ? Never use petroleum jelly (such as Vaseline), grease, hand lotion, baby oil, or anything with oil in it. These products can make holes in the condom. ? After sex, hold the condom on your penis as you remove your penis from your partner. This will keep semen from spilling out of the condom. · Learn to use a female condom:  ? You can put in a female condom up to 8 hours before sex. ? Squeeze the smaller ring at the closed end and insert it deep into the vagina. The larger ring at the open end should stay outside the vagina. ? During sex, make sure the penis goes into the condom. ? After the penis is removed, close the open end of the condom by twisting it. Remove the condom. · Do not use a female condom and male condom at the same time. · Do not have sex with anyone who has symptoms of an STI, such as sores on the genitals or mouth. The herpes virus that causes cold sores can spread to and from the penis and vagina. · Do not drink a lot of alcohol or use drugs before sex. This can cause you to let down your guard and not practice safer sex. · Having one sex partner (who does not have STIs and does not have sex with anyone else) is a sure way to avoid STIs. · Talk to your partner before you have sex. Find out if he or she has or is at risk for any STI. Keep in mind that a person may be able to spread an STI even if he or she does not have symptoms. You and your partner may want to get an HIV test. You should get tested again 6 months later. Where can you learn more? Go to http://salvatore-david.info/. Enter Z219 in the search box to learn more about \"Safer Sex: Care Instructions. \"  Current as of: September 11, 2018  Content Version: 12.2  © 8532-2555 [employer].  Care instructions adapted under license by [employer] (which disclaims liability or warranty for this information). If you have questions about a medical condition or this instruction, always ask your healthcare professional. [address] any warranty or liability for your use of this information.

## 2020-01-07 NOTE — PROGRESS NOTES
Pt here for   Chief Complaint   Patient presents with    Vaginal Discharge     Pt states she has yeast infection    Urinary Frequency    Painful Sex    Skin Problem     Pilling on each side of groin     1. Have you been to the ER, urgent care clinic since your last visit? Hospitalized since your last visit? No    2. Have you seen or consulted any other health care providers outside of the [address] [place] [address] since your last visit? Include any pap smears or colon screening.  No       Pt denies pain at this time    3 most recent PHQ Screens 1/7/2020   PHQ Not Done -   Little interest or pleasure in doing things Several days   Feeling down, depressed, irritable, or hopeless Several days   Total Score PHQ 2 2   Trouble falling or staying asleep, or sleeping too much -   Feeling tired or having little energy -   Poor appetite, weight loss, or overeating -   Feeling bad about yourself - or that you are a failure or have let yourself or your family down -   Trouble concentrating on things such as school, work, reading, or watching TV -   Moving or speaking so slowly that other people could have noticed; or the opposite being so fidgety that others notice -   Thoughts of being better off dead, or hurting yourself in some way -   PHQ 9 Score -   How difficult have these problems made it for you to do your work, take care of your home and get along with others -

## 2020-01-07 NOTE — PROGRESS NOTES
Subjective: (As above and below)     Chief Complaint   Patient presents with    Vaginal Discharge     Pt states she has yeast infection    Urinary Frequency    Painful Sex    Skin Problem     Pilling on each side of groin     Gina Mayen is a 25y.o. year old female who presents for vaginal symptoms    She reports white vaginal discharge for a few days as well as skin chafing to her groin. +itching. She has urinary frequency but no dysuria or other UTI s/s  She has had unprotected intercourse recently and took a pregnancy test that was \"inconclusive\"- cycles are irregular, suspect from chemo    She is followed by OBGYN, is on tamoxifen so contraception is limited. She is not interested in IUD    Reviewed PmHx, RxHx, FmHx, SocHx, AllgHx and updated in chart. Family History   Problem Relation Age of Onset    Hypertension Mother     Psychiatric Disorder Mother     Psychiatric Disorder Sister        Past Medical History:   Diagnosis Date    Anxiety     Cancer [facility]) 2019    RIGHT BREAST    Depression     PTSD (post-traumatic stress disorder)     Suicidal intent       Social History     Socioeconomic History    Marital status: SINGLE     Spouse name: Not on file    Number of children: Not on file    Years of education: Not on file    Highest education level: Not on file   Tobacco Use    Smoking status: Former Smoker     Types: Cigarettes    Smokeless tobacco: Former User     Quit date: 12/17/2019    Tobacco comment: black and mild every few days   Substance and Sexual Activity    Alcohol use: Yes     Comment: 2-3 times  weekly    Drug use: No    Sexual activity: Yes     Partners: Male   Social History Narrative    9/12/17: unemployed, 18 mo daughter          Current Outpatient Medications   Medication Sig    busPIRone (BUSPAR) 7.5 mg tablet Take 1 Tab by mouth three (3) times daily.  prochlorperazine (COMPAZINE) 10 mg tablet Take 5 mg by mouth every six (6) hours as needed.  Indications: Nausea and Vomiting caused by Cancer Drugs    ondansetron (ZOFRAN ODT) 4 mg disintegrating tablet Take 1 Tab by mouth every eight (8) hours as needed for Nausea.  lidocaine-prilocaine (EMLA) topical cream Apply  to affected area as needed for Pain.  tamoxifen (NOLVADEX) 10 mg tablet Take  by mouth daily. No current facility-administered medications for this visit. Review of Systems:   Constitutional:    Negative for fever and chills, negative diaphoresis. HEENT:              Negative for neck pain and stiffness. Eyes:                  Negative for visual disturbance, itching, redness or discharge. Respiratory:        Negative for cough and shortness of breath. Cardiovascular:  Negative for chest pain and palpitations. Gastrointestinal: Negative for nausea, vomiting, abdominal pain, diarrhea or constipation. Genitourinary:     Negative for dysuria and frequency. Musculoskeletal: Negative for falls, tenderness and swelling. Skin:                    rash  Neurological:       Negative for dizzyness, seizure, loss of consciousness, weakness and numbness.      Objective:     Vitals:    01/07/20 1422   BP: 101/71   Pulse: 97   Resp: 18   Temp: 99 °F (37.2 °C)   TempSrc: Oral   SpO2: 99%   Weight: 192 lb 4.8 oz (87.2 kg)   Height: 5' 6\" (1.676 m)     Results for orders placed or performed in visit on 01/07/20   AMB POC URINALYSIS DIP STICK AUTO W/O MICRO   Result Value Ref Range    Color (UA POC) Carmita     Clarity (UA POC) Clear     Glucose (UA POC) Negative Negative    Bilirubin (UA POC) 1+ Negative    Ketones (UA POC) Negative Negative    Specific gravity (UA POC) 1.025 1.001 - 1.035    Blood (UA POC) Negative Negative    pH (UA POC) 6.0 4.6 - 8.0    Protein (UA POC) Negative Negative    Urobilinogen (UA POC) 1 mg/dL 0.2 - 1    Nitrites (UA POC) Negative Negative    Leukocyte esterase (UA POC) Negative Negative   AMB POC URINE PREGNANCY TEST, VISUAL COLOR COMPARISON   Result Value Ref Range VALID INTERNAL CONTROL POC Yes     HCG urine, Ql. (POC) Negative Negative         Physical Examination: General appearance - alert, well appearing, and in no distress  Chest - clear to auscultation, no wheezes, rales or rhonchi, symmetric air entry  Heart - normal rate, regular rhythm, normal S1, S2, no murmurs, rubs, clicks or gallops  Skin - macerated red patchy rash to groin    Assessment/ Plan:       1. Urinary frequency    - AMB POC URINALYSIS DIP STICK AUTO W/O MICRO    2. Irregular menses  Faint wavy pink line on urine pregnancy test, second POC test negative. Will get serum test for double check  Discussed risks involved w/ pregnancy w/ current meds  - AMB POC URINE PREGNANCY TEST, VISUAL COLOR COMPARISON  - HCG QL SERUM    3. Acute vaginitis    - NUSWAB VAGINITIS PLUS    4. Yeast dermatitis  - clotrimazole-betamethasone (LOTRISONE) topical cream; Apply  to affected area two (2) times a day for 14 days. Dispense: 30 g; Refill: 0        I have discussed the diagnosis with the patient and the intended plan as seen in the above orders. The patient has received an after-visit summary and questions were answered concerning future plans. Pt conveyed understanding of plan. Medication Side Effects and Warnings were discussed with patient: yes  Patient Labs were reviewed: yes  Patient Past Records were reviewed:  yes    Reyes Rodriguez.  Janett Baker NP

## 2020-01-08 ENCOUNTER — TELEPHONE (OUTPATIENT)
Dept: INTERNAL MEDICINE CLINIC | Age: 25
End: 2020-01-08

## 2020-01-08 LAB — B-HCG SERPL QL: NEGATIVE MIU/ML

## 2020-01-08 NOTE — TELEPHONE ENCOUNTER
Pt states she had labs done and you would determine if pill for yeast infection was needed.   Pt # [phone]

## 2020-01-09 RX ORDER — FLUCONAZOLE 150 MG/1
150 TABLET ORAL DAILY
Qty: 1 TAB | Refills: 0 | Status: SHIPPED | OUTPATIENT
Start: 2020-01-09 | End: 2020-01-10

## 2020-01-09 NOTE — TELEPHONE ENCOUNTER
01/09/2020 Called patient @ [phone] spoke to patient , notified patient that medications was sent to the pharmacy and that nu swab has not been resulted yet, Pt understood instructions Alberto Dc LPN

## 2020-10-07 RX ORDER — FLUCONAZOLE 150 MG/1
150 TABLET ORAL DAILY
Qty: 1 TAB | Refills: 0 | Status: SHIPPED | OUTPATIENT
Start: 2020-10-07 | End: 2020-10-08

## 2021-01-06 ENCOUNTER — OFFICE VISIT (OUTPATIENT)
Dept: INTERNAL MEDICINE CLINIC | Age: 26
End: 2021-01-06
Payer: MEDICAID

## 2021-01-06 VITALS
RESPIRATION RATE: 18 BRPM | WEIGHT: 219 LBS | HEIGHT: 66 IN | BODY MASS INDEX: 35.2 KG/M2 | HEART RATE: 87 BPM | DIASTOLIC BLOOD PRESSURE: 86 MMHG | OXYGEN SATURATION: 97 % | SYSTOLIC BLOOD PRESSURE: 114 MMHG | TEMPERATURE: 98.1 F

## 2021-01-06 DIAGNOSIS — N76.0 ACUTE VAGINITIS: Primary | ICD-10-CM

## 2021-01-06 DIAGNOSIS — R10.12 LUQ PAIN: ICD-10-CM

## 2021-01-06 LAB
BILIRUB UR QL STRIP: NORMAL
GLUCOSE UR-MCNC: NEGATIVE MG/DL
KETONES P FAST UR STRIP-MCNC: NEGATIVE MG/DL
PH UR STRIP: 5.5 [PH] (ref 4.6–8)
PROT UR QL STRIP: NEGATIVE
SP GR UR STRIP: 1.02 (ref 1–1.03)
UA UROBILINOGEN AMB POC: NORMAL (ref 0.2–1)
URINALYSIS CLARITY POC: NORMAL
URINALYSIS COLOR POC: YELLOW
URINE BLOOD POC: NEGATIVE
URINE LEUKOCYTES POC: NEGATIVE
URINE NITRITES POC: NEGATIVE

## 2021-01-06 PROCEDURE — 81003 URINALYSIS AUTO W/O SCOPE: CPT | Performed by: NURSE PRACTITIONER

## 2021-01-06 PROCEDURE — 99213 OFFICE O/P EST LOW 20 MIN: CPT | Performed by: NURSE PRACTITIONER

## 2021-01-06 RX ORDER — OMEPRAZOLE 40 MG/1
40 CAPSULE, DELAYED RELEASE ORAL DAILY
Qty: 14 CAP | Refills: 0 | Status: SHIPPED | OUTPATIENT
Start: 2021-01-06 | End: 2021-01-20

## 2021-01-06 NOTE — PROGRESS NOTES
Chief Complaint   Patient presents with    Labs     STD screen     Ultrasound     pt is requesting ultrasound for pain on left side, pt believes she may have an ovarian cyst        1. Have you been to the ER, urgent care clinic since your last visit? Hospitalized since your last visit? No    2. Have you seen or consulted any other health care providers outside of the [address] [place] [address] since your last visit? Include any pap smears or colon screening.  No

## 2021-01-06 NOTE — PROGRESS NOTES
Subjective: (As above and below)     Chief Complaint   Patient presents with   [facility]     STD screen     Ultrasound     pt is requesting ultrasound for pain on left side, pt believes she may have an ovarian cyst      Edelmira Bhakta is a 22y.o. year old female who presents for     LUQ pain x few weeks, she has occ nausea, no vomiting, no diarrhea, constipation, black or tarry stools  Pain is sharp. Is not associated w/ eating. She does report getting acid reflex sensation at times  She is not on regular nsaids   She questions if this is r/t her ovarian cysts- known R cyst and was to have fu w/ OBGYN which has been delayed d/t pandemic  She denies L pelvic pain  She has 2 cycles per mo  She denies UTI s/s      She requests STI testing as she had intercourse and felt some irritation - scant discharge like yeast. No itching    Breast CA: followed by oncology/breast center. Reviewed PmHx, RxHx, FmHx, SocHx, AllgHx and updated in chart.   Family History   Problem Relation Age of Onset    Hypertension Mother     Psychiatric Disorder Mother     Psychiatric Disorder Sister        Past Medical History:   Diagnosis Date    Anxiety     Cancer [facility]) 2019    RIGHT BREAST    Depression     PTSD (post-traumatic stress disorder)     Suicidal intent       Social History     Socioeconomic History    Marital status: SINGLE     Spouse name: Not on file    Number of children: Not on file    Years of education: Not on file    Highest education level: Not on file   Tobacco Use    Smoking status: Former Smoker     Types: Cigarettes    Smokeless tobacco: Former User     Quit date: 12/17/2019    Tobacco comment: black and mild every few days   Substance and Sexual Activity    Alcohol use: Yes     Comment: 2-3 times  weekly    Drug use: No    Sexual activity: Yes     Partners: Male   Social History Narrative    9/12/17: unemployed, 18 mo daughter          Current Outpatient Medications   Medication Sig    omeprazole (PRILOSEC) 40 mg capsule Take 1 Cap by mouth daily for 14 days.  tamoxifen (NOLVADEX) 10 mg tablet Take  by mouth daily.  busPIRone (BUSPAR) 7.5 mg tablet Take 1 Tab by mouth three (3) times daily.  prochlorperazine (COMPAZINE) 10 mg tablet Take 5 mg by mouth every six (6) hours as needed. Indications: Nausea and Vomiting caused by Cancer Drugs    ondansetron (ZOFRAN ODT) 4 mg disintegrating tablet Take 1 Tab by mouth every eight (8) hours as needed for Nausea.  lidocaine-prilocaine (EMLA) topical cream Apply  to affected area as needed for Pain. No current facility-administered medications for this visit. Review of Systems:   Constitutional:    Negative for fever and chills, negative diaphoresis. HEENT:              Negative for neck pain and stiffness. Eyes:                  Negative for visual disturbance, itching, redness or discharge. Respiratory:        Negative for cough and shortness of breath. Cardiovascular:  Negative for chest pain and palpitations. Gastrointestinal: Negative for nausea, vomiting, abdominal pain, diarrhea or constipation. Genitourinary:     Negative for dysuria and frequency. Musculoskeletal: Negative for falls, tenderness and swelling. Skin:                    Negative for rash, masses or lesions. Neurological:       Negative for dizzyness, seizure, loss of consciousness, weakness and numbness.      Objective:     Vitals:    01/06/21 1040   BP: 114/86   Pulse: 87   Resp: 18   Temp: 98.1 °F (36.7 °C)   TempSrc: Temporal   SpO2: 97%   Weight: 219 lb (99.3 kg)   Height: 5' 6\" (1.676 m)       Results for orders placed or performed in visit on 01/06/21   AMB POC URINALYSIS DIP STICK AUTO W/O MICRO   Result Value Ref Range    Color (UA POC) Yellow     Clarity (UA POC) Slightly Cloudy     Glucose (UA POC) Negative Negative    Bilirubin (UA POC) 1+ Negative    Ketones (UA POC) Negative Negative    Specific gravity (UA POC) 1.025 1.001 - 1.035    Blood (UA POC) Negative Negative    pH (UA POC) 5.5 4.6 - 8.0    Protein (UA POC) Negative Negative    Urobilinogen (UA POC) 0.2 mg/dL 0.2 - 1    Nitrites (UA POC) Negative Negative    Leukocyte esterase (UA POC) Negative Negative         Physical Examination: General appearance - alert, well appearing, and in no distress  Mental status - alert, oriented to person, place, and time, normal mood, behavior, speech, dress, motor activity, and thought processes  Chest - clear to auscultation, no wheezes, rales or rhonchi, symmetric air entry  Heart - normal rate, regular rhythm, normal S1, S2, no murmurs, rubs, clicks or gallops  Abdomen - abd soft, bowel sounds nl, she has pain to LUQ w/ palp  Skin - normal coloration and turgor, no rashes, no suspicious skin lesions noted      Assessment/ Plan:        Fu w/ obgyn  ED if acute worsening abd pain    1. Acute vaginitis    - NUSWAB VAGINITIS PLUS  - AMB POC URINALYSIS DIP STICK AUTO W/O MICRO    2. LUQ pain    - US ABD COMP; Future  - AMB POC URINALYSIS DIP STICK AUTO W/O MICRO  - omeprazole (PRILOSEC) 40 mg capsule; Take 1 Cap by mouth daily for 14 days. Dispense: 14 Cap; Refill: 0      I have discussed the diagnosis with the patient and the intended plan as seen in the above orders. The patient has received an after-visit summary and questions were answered concerning future plans. Pt conveyed understanding of plan. Medication Side Effects and Warnings were discussed with patient: yes  Patient Labs were reviewed: yes  Patient Past Records were reviewed:  yes    Jody Appiah.  Estella Pac, NP

## 2021-01-06 NOTE — PATIENT INSTRUCTIONS

## 2021-01-09 LAB
A VAGINAE DNA VAG QL NAA+PROBE: ABNORMAL SCORE
BVAB2 DNA VAG QL NAA+PROBE: ABNORMAL SCORE
C ALBICANS DNA VAG QL NAA+PROBE: NEGATIVE
C GLABRATA DNA VAG QL NAA+PROBE: NEGATIVE
C TRACH DNA VAG QL NAA+PROBE: NEGATIVE
MEGA1 DNA VAG QL NAA+PROBE: ABNORMAL SCORE
N GONORRHOEA DNA VAG QL NAA+PROBE: NEGATIVE
T VAGINALIS DNA VAG QL NAA+PROBE: NEGATIVE

## 2021-01-11 RX ORDER — METRONIDAZOLE 7.5 MG/G
1 GEL VAGINAL
Qty: 25 G | Refills: 0 | Status: SHIPPED | OUTPATIENT
Start: 2021-01-11 | End: 2021-01-16

## 2021-01-27 ENCOUNTER — PATIENT MESSAGE (OUTPATIENT)
Dept: INTERNAL MEDICINE CLINIC | Age: 26
End: 2021-01-27

## 2021-01-27 DIAGNOSIS — N83.201 RIGHT OVARIAN CYST: ICD-10-CM

## 2021-03-05 ENCOUNTER — TELEPHONE (OUTPATIENT)
Dept: INTERNAL MEDICINE CLINIC | Age: 26
End: 2021-03-05

## 2021-05-06 RX ORDER — FLUCONAZOLE 150 MG/1
150 TABLET ORAL DAILY
Qty: 1 TAB | Refills: 0 | Status: SHIPPED | OUTPATIENT
Start: 2021-05-06 | End: 2021-05-07

## 2021-08-16 NOTE — MR AVS SNAPSHOT
[address] [facility] [address] [address] 
[phone] Patient: Shadi Do MRN: [id number] :1995 Visit Information Date & Time Provider Department Dept. Phone Encounter #  
 2018 11:40 AM Charleen Garciasalo, [address] [id number] Follow-up Instructions Return if symptoms worsen or fail to improve. Upcoming Health Maintenance Date Due  
 HPV Age 9Y-34Y (1 of 1 - Female 3 Dose Series) 8/10/2006 Pneumococcal 19-64 Medium Risk (1 of 1 - PPSV23) 8/10/2014 DTaP/Tdap/Td series (1 - Tdap) 8/10/2016 Influenza Age 5 to Adult 2018 PAP AKA CERVICAL CYTOLOGY 2019 Allergies as of 2018  Review Complete On: 2018 By: Nathalia Fernandes LPN No Known Allergies Current Immunizations  Never Reviewed No immunizations on file. Not reviewed this visit You Were Diagnosed With   
  
 Codes Comments Flank pain    -  Primary ICD-10-CM: R10.9 ICD-9-CM: 789.09 Vitals BP Pulse Temp Resp Height(growth percentile) Weight(growth percentile) 101/75 (BP 1 Location: Left arm, BP Patient Position: Sitting) (!) 103 97.5 °F (36.4 °C) (Oral) 18 5' 6\" (1.676 m) 208 lb 1.6 oz (94.4 kg) SpO2 BMI OB Status Smoking Status 96% 33.59 kg/m2 Injection Current Some Day Smoker BMI and BSA Data Body Mass Index Body Surface Area  
 33.59 kg/m 2 2.1 m 2 [facility]erred Pharmacy [phone] Your Updated Medication List  
  
   
This list is accurate as of 18 12:07 PM.  Always use your most recent med list.  
  
  
  
  
 fluticasone 50 mcg/actuation nasal spray Commonly known as:  Marsh Fails 2 Sprays by Both Nostrils route daily as needed for Rhinitis. medroxyPROGESTERone 150 mg/mL Syrg Commonly known as:  DEPO-PROVERA 150 mg by IntraMUSCular route once. NUVARING 0.12-0.015 mg/24 hr vaginal ring Generic drug:  ethinyl estradiol-etonogestrel  
by Intravaginal route. We Performed the Following AMB POC URINALYSIS DIP STICK AUTO W/O MICRO [61823 CPT(R)] Follow-up Instructions Return if symptoms worsen or fail to improve. To-Do List   
 07/12/2018 Imaging:  CT ABD PELV WO CONT Referral Information Referral ID Referred By Referred To  
  
 [id number] Emma Plummer CARMELO Not Available Visits Status Start Date End Date 1 New Request 7/12/18 7/12/19 If your referral has a status of pending review or denied, additional information will be sent to support the outcome of this decision. Patient Instructions Flank Pain: Care Instructions Your Care Instructions Flank pain is pain on the side of the back just below the rib cage and above the waist. It can be on one or both sides. Flank pain has many possible causes, including a kidney stone, a urinary tract infection, or back strain. Flank pain may get better on its own. But don't ignore new symptoms, such as fever, nausea and vomiting, urination problems, pain that gets worse, and dizziness. These may be signs of a more serious problem. You may have to have tests or other treatment. Follow-up care is a key part of your treatment and safety. Be sure to make and go to all appointments, and call your doctor if you are having problems. It's also a good idea to know your test results and keep a list of the medicines you take. How can you care for yourself at home? · Rest until you feel better. · Take pain medicines exactly as directed. ¨ If the doctor gave you a prescription medicine for pain, take it as prescribed.  
¨ If you are not taking a prescription pain medicine, ask your doctor if you can take an over-the-counter pain medicine, such as acetaminophen (Tylenol), ibuprofen (Advil, Motrin), or naproxen (Aleve). Read and follow all instructions on the label. · If your doctor prescribed antibiotics, take them as directed. Do not stop taking them just because you feel better. You need to take the full course of antibiotics. · To apply heat, put a warm water bottle, a heating pad set on low, or a warm cloth on the painful area. Do not go to sleep with a heating pad on your skin. · To prevent dehydration, drink plenty of fluids, enough so that your urine is light yellow or clear like water. Choose water and other caffeine-free clear liquids until you feel better. If you have kidney, heart, or liver disease and have to limit fluids, talk with your doctor before you increase the amount of fluids you drink. When should you call for help? Call your doctor now or seek immediate medical care if: 
  · Your flank pain gets worse.  
  · You have new symptoms, such as fever, nausea, or vomiting.  
  · You have symptoms of a urinary problem. For example: ¨ You have blood or pus in your urine. ¨ You have chills or body aches. ¨ It hurts to urinate. ¨ You have groin or belly pain.  
 Watch closely for changes in your health, and be sure to contact your doctor if you do not get better as expected. Where can you learn more? Go to http://salvatore-david.info/. Enter S191 in the search box to learn more about \"Flank Pain: Care Instructions. \" Current as of: November 20, 2017 Content Version: 11.7 © 0296-2045 [employer]. Care instructions adapted under license by [employer] (which disclaims liability or warranty for this information). If you have questions about a medical condition or this instruction, always ask your healthcare professional. [place] [address] any warranty or liability for your use of this information. Introducing \[facility]\"" & HEALTH SERVICES! [employer] introduces [employer] patient portal. Now you can access parts of your medical record, email your doctor's office, and request medication refills online. 1. In your internet browser, go to [email or web address] 2. Click on the First Time User? Click Here link in the Sign In box. You will see the New Member Sign Up page. 3. Enter your [employer] Access Code exactly as it appears below. You will not need to use this code after youve completed the sign-up process. If you do not sign up before the expiration date, you must request a new code. · [employer] Access Code: [email or web address] Expires: 10/10/2018 11:46 AM 
 
4. Enter the last four digits of your Social Security Number (xxxx) and Date of Birth (mm/dd/yyyy) as indicated and click Submit. You will be taken to the next sign-up page. 5. Create a [employer] ID. This will be your [employer] login ID and cannot be changed, so think of one that is secure and easy to remember. 6. Create a [employer] password. You can change your password at any time. 7. Enter your Password Reset Question and Answer. This can be used at a later time if you forget your password. 8. Enter your e-mail address. You will receive e-mail notification when new information is available in [address] 9. Click Sign Up. You can now view and download portions of your medical record. 10. Click the Download Summary menu link to download a portable copy of your medical information. If you have questions, please visit the Frequently Asked Questions section of the [employer] website. Remember, [employer] is NOT to be used for urgent needs. For medical emergencies, dial 911. Now available from your iPhone and Android! Please provide this summary of care documentation to your next provider. Your primary care clinician is listed as Manjinder Correa. If you have any questions after today's visit, please call [phone]. done

## 2021-12-02 ENCOUNTER — OFFICE VISIT (OUTPATIENT)
Dept: INTERNAL MEDICINE CLINIC | Age: 26
End: 2021-12-02
Payer: MEDICAID

## 2021-12-02 VITALS
HEIGHT: 66 IN | SYSTOLIC BLOOD PRESSURE: 115 MMHG | WEIGHT: 232 LBS | DIASTOLIC BLOOD PRESSURE: 77 MMHG | RESPIRATION RATE: 16 BRPM | OXYGEN SATURATION: 98 % | TEMPERATURE: 98.1 F | BODY MASS INDEX: 37.28 KG/M2 | HEART RATE: 118 BPM

## 2021-12-02 DIAGNOSIS — R30.0 DYSURIA: ICD-10-CM

## 2021-12-02 DIAGNOSIS — Z13.1 DIABETES MELLITUS SCREENING: ICD-10-CM

## 2021-12-02 DIAGNOSIS — C50.911 MALIGNANT NEOPLASM OF RIGHT FEMALE BREAST, UNSPECIFIED ESTROGEN RECEPTOR STATUS, UNSPECIFIED SITE OF BREAST (HCC): ICD-10-CM

## 2021-12-02 DIAGNOSIS — N76.0 ACUTE VAGINITIS: Primary | ICD-10-CM

## 2021-12-02 DIAGNOSIS — R20.2 FACIAL TINGLING SENSATION: ICD-10-CM

## 2021-12-02 PROBLEM — Z90.13 ACQUIRED ABSENCE OF BILATERAL BREASTS AND NIPPLES: Status: ACTIVE | Noted: 2019-11-04

## 2021-12-02 LAB
BILIRUB UR QL STRIP: NEGATIVE
GLUCOSE UR-MCNC: NEGATIVE MG/DL
KETONES P FAST UR STRIP-MCNC: NEGATIVE MG/DL
PH UR STRIP: 6.5 [PH] (ref 4.6–8)
PROT UR QL STRIP: NORMAL
SP GR UR STRIP: 1.02 (ref 1–1.03)
UA UROBILINOGEN AMB POC: NORMAL (ref 0.2–1)
URINALYSIS CLARITY POC: CLEAR
URINALYSIS COLOR POC: NORMAL
URINE BLOOD POC: NEGATIVE
URINE LEUKOCYTES POC: NEGATIVE
URINE NITRITES POC: NEGATIVE

## 2021-12-02 PROCEDURE — 99214 OFFICE O/P EST MOD 30 MIN: CPT | Performed by: NURSE PRACTITIONER

## 2021-12-02 PROCEDURE — 81003 URINALYSIS AUTO W/O SCOPE: CPT | Performed by: NURSE PRACTITIONER

## 2021-12-02 RX ORDER — SERTRALINE HYDROCHLORIDE 50 MG/1
50 TABLET, FILM COATED ORAL DAILY
Qty: 30 TABLET | Refills: 0 | Status: SHIPPED | OUTPATIENT
Start: 2021-12-02 | End: 2022-01-11 | Stop reason: SDUPTHER

## 2021-12-02 RX ORDER — SERTRALINE HYDROCHLORIDE 50 MG/1
50 TABLET, FILM COATED ORAL DAILY
COMMUNITY
Start: 2020-06-26 | End: 2021-12-02

## 2021-12-02 RX ORDER — ASPIRIN 81 MG/1
81 TABLET ORAL DAILY
Qty: 90 TABLET | Refills: 0 | Status: SHIPPED | OUTPATIENT
Start: 2021-12-02

## 2021-12-02 NOTE — PROGRESS NOTES
Chief Complaint   Patient presents with    Vaginal Infection     pt reports stomach pain, burning, spotting and the feeling of having to make BM, pt also reports \"hot smell\" - previously had BV,     Tingling     pt reports right side face tingling all week and 1 month ago she experiened slurred speech        1. Have you been to the ER, urgent care clinic since your last visit? Hospitalized since your last visit? Yes When: 11/2021 Where: [facility] Reason for visit: left breast draining    2. Have you seen or consulted any other health care providers outside of the [address] [place] [address] since your last visit? Include any pap smears or colon screening.  No

## 2021-12-02 NOTE — PROGRESS NOTES
Subjective: (As above and below)     Chief Complaint   Patient presents with    Vaginal Infection     pt reports stomach pain, burning, spotting and the feeling of having to make BM, pt also reports \"hot smell\" - previously had BV,     Tingling     pt reports right side face tingling all week and 1 month ago she experiened slurred speech      Wilfred Domingo is a 32y.o. year old female who presents for     Vaginitis: vaginal odor, dysuria, bladder pressure      Breast CA: follow w/ oncology    Facial tingling: x 1 week, she states that the R side of her face   No facial drooping  She did have an episode of slurred speech approx 1 mo ago lasted a few minutes  She did not go to the ED  No extremity weakness        Reviewed PmHx, RxHx, FmHx, SocHx, AllgHx and updated in chart. Family History   Problem Relation Age of Onset    Hypertension Mother     Psychiatric Disorder Mother     Psychiatric Disorder Sister        Past Medical History:   Diagnosis Date    Anxiety     Cancer [facility]) 2019    RIGHT BREAST    Depression     PTSD (post-traumatic stress disorder)     Suicidal intent       Social History     Socioeconomic History    Marital status: SINGLE   Tobacco Use    Smoking status: Former Smoker     Types: Cigarettes    Smokeless tobacco: Former User     Quit date: 12/17/2019    Tobacco comment: black and mild every few days   Substance and Sexual Activity    Alcohol use: Yes     Comment: 2-3 times  weekly    Drug use: No    Sexual activity: Yes     Partners: Male   Social History Narrative    9/12/17: unemployed, 18 mo daughter          Current Outpatient Medications   Medication Sig    sertraline (ZOLOFT) 50 mg tablet Take 50 mg by mouth daily.  tamoxifen (NOLVADEX) 10 mg tablet Take  by mouth daily.  busPIRone (BUSPAR) 7.5 mg tablet Take 1 Tab by mouth three (3) times daily.  prochlorperazine (COMPAZINE) 10 mg tablet Take 5 mg by mouth every six (6) hours as needed.  Indications: Nausea and Vomiting caused by Cancer Drugs    ondansetron (ZOFRAN ODT) 4 mg disintegrating tablet Take 1 Tab by mouth every eight (8) hours as needed for Nausea.  lidocaine-prilocaine (EMLA) topical cream Apply  to affected area as needed for Pain. No current facility-administered medications for this visit. Review of Systems:   Constitutional:    Negative for fever and chills, negative diaphoresis. HEENT:              Negative for neck pain and stiffness. Eyes:                  Negative for visual disturbance, itching, redness or discharge. Respiratory:        Negative for cough and shortness of breath. Cardiovascular:  Negative for chest pain and palpitations. Gastrointestinal: Negative for nausea, vomiting, abdominal pain, diarrhea or constipation. Genitourinary:     Negative for dysuria and frequency. Musculoskeletal: Negative for falls, tenderness and swelling. Skin:                    Negative for rash, masses or lesions. Neurological:       Negative for dizzyness, seizure, loss of consciousness, weakness and numbness. Objective:     Vitals:    12/02/21 1119   BP: 115/77   Pulse: (!) 118   Resp: 16   Temp: 98.1 °F (36.7 °C)   TempSrc: Temporal   SpO2: 98%   Weight: 232 lb (105.2 kg)   Height: 5' 6\" (1.676 m)     Results for orders placed or performed in visit on 12/02/21   AMB POC URINALYSIS DIP STICK AUTO W/O MICRO   Result Value Ref Range    Color (UA POC) Dark Yellow     Clarity (UA POC) Clear     Glucose (UA POC) Negative Negative    Bilirubin (UA POC) Negative Negative    Ketones (UA POC) Negative Negative    Specific gravity (UA POC) 1.025 1.001 - 1.035    Blood (UA POC) Negative Negative    pH (UA POC) 6.5 4.6 - 8.0    Protein (UA POC) 1+ Negative    Urobilinogen (UA POC) 1 mg/dL 0.2 - 1    Nitrites (UA POC) Negative Negative    Leukocyte esterase (UA POC) Negative Negative       Gen: Oriented to person, place and time and well-developed, well-nourished and in no distress.    HEENT: Head: normocephalic and atraumatic. Eyes:  EOM are normal. Pupils equal and round. Neck:  Normal range of motion. Neck supple. Cardiovascular: normal rate, regular rhythm and normal heart sounds. Pulmonary/Chest:  Effort normal and breath sounds normal.  No respiratory distress. No wheezes, no rales. Abdominal: soft, normal  bowel sounds. Musculoskeletal:  No edema, no tenderness. No calf tenderness or edema. Neurological:  Alert, oriented to person, place and time. Skin: skin is warm and dry. Assessment/ Plan:       1. Acute vaginitis    - NUSWAB VAGINITIS PLUS    2. Malignant neoplasm of right female breast, unspecified estrogen receptor status, unspecified site of breast ([email or web address]    - METABOLIC PANEL, COMPREHENSIVE; Future  - CBC W/O DIFF; Future  - LIPID PANEL; Future    3. Facial tingling sensation    - REFERRAL TO NEUROLOGY  - METABOLIC PANEL, COMPREHENSIVE; Future  - CBC W/O DIFF; Future  - LIPID PANEL; Future  - aspirin delayed-release 81 mg tablet; Take 1 Tablet by mouth daily. Dispense: 90 Tablet; Refill: 0  - THYROID CASCADE PROFILE; Future    4. Diabetes mellitus screening    - HEMOGLOBIN A1C WITH EAG; Future    5. Dysuria    - AMB POC URINALYSIS DIP STICK AUTO W/O MICRO        I have discussed the diagnosis with the patient and the intended plan as seen in the above orders. The patient has received an after-visit summary and questions were answered concerning future plans. Pt conveyed understanding of plan. Medication Side Effects and Warnings were discussed with patient: yes  Patient Labs were reviewed: yes  Patient Past Records were reviewed:  yes    Steven Palafox.  Rohan Bangura NP

## 2021-12-06 LAB
A VAGINAE DNA VAG QL NAA+PROBE: NORMAL SCORE
BVAB2 DNA VAG QL NAA+PROBE: NORMAL SCORE
C ALBICANS DNA VAG QL NAA+PROBE: NEGATIVE
C GLABRATA DNA VAG QL NAA+PROBE: NEGATIVE
C TRACH DNA VAG QL NAA+PROBE: NEGATIVE
MEGA1 DNA VAG QL NAA+PROBE: NORMAL SCORE
N GONORRHOEA DNA VAG QL NAA+PROBE: NEGATIVE
T VAGINALIS DNA VAG QL NAA+PROBE: NEGATIVE

## 2022-01-11 RX ORDER — SERTRALINE HYDROCHLORIDE 50 MG/1
50 TABLET, FILM COATED ORAL DAILY
Qty: 30 TABLET | Refills: 2 | Status: SHIPPED | OUTPATIENT
Start: 2022-01-11

## 2022-03-19 PROBLEM — E66.01 SEVERE OBESITY (HCC): Status: ACTIVE | Noted: 2019-03-12

## 2022-03-19 PROBLEM — Z90.13 ACQUIRED ABSENCE OF BILATERAL BREASTS AND NIPPLES: Status: ACTIVE | Noted: 2019-11-04

## 2022-03-19 PROBLEM — Z31.62 ENCOUNTER FOR FERTILITY PRESERVATION COUNSELING PRIOR TO CANCER THERAPY: Status: ACTIVE | Noted: 2019-04-12

## 2022-03-19 PROBLEM — N83.201 RIGHT OVARIAN CYST: Status: ACTIVE | Noted: 2021-01-27

## 2022-03-19 PROBLEM — C50.919 BREAST CARCINOMA (HCC): Status: ACTIVE | Noted: 2019-03-30

## 2022-03-19 PROBLEM — E66.01 SEVERE OBESITY: Status: ACTIVE | Noted: 2019-03-12

## 2022-04-11 PROBLEM — R45.851 SUICIDAL IDEATION: Status: ACTIVE | Noted: 2022-04-11

## 2022-05-24 ENCOUNTER — VIRTUAL VISIT (OUTPATIENT)
Dept: INTERNAL MEDICINE CLINIC | Age: 27
End: 2022-05-24

## 2022-05-24 RX ORDER — ESCITALOPRAM OXALATE 10 MG/1
TABLET ORAL
COMMUNITY

## 2022-05-24 NOTE — PROGRESS NOTES
Pt is here for   Chief Complaint   Patient presents with    Pregnancy     pt states that she was told that she's high risk      1. Have you been to the ER, urgent care clinic since your last visit? Hospitalized since your last visit? No    2. Have you seen or consulted any other health care providers outside of the [address] [place] [address] since your last visit? Include any pap smears or colon screening.  No

## 2022-05-24 NOTE — PROGRESS NOTES
A user error has taken place: encounter opened in error, closed for administrative reasons.     Sent links and called no answer

## 2023-04-17 ENCOUNTER — OFFICE VISIT (OUTPATIENT)
Dept: INTERNAL MEDICINE CLINIC | Age: 28
End: 2023-04-17

## 2023-04-17 VITALS
WEIGHT: 219 LBS | TEMPERATURE: 98 F | BODY MASS INDEX: 34.37 KG/M2 | DIASTOLIC BLOOD PRESSURE: 67 MMHG | SYSTOLIC BLOOD PRESSURE: 102 MMHG | HEART RATE: 98 BPM | RESPIRATION RATE: 18 BRPM | OXYGEN SATURATION: 98 % | HEIGHT: 67 IN

## 2023-04-17 DIAGNOSIS — F33.1 MODERATE EPISODE OF RECURRENT MAJOR DEPRESSIVE DISORDER (HCC): Primary | ICD-10-CM

## 2023-04-17 PROCEDURE — 99213 OFFICE O/P EST LOW 20 MIN: CPT | Performed by: NURSE PRACTITIONER

## 2023-04-17 RX ORDER — ESCITALOPRAM OXALATE 10 MG/1
10 TABLET ORAL DAILY
Qty: 30 TABLET | Refills: 1 | Status: SHIPPED | OUTPATIENT
Start: 2023-04-17

## 2023-04-17 NOTE — PROGRESS NOTES
Subjective: (As above and below)     Chief Complaint   Patient presents with    Depression     Sunni Delgadillo is a 32y.o. year old female who presents for depression    Was on lexpro 20mg thru OBGYN- last seen there 12/16/22, she felt the increase caused worsening nightmares,   Worked fairly well at 10mg  She was also on sertraline in the past as well but felt lexapro worked better  She feels easily irritated, angry, nightmares  She is going to try to re-est care w/ there therapist on Saturday  Denies thoughts of dto has had thoughts of hurting her children's father but passive, no plan  Past dx of PTSD  Her son whom she delivered in November was in ICU, has a g-tube- (mecomium aspiration)- doing much better but this has been stressful  She has help from family  She stopped lexapro cold turkey 2 weeks ago      Wt Readings from Last 3 Encounters:   04/17/23 219 lb (99.3 kg)   12/02/21 232 lb (105.2 kg)   01/06/21 219 lb (99.3 kg)     Hx of R breast ca: she has not followed w breast center, overdue for appt      Reviewed PmHx, RxHx, FmHx, SocHx, AllgHx and updated in chart.   Family History   Problem Relation Age of Onset    Hypertension Mother     Psychiatric Disorder Mother     Psychiatric Disorder Sister        Past Medical History:   Diagnosis Date    Anxiety     Cancer ([facility] [address] 2019    RIGHT BREAST    Depression     PTSD (post-traumatic stress disorder)     Suicidal intent       Social History     Socioeconomic History    Marital status: SINGLE   Tobacco Use    Smoking status: Former     Types: Cigarettes    Smokeless tobacco: Former     Quit date: 12/17/2019    Tobacco comments:     black and mild every few days   Substance and Sexual Activity    Alcohol use: Yes     Comment: 2-3 times  weekly    Drug use: No    Sexual activity: Yes     Partners: Male   Social History Narrative    9/12/17: unemployed, 18 mo daughter          Current Outpatient Medications   Medication Sig    escitalopram oxalate (LEXAPRO) 10 mg tablet Lexapro 10 mg tablet   Take 1 tablet every day by oral route. (Patient not taking: Reported on 4/17/2023)    sertraline (ZOLOFT) 50 mg tablet Take 1 Tablet by mouth daily. (Patient not taking: Reported on 5/24/2022)    tamoxifen (NOLVADEX) 10 mg tablet Take  by mouth daily. (Patient not taking: Reported on 5/24/2022)    busPIRone (BUSPAR) 7.5 mg tablet Take 1 Tab by mouth three (3) times daily. (Patient not taking: Reported on 5/24/2022)     No current facility-administered medications for this visit. Review of Systems:   Constitutional:    Negative for fever and chills, negative diaphoresis. HEENT:              Negative for neck pain and stiffness. Eyes:                  Negative for visual disturbance, itching, redness or discharge. Respiratory:        Negative for cough and shortness of breath. Cardiovascular:  Negative for chest pain and palpitations. Gastrointestinal: Negative for nausea, vomiting, abdominal pain, diarrhea or constipation. Genitourinary:     Negative for dysuria and frequency. Musculoskeletal: Negative for falls, tenderness and swelling. Skin:                    Negative for rash, masses or lesions. Neurological:       Negative for dizzyness, seizure, loss of consciousness, weakness and numbness.      Objective:     Vitals:    04/17/23 1607   BP: 102/67   Pulse: 98   Resp: 18   Temp: 98 °F (36.7 °C)   TempSrc: Temporal   SpO2: 98%   Weight: 219 lb (99.3 kg)   Height: 5' 6.5\" (1.689 m)       Results for orders placed or performed in visit on 12/02/21   THYROID CASCADE PROFILE   Result Value Ref Range    TSH 2.370 0.450 - 4.500 uIU/mL   HEMOGLOBIN A1C WITH EAG   Result Value Ref Range    Hemoglobin A1c 4.9 4.0 - 5.6 %    Est. average glucose 94 mg/dL   LIPID PANEL   Result Value Ref Range    Cholesterol, total 141 <200 MG/DL    Triglyceride 78 <150 MG/DL    HDL Cholesterol 76 MG/DL    LDL, calculated 49.4 0 - 100 MG/DL    VLDL, calculated 15.6 MG/DL    CHOL/HDL Ratio 1.9 0.0 - 5.0     CBC W/O DIFF   Result Value Ref Range    WBC 12.4 (H) 3.6 - 11.0 K/uL    RBC 4.63 3.80 - 5.20 M/uL    HGB 9.5 (L) 11.5 - 16.0 g/dL    HCT 30.5 (L) 35.0 - 47.0 %    MCV 65.9 (L) 80.0 - 99.0 FL    MCH 20.5 (L) 26.0 - 34.0 PG    MCHC 31.1 30.0 - 36.5 g/dL    RDW 19.0 (H) 11.5 - 14.5 %    PLATELET 523 922 - 839 K/uL    NRBC 0.0 0  WBC    ABSOLUTE NRBC 0.00 0.00 - 8.37 K/uL   METABOLIC PANEL, COMPREHENSIVE   Result Value Ref Range    Sodium 136 136 - 145 mmol/L    Potassium 3.9 3.5 - 5.1 mmol/L    Chloride 102 97 - 108 mmol/L    CO2 24 21 - 32 mmol/L    Anion gap 10 5 - 15 mmol/L    Glucose 103 (H) 65 - 100 mg/dL    BUN 8 6 - 20 MG/DL    Creatinine 0.84 0.55 - 1.02 MG/DL    BUN/Creatinine ratio 10 (L) 12 - 20      GFR est AA >60 >60 ml/min/1.73m2    GFR est non-AA >60 >60 ml/min/1.73m2    Calcium 8.5 8.5 - 10.1 MG/DL    Bilirubin, total 0.6 0.2 - 1.0 MG/DL    ALT (SGPT) 11 (L) 12 - 78 U/L    AST (SGOT) 9 (L) 15 - 37 U/L    Alk. phosphatase 73 45 - 117 U/L    Protein, total 7.4 6.4 - 8.2 g/dL    Albumin 3.2 (L) 3.5 - 5.0 g/dL    Globulin 4.2 (H) 2.0 - 4.0 g/dL    A-G Ratio 0.8 (L) 1.1 - 2.2           Physical Examination: General appearance - alert, well appearing, and in no distress and oriented to person, place, and time  Mental status - alert, oriented to person, place, and time, normal mood, behavior, speech, dress, motor activity, and thought processes      Assessment/ Plan:    fu breast team     1. Moderate episode of recurrent major depressive disorder ([facility] [address]  Resume lexapro at lower dose  Fu therapy  ? PTSD consider treatment for this if lexapro at lower dose with the addition of therapy does not help  - escitalopram oxalate (LEXAPRO) 10 mg tablet; Take 1 Tablet by mouth daily. Dispense: 30 Tablet; Refill: 1  - REFERRAL TO PSYCHIATRY          I have discussed the diagnosis with the patient and the intended plan as seen in the above orders.   The patient has received an after-visit summary and questions were answered concerning future plans. Pt conveyed understanding of plan. Medication Side Effects and Warnings were discussed with patient: yes  Patient Labs were reviewed: yes  Patient Past Records were reviewed:  yes    Neville Pool.  Meek Mandel NP

## 2023-04-17 NOTE — PROGRESS NOTES
Chitra Sharma is a 32 y.o. female  HIPAA verified by two patient identifiers. Health Maintenance Due   Topic    Hepatitis C Screening     COVID-19 Vaccine (1)    Varicella Vaccine (1 of 2 - 2-dose childhood series)    Pap Smear     Depression Monitoring      Chief Complaint   Patient presents with    Depression     Visit Vitals  /67   Pulse 98   Temp 98 °F (36.7 °C) (Temporal)   Resp 18   Ht 5' 6.5\" (1.689 m)   Wt 219 lb (99.3 kg)   LMP 04/10/2023   SpO2 98%   BMI 34.82 kg/m²       Pain Scale: 0 - No pain/10  Pain Location:   1. Have you been to the ER, urgent care clinic since your last visit? Hospitalized since your last visit? No    2. Have you seen or consulted any other health care providers outside of the [address] [place] [address] since your last visit? Include any pap smears or colon screening.  No

## 2023-07-24 ENCOUNTER — HOSPITAL ENCOUNTER (INPATIENT)
Facility: HOSPITAL | Age: 28
LOS: 7 days | Discharge: HOME | DRG: 755 | End: 2023-07-31
Attending: EMERGENCY MEDICINE | Admitting: PSYCHIATRY & NEUROLOGY
Payer: COMMERCIAL

## 2023-07-24 DIAGNOSIS — R45.851 SUICIDAL IDEATION: Primary | ICD-10-CM

## 2023-07-24 PROBLEM — F32.A DEPRESSION: Status: ACTIVE | Noted: 2023-07-24

## 2023-07-24 LAB
ALBUMIN SERPL-MCNC: 3.3 G/DL (ref 3.5–5)
ALBUMIN/GLOB SERPL: 0.9 (ref 1.1–2.2)
ALP SERPL-CCNC: 54 U/L (ref 45–117)
ALT SERPL-CCNC: 16 U/L (ref 12–78)
AMPHET UR QL SCN: NEGATIVE
ANION GAP SERPL CALC-SCNC: 1 MMOL/L (ref 5–15)
APAP SERPL-MCNC: <2 UG/ML (ref 10–30)
APPEARANCE UR: ABNORMAL
AST SERPL-CCNC: 40 U/L (ref 15–37)
BACTERIA URNS QL MICRO: ABNORMAL /HPF
BARBITURATES UR QL SCN: NEGATIVE
BASOPHILS # BLD: 0 K/UL (ref 0–0.1)
BASOPHILS NFR BLD: 0 % (ref 0–1)
BENZODIAZ UR QL: NEGATIVE
BILIRUB SERPL-MCNC: 0.6 MG/DL (ref 0.2–1)
BILIRUB UR QL: NEGATIVE
BUN SERPL-MCNC: 10 MG/DL (ref 6–20)
BUN/CREAT SERPL: 9 (ref 12–20)
CALCIUM SERPL-MCNC: 8.4 MG/DL (ref 8.5–10.1)
CANNABINOIDS UR QL SCN: NEGATIVE
CHLORIDE SERPL-SCNC: 107 MMOL/L (ref 97–108)
CK SERPL-CCNC: 107 U/L (ref 26–192)
CO2 SERPL-SCNC: 27 MMOL/L (ref 21–32)
COCAINE UR QL SCN: POSITIVE
COLOR UR: ABNORMAL
COMMENT:: NORMAL
CREAT SERPL-MCNC: 1.15 MG/DL (ref 0.55–1.02)
DIFFERENTIAL METHOD BLD: ABNORMAL
EOSINOPHIL # BLD: 0.3 K/UL (ref 0–0.4)
EOSINOPHIL NFR BLD: 3 % (ref 0–7)
EPITH CASTS URNS QL MICRO: ABNORMAL /LPF
ERYTHROCYTE [DISTWIDTH] IN BLOOD BY AUTOMATED COUNT: 15.1 % (ref 11.5–14.5)
ETHANOL SERPL-MCNC: <10 MG/DL (ref 0–0.08)
GLOBULIN SER CALC-MCNC: 3.7 G/DL (ref 2–4)
GLUCOSE SERPL-MCNC: 97 MG/DL (ref 65–100)
GLUCOSE UR STRIP.AUTO-MCNC: NEGATIVE MG/DL
HCG UR QL: NEGATIVE
HCT VFR BLD AUTO: 32.3 % (ref 35–47)
HGB BLD-MCNC: 10.2 G/DL (ref 11.5–16)
HGB UR QL STRIP: NEGATIVE
IMM GRANULOCYTES # BLD AUTO: 0 K/UL (ref 0–0.04)
IMM GRANULOCYTES NFR BLD AUTO: 1 % (ref 0–0.5)
KETONES UR QL STRIP.AUTO: 15 MG/DL
LEUKOCYTE ESTERASE UR QL STRIP.AUTO: NEGATIVE
LYMPHOCYTES # BLD: 1.6 K/UL (ref 0.8–3.5)
LYMPHOCYTES NFR BLD: 19 % (ref 12–49)
Lab: ABNORMAL
MCH RBC QN AUTO: 22.5 PG (ref 26–34)
MCHC RBC AUTO-ENTMCNC: 31.6 G/DL (ref 30–36.5)
MCV RBC AUTO: 71.3 FL (ref 80–99)
METHADONE UR QL: NEGATIVE
MONOCYTES # BLD: 0.5 K/UL (ref 0–1)
MONOCYTES NFR BLD: 6 % (ref 5–13)
NEUTS SEG # BLD: 5.8 K/UL (ref 1.8–8)
NEUTS SEG NFR BLD: 71 % (ref 32–75)
NITRITE UR QL STRIP.AUTO: NEGATIVE
NRBC # BLD: 0 K/UL (ref 0–0.01)
NRBC BLD-RTO: 0 PER 100 WBC
OPIATES UR QL: POSITIVE
PCP UR QL: NEGATIVE
PH UR STRIP: 6.5 (ref 5–8)
PLATELET # BLD AUTO: 160 K/UL (ref 150–400)
POTASSIUM SERPL-SCNC: 4.9 MMOL/L (ref 3.5–5.1)
PROT SERPL-MCNC: 7 G/DL (ref 6.4–8.2)
PROT UR STRIP-MCNC: NEGATIVE MG/DL
RBC # BLD AUTO: 4.53 M/UL (ref 3.8–5.2)
RBC #/AREA URNS HPF: ABNORMAL /HPF (ref 0–5)
SALICYLATES SERPL-MCNC: <1.7 MG/DL (ref 2.8–20)
SARS-COV-2 RNA RESP QL NAA+PROBE: NOT DETECTED
SODIUM SERPL-SCNC: 135 MMOL/L (ref 136–145)
SOURCE: NORMAL
SP GR UR REFRACTOMETRY: 1.03 (ref 1–1.03)
SPECIMEN HOLD: NORMAL
SPECIMEN HOLD: NORMAL
UROBILINOGEN UR QL STRIP.AUTO: 1 EU/DL (ref 0.2–1)
VALPROATE SERPL-MCNC: 57 UG/ML (ref 50–100)
VALPROATE SERPL-MCNC: 64 UG/ML (ref 50–100)
VALPROATE SERPL-MCNC: 75 UG/ML (ref 50–100)
WBC # BLD AUTO: 8.2 K/UL (ref 3.6–11)
WBC URNS QL MICRO: ABNORMAL /HPF (ref 0–4)

## 2023-07-24 PROCEDURE — 82550 ASSAY OF CK (CPK): CPT

## 2023-07-24 PROCEDURE — 80061 LIPID PANEL: CPT

## 2023-07-24 PROCEDURE — 81001 URINALYSIS AUTO W/SCOPE: CPT

## 2023-07-24 PROCEDURE — 90791 PSYCH DIAGNOSTIC EVALUATION: CPT

## 2023-07-24 PROCEDURE — 80307 DRUG TEST PRSMV CHEM ANLYZR: CPT

## 2023-07-24 PROCEDURE — 87086 URINE CULTURE/COLONY COUNT: CPT

## 2023-07-24 PROCEDURE — 36415 COLL VENOUS BLD VENIPUNCTURE: CPT

## 2023-07-24 PROCEDURE — 93005 ELECTROCARDIOGRAM TRACING: CPT | Performed by: FAMILY MEDICINE

## 2023-07-24 PROCEDURE — 80179 DRUG ASSAY SALICYLATE: CPT

## 2023-07-24 PROCEDURE — 80053 COMPREHEN METABOLIC PANEL: CPT

## 2023-07-24 PROCEDURE — 85025 COMPLETE CBC W/AUTO DIFF WBC: CPT

## 2023-07-24 PROCEDURE — 82077 ASSAY SPEC XCP UR&BREATH IA: CPT

## 2023-07-24 PROCEDURE — 80164 ASSAY DIPROPYLACETIC ACD TOT: CPT

## 2023-07-24 PROCEDURE — 87635 SARS-COV-2 COVID-19 AMP PRB: CPT

## 2023-07-24 PROCEDURE — 2580000003 HC RX 258: Performed by: FAMILY MEDICINE

## 2023-07-24 PROCEDURE — 80143 DRUG ASSAY ACETAMINOPHEN: CPT

## 2023-07-24 PROCEDURE — 99285 EMERGENCY DEPT VISIT HI MDM: CPT

## 2023-07-24 PROCEDURE — 1100000000 HC RM PRIVATE

## 2023-07-24 PROCEDURE — 81025 URINE PREGNANCY TEST: CPT

## 2023-07-24 RX ORDER — 0.9 % SODIUM CHLORIDE 0.9 %
1000 INTRAVENOUS SOLUTION INTRAVENOUS ONCE
Status: COMPLETED | OUTPATIENT
Start: 2023-07-24 | End: 2023-07-24

## 2023-07-24 RX ADMIN — SODIUM CHLORIDE 1000 ML: 9 INJECTION, SOLUTION INTRAVENOUS at 13:53

## 2023-07-24 ASSESSMENT — ENCOUNTER SYMPTOMS
ABDOMINAL PAIN: 0
COUGH: 0
BACK PAIN: 0
SHORTNESS OF BREATH: 0
NAUSEA: 0
VOMITING: 0

## 2023-07-24 NOTE — ED TRIAGE NOTES
Pt states she took a lot of pills yesterday, denies HI, denies auditory or visual hallucinations, +SI , pt took these pills at 7-8 pm last night , pt took a mixture of medication , sitter requested , denies having any alcohol, drugs or weapons on her

## 2023-07-24 NOTE — ED NOTES
RN spoke to poison control for update. Was told they will continue to follow and call later for another update.      Dolores Landeros, MRAILIA  07/24/23 6428

## 2023-07-24 NOTE — ED PROVIDER NOTES
[facility] EMERGENCY DEP  EMERGENCY DEPARTMENT ENCOUNTER      Pt Name: Yolanda Farrar  MRN: [id number]  [address] 1995  Date of evaluation: 7/24/2023  Provider: TETE Young NP    CHIEF COMPLAINT       Chief Complaint   Patient presents with    Mental Health Problem         HISTORY OF PRESENT ILLNESS   (Location/Symptom, Timing/Onset, Context/Setting, Quality, Duration, Modifying Factors, Severity)  Note limiting factors. Patient is a 26-year-old female with past medical history of suicidal ideation, PTSD, depression, breast cancer presenting to the emergency department for evaluation of suicidal ideation. Reports that she began to feel suicidal yesterday and attempted to overdose on medication last night. States that she took an unknown amount at unknown dosage of Abilify, Depakote, hydroxyzine, and \"something for nightmares. \". She had no side effects when she took these medications and went to bed as normal.  Today she woke up and felt some drowsiness and lightheadedness, but still suicidal.  She decided to present to the emergency department for further evaluation. Reports prior history of suicide attempts. Denies any homicidal ideation. Denies hallucinations or delusions. No other complaints. The history is provided by the patient. Review of External Medical Records:     Nursing Notes were reviewed. REVIEW OF SYSTEMS    (2-9 systems for level 4, 10 or more for level 5)     Review of Systems   Constitutional:  Positive for fatigue. Negative for unexpected weight change. HENT:  Negative for congestion. Eyes:  Negative for visual disturbance. Respiratory:  Negative for cough and shortness of breath. Cardiovascular:  Negative for chest pain and palpitations. Gastrointestinal:  Negative for abdominal pain, nausea and vomiting. Endocrine: Negative for polyuria. Genitourinary:  Negative for dysuria and flank pain. Musculoskeletal:  Negative for back pain.    Skin: Negative for pallor. Allergic/Immunologic: Negative for immunocompromised state. Neurological:  Negative for dizziness and headaches. Hematological:  Negative for adenopathy. Psychiatric/Behavioral:  Positive for suicidal ideas. Negative for agitation. Except as noted above the remainder of the review of systems was reviewed and negative. PAST MEDICAL HISTORY     Past Medical History:   Diagnosis Date    Anxiety     Cancer ([address]) 2019    RIGHT BREAST    Depression     PTSD (post-traumatic stress disorder)     Suicidal intent          SURGICAL HISTORY       Past Surgical History:   Procedure Laterality Date    BREAST SURGERY      CHOLECYSTECTOMY  2013    US BREAST BIOPSY W LOC DEVICE 1ST LESION RIGHT Right 3/26/2019    US BREAST NEEDLE BIOPSY RIGHT 3/26/2019 [facility] RAD MAMMO         CURRENT MEDICATIONS       Previous Medications    ASPIRIN 81 MG EC TABLET    Take 81 mg by mouth daily    BUSPIRONE (BUSPAR) 7.5 MG TABLET    Take 7.5 mg by mouth 3 times daily    ESCITALOPRAM (LEXAPRO) 10 MG TABLET    Lexapro 10 mg tablet   Take 1 tablet every day by oral route. LIDOCAINE-PRILOCAINE (EMLA) 2.5-2.5 % CREAM    Apply topically as needed    ONDANSETRON (ZOFRAN-ODT) 4 MG DISINTEGRATING TABLET    Take 4 mg by mouth every 8 hours as needed    PROCHLORPERAZINE (COMPAZINE) 10 MG TABLET    Take 5 mg by mouth every 6 hours as needed    SERTRALINE (ZOLOFT) 50 MG TABLET    Take 50 mg by mouth daily    TAMOXIFEN (NOLVADEX) 10 MG TABLET    Take by mouth daily       ALLERGIES     Patient has no allergy information on record.     FAMILY HISTORY       Family History   Problem Relation Age of Onset    Psychiatric Disorder Mother     Psychiatric Disorder Sister     Hypertension Mother           SOCIAL HISTORY       Social History     Socioeconomic History    Marital status: Single   Tobacco Use    Smoking status: Former    Smokeless tobacco: Former     Quit date: 12/17/2019   Substance and Sexual Activity    Alcohol use:

## 2023-07-25 LAB
BACTERIA SPEC CULT: NORMAL
CC UR VC: NORMAL
EKG ATRIAL RATE: 79 BPM
EKG DIAGNOSIS: NORMAL
EKG P AXIS: 30 DEGREES
EKG P-R INTERVAL: 136 MS
EKG Q-T INTERVAL: 388 MS
EKG QRS DURATION: 78 MS
EKG QTC CALCULATION (BAZETT): 444 MS
EKG R AXIS: 50 DEGREES
EKG T AXIS: 3 DEGREES
EKG VENTRICULAR RATE: 79 BPM
SERVICE CMNT-IMP: NORMAL

## 2023-07-25 PROCEDURE — 93010 ELECTROCARDIOGRAM REPORT: CPT | Performed by: SPECIALIST

## 2023-07-25 PROCEDURE — 6370000000 HC RX 637 (ALT 250 FOR IP): Performed by: NURSE PRACTITIONER

## 2023-07-25 PROCEDURE — 1240000000 HC EMOTIONAL WELLNESS R&B

## 2023-07-25 RX ORDER — DIPHENHYDRAMINE HYDROCHLORIDE 50 MG/ML
50 INJECTION INTRAMUSCULAR; INTRAVENOUS EVERY 4 HOURS PRN
Status: DISCONTINUED | OUTPATIENT
Start: 2023-07-25 | End: 2023-07-31 | Stop reason: HOSPADM

## 2023-07-25 RX ORDER — ARIPIPRAZOLE 5 MG/1
5 TABLET ORAL DAILY
Status: DISCONTINUED | OUTPATIENT
Start: 2023-07-25 | End: 2023-07-27

## 2023-07-25 RX ORDER — ARIPIPRAZOLE 5 MG/1
5 TABLET ORAL DAILY
Status: ON HOLD | COMMUNITY
End: 2023-07-31 | Stop reason: HOSPADM

## 2023-07-25 RX ORDER — TRAZODONE HYDROCHLORIDE 50 MG/1
50 TABLET ORAL NIGHTLY PRN
Status: DISCONTINUED | OUTPATIENT
Start: 2023-07-25 | End: 2023-07-29

## 2023-07-25 RX ORDER — POLYETHYLENE GLYCOL 3350 17 G/17G
17 POWDER, FOR SOLUTION ORAL DAILY PRN
Status: DISCONTINUED | OUTPATIENT
Start: 2023-07-25 | End: 2023-07-31 | Stop reason: HOSPADM

## 2023-07-25 RX ORDER — HYDROXYZINE 50 MG/1
50 TABLET, FILM COATED ORAL 3 TIMES DAILY PRN
Status: DISCONTINUED | OUTPATIENT
Start: 2023-07-25 | End: 2023-07-31 | Stop reason: HOSPADM

## 2023-07-25 RX ORDER — ACETAMINOPHEN 325 MG/1
650 TABLET ORAL EVERY 4 HOURS PRN
Status: DISCONTINUED | OUTPATIENT
Start: 2023-07-25 | End: 2023-07-31 | Stop reason: HOSPADM

## 2023-07-25 RX ORDER — MAGNESIUM HYDROXIDE/ALUMINUM HYDROXICE/SIMETHICONE 120; 1200; 1200 MG/30ML; MG/30ML; MG/30ML
30 SUSPENSION ORAL EVERY 6 HOURS PRN
Status: DISCONTINUED | OUTPATIENT
Start: 2023-07-25 | End: 2023-07-31 | Stop reason: HOSPADM

## 2023-07-25 RX ORDER — HYDROXYZINE 50 MG/1
50 TABLET, FILM COATED ORAL EVERY 6 HOURS PRN
Status: ON HOLD | COMMUNITY
End: 2023-07-31 | Stop reason: HOSPADM

## 2023-07-25 RX ORDER — HALOPERIDOL 5 MG/1
5 TABLET ORAL EVERY 4 HOURS PRN
Status: DISCONTINUED | OUTPATIENT
Start: 2023-07-25 | End: 2023-07-31 | Stop reason: HOSPADM

## 2023-07-25 RX ORDER — DOXEPIN HYDROCHLORIDE 25 MG/1
25 CAPSULE ORAL NIGHTLY
Status: ON HOLD | COMMUNITY
End: 2023-07-25 | Stop reason: ALTCHOICE

## 2023-07-25 RX ORDER — DIVALPROEX SODIUM 250 MG/1
250 TABLET, DELAYED RELEASE ORAL 2 TIMES DAILY
Status: ON HOLD | COMMUNITY
End: 2023-07-31 | Stop reason: HOSPADM

## 2023-07-25 RX ORDER — HALOPERIDOL 5 MG/ML
5 INJECTION INTRAMUSCULAR EVERY 4 HOURS PRN
Status: DISCONTINUED | OUTPATIENT
Start: 2023-07-25 | End: 2023-07-31 | Stop reason: HOSPADM

## 2023-07-25 RX ORDER — CLONIDINE HYDROCHLORIDE 0.1 MG/1
0.1 TABLET ORAL
Status: ON HOLD | COMMUNITY
End: 2023-07-31 | Stop reason: HOSPADM

## 2023-07-25 RX ORDER — SENNOSIDES A AND B 8.6 MG/1
1 TABLET, FILM COATED ORAL DAILY PRN
Status: DISCONTINUED | OUTPATIENT
Start: 2023-07-25 | End: 2023-07-31 | Stop reason: HOSPADM

## 2023-07-25 RX ADMIN — ARIPIPRAZOLE 5 MG: 5 TABLET ORAL at 15:05

## 2023-07-25 ASSESSMENT — SLEEP AND FATIGUE QUESTIONNAIRES
DO YOU USE A SLEEP AID: NO
SLEEP PATTERN: DIFFICULTY FALLING ASLEEP
DO YOU HAVE DIFFICULTY SLEEPING: YES
AVERAGE NUMBER OF SLEEP HOURS: 0.86

## 2023-07-25 ASSESSMENT — LIFESTYLE VARIABLES
HOW OFTEN DO YOU HAVE A DRINK CONTAINING ALCOHOL: MONTHLY OR LESS
HOW MANY STANDARD DRINKS CONTAINING ALCOHOL DO YOU HAVE ON A TYPICAL DAY: 1 OR 2

## 2023-07-25 NOTE — PLAN OF CARE
Problem: Depression  Goal: Will be euthymic at discharge  Description: INTERVENTIONS:  1. Administer medication as ordered  2. Provide emotional support via 1:1 interaction with staff  3. Encourage involvement in milieu/groups/activities  4. Monitor for social isolation  Outcome: Progressing  Note: Out on unit passively engaged attending and participating in activities. Mood and affect slightly blunted and reports sadness and anxiety. Describes increase social stressors such as a recent breakup, young children with increase use of etoh and substances. Describes inconsistently taking her prescribed medications. Discussed PTA meds and adjustments recommended. Pt verbalized understanding. Staff focus is on offering support     Problem: Anxiety  Goal: Will report anxiety at manageable levels  Description: INTERVENTIONS:  1. Administer medication as ordered  2. Teach and rehearse alternative coping skills  3.  Provide emotional support with 1:1 interaction with staff  Outcome: Progressing  Flowsheets (Taken 7/25/2023 1233)  Will report anxiety at manageable levels:   Administer medication as ordered   Teach and rehearse alternative coping skills
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
## 2023-07-25 NOTE — INTERDISCIPLINARY ROUNDS
TRANSFER - IN REPORT:    Verbal report received from [address] on [place] [address]  being received from [facility] [place] ED for routine progression of patient care      Report consisted of patient's Situation, Background, Assessment and   Recommendations(SBAR). Information from the following report(s) ED Encounter Summary and Recent Results was reviewed with the receiving nurse. Opportunity for questions and clarification was provided. Assessment completed upon patient's arrival to unit and care assumed.

## 2023-07-25 NOTE — BH NOTE
GROUP THERAPY PROGRESS NOTE    Patient did not participate in self-care group.     JAMES Pichardo, [facility]
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
## 2023-07-25 NOTE — BH NOTE
GROUP THERAPY PROGRESS NOTE    Patient is participating in Coping Skills group. Group time: 45 minutes    Personal goal for participation: To gain an understanding of the importance of self-awareness. Goal orientation: Personal    Group therapy participation: active     Therapeutic interventions reviewed and discussed:  Group members were guided through developing an understanding of how self-awareness contributes to our ability to cope with life stressors. Members engaged in discussion about current Self-awareness. Group members had the opportunity to assess their self-awareness. Handouts provided. Impression of participation: Pt was present and engaged in discussion with SW. Pt added insight to topic and displayed understanding of material. Pt was calm, cooperative.
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
## 2023-07-25 NOTE — PROGRESS NOTES
SPIRITUALITY GROUP      Meeting Topic: Power of Prayer and Meditation    Meeting Time:  45-60 minutes    Meeting Goal: Participants will increase their awareness of prayer and meditation and explore the function of each in their personal spiritual lives. Meeting Outcome: Participants will engage in self-reflection, community building, and incorporating spiritual practices into their healing journey. Levon Edwards attended and participated in the group appropriately respective of current diagnosis. For additional spiritual care, please contact the  on-call at ([phone]). Rev.  Sulma Francis MDiv, MS, [facility]  Staff

## 2023-07-25 NOTE — BH NOTE
4 Eyes Skin Assessment     NAME:  Ofelia Winter  YOB: 1995  MEDICAL RECORD NUMBER:  [id number]    The patient is being assessed for  Admission    I agree that at least one RN has performed a thorough Head to Toe Skin Assessment on the patient. ALL assessment sites listed below have been assessed. Areas assessed by both nurses:    Head, Face, Ears, Shoulders, Back, Chest, Arms, Elbows, Hands, Sacrum. Buttock, Coccyx, Ischium, and Legs. Feet and Heels        Does the Patient have a Wound?  No noted wound(s)       [place] Prevention initiated by RN: Yes  Wound Care Orders initiated by RN: No    Pressure Injury (Stage 3,4, Unstageable, DTI, NWPT, and Complex wounds) if present, place Wound referral order by RN under : No    New Ostomies, if present place, Ostomy referral order under : No     Nurse 1 eSignature: Electronically signed by Jennifer Castillo on 7/25/23 at 12:59 AM EDT    **SHARE this note so that the co-signing nurse can place an eSignature**    Nurse 2 eSignature: Electronically signed by Preet Herring RN on 7/25/23 at 4:22 AM EDT

## 2023-07-25 NOTE — INTERDISCIPLINARY ROUNDS
Behavioral Health Interdisciplinary Rounds     Patient Name: Esperanza Vital  Age: 32 y.o. Room/Bed:  729/  Primary Diagnosis: Depression   Admission Status: Voluntary    Readmission within 30 days: No  Power of  in place: No  Patient requires a blocked bed: No          Reason for blocked bed:   Sleep hours: 6       Participation in Care/Groups:  new admit  Medication Compliant?:     PRNS (last 24 hours): None   Restraints (last 24 hours):  No  __________________________________________________  OQ Admission Analysis Survey completed:  OQ Admission Analysis Survey score:  __________________________________________________     Alcohol screening (AUDIT) completed -     If applicable, date SBIRT discussed in treatment team AND documented:    Tobacco - patient is a smoker:    Illegal Drugs use:      24 hour chart check complete: Yes    _______________________________________________    Patient goal(s) for today:   Treatment team focus/goals:   Progress note: Patient met with treatment team for the first time since admission and appears with a depressed mood and affect. Patient reports depression has been worsening recently, stating \"I've just been feeling weird\". Reports depression since 15 but has been feeling worse after a recent breakup. Patient reports feeling that everyone would be better off without her. Patient denies HI and WOODS AT [facility]. Plan to start new medication regiment, SW to coordinate with outpatient services.      Spiritual Care Consult:   Financial concerns/prescription coverage:    Family contact:                        Family requesting physician contact today:    Discharge plan:   Access to weapons :                                                              Outpatient provider(s):     LOS:  1  Expected LOS: 5-7    Participating treatment team members: Esperanza Vital, Liana Sibley, JAMES, Nena Heimlich, RN, Dee Biggs NP, Terrell MariscalD

## 2023-07-25 NOTE — ED NOTES
TRANSFER - OUT REPORT:    Verbal report given to MARILIA Santos on [employer]  being transferred to  729/01  for routine progression of patient care       Report consisted of patient's Situation, Background, Assessment and   Recommendations(SBAR). Information from the following report(s) Nurse Handoff Report, ED Encounter Summary, ED SBAR, Intake/Output, MAR, Recent Results, and Med Rec Status was reviewed with the receiving nurse. [place] Fall Assessment:                           Lines:       Opportunity for questions and clarification was provided.       Patient transported with:  Tech/ Security           Funmi Huertas RN  07/24/23 7544

## 2023-07-25 NOTE — DISCHARGE INSTRUCTIONS
DISCHARGE SUMMARY    Daryl Domignuez  : 1995  MRN: [id number]    The patient Lurdes Ruiz exhibits the ability to control behavior in a less restrictive environment. Patient's level of functioning is improving. No assaultive/destructive behavior has been observed for the past 24 hours. No suicidal/homicidal threat or behavior has been observed for the past 24 hours. There is no evidence of serious medication side effects. Patient has not been in physical or protective restraints for at least the past 24 hours. If weapons involved, how are they secured? None    Is patient aware of and in agreement with discharge plan? Yes    Arrangements for medication:  Prescriptions filled through [address] [place] [address], 30 day supply provided    Copy of discharge instructions to provider?:  Yes    Arrangements for transportation home: Family    Keep all follow up appointments as scheduled, continue to take prescribed medications per physician instructions. Mental health crisis number:  [phone] or your local mental health crisis line number at [facility] at [address] [facility] Emergency WARM LINE      [phone])      M-F: 9am to 9pm      Sat & Sun: [facility] suicide prevention lines:                             1-869-CVBKCMD ([phone])       [phone]-TALK ([phone])    Crisis Text Line:  Text HOME to [address] from Nurse    PATIENT INSTRUCTIONS:      What to do at Home:  Recommended activity: activity as tolerated,     If you experience any of the following symptoms: racing thoughts, feelings hopelessness and or suicidal thoughts immediately inform staff at [address] [place] [address]. If you experience suicidal thoughts with a plan outside of attending partial hospitalization immediately call 911    *  Please give a list of your current medications to your Primary Care Provider.     *  Please update this list whenever your medications are discontinued, doses are      changed, or new medications (including over-the-counter products) are added. *  Please carry medication information at all times in case of emergency situations. These are general instructions for a healthy lifestyle:    No smoking/ No tobacco products/ Avoid exposure to second hand smoke  Surgeon General's Warning:  Quitting smoking now greatly reduces serious risk to your health. Obesity, smoking, and sedentary lifestyle greatly increases your risk for illness    A healthy diet, regular physical exercise & weight monitoring are important for maintaining a healthy lifestyle    You may be retaining fluid if you have a history of heart failure or if you experience any of the following symptoms:  Weight gain of 3 pounds or more overnight or 5 pounds in a week, increased swelling in our hands or feet or shortness of breath while lying flat in bed. Please call your doctor as soon as you notice any of these symptoms; do not wait until your next office visit. The discharge information has been reviewed with the patient. The patient verbalized understanding. Discharge medications reviewed with the patient and appropriate educational materials and side effects teaching were provided.   ___________________________________________________________________________________________________________________________________

## 2023-07-25 NOTE — BH NOTE
GROUP THERAPY PROGRESS NOTE    Patient is participating in Safety Planning group. Group time: 15-30 minutes    Personal goal for participation: To complete safety plan     Goal orientation: Personal    Group therapy participation: Active     Therapeutic interventions reviewed and discussed:  Group members were supported in filling out safety plans. Pts are able to develop and document a strategy to remain safe after discharge. SW provided feedback about questions/concerns of pts. Pts returned safety plans when completed. Impression of participation:  SW provided safety plan to pt to be completed independently.     JAMES Banuelos, HP-A

## 2023-07-26 PROCEDURE — 93005 ELECTROCARDIOGRAM TRACING: CPT

## 2023-07-26 PROCEDURE — 6370000000 HC RX 637 (ALT 250 FOR IP): Performed by: NURSE PRACTITIONER

## 2023-07-26 PROCEDURE — 1240000000 HC EMOTIONAL WELLNESS R&B

## 2023-07-26 RX ADMIN — ARIPIPRAZOLE 5 MG: 5 TABLET ORAL at 09:17

## 2023-07-26 NOTE — BH NOTE
GROUP THERAPY PROGRESS NOTE    Patient did not participate in Self-care group.     Bart Langley MSW, [facility]-A

## 2023-07-26 NOTE — PLAN OF CARE
Visible on unit, though not interactive with peers. Dull affect, depressed mood. Medication and meal compliant. Problem: Safety - Adult  Goal: Free from fall injury  7/26/2023 1611 by Zachery Libman, RN  Outcome: Progressing  7/26/2023 0826 by David Lee RN  Outcome: Progressing     Problem: Anxiety  Goal: Will report anxiety at manageable levels  Description: INTERVENTIONS:  1. Administer medication as ordered  2. Teach and rehearse alternative coping skills  3. Provide emotional support with 1:1 interaction with staff  Outcome: Progressing  Flowsheets (Taken 7/26/2023 1608)  Will report anxiety at manageable levels:   Administer medication as ordered   Provide emotional support with 1:1 interaction with staff     Problem: Depression  Goal: Will be euthymic at discharge  Description: INTERVENTIONS:  1. Administer medication as ordered  2. Provide emotional support via 1:1 interaction with staff  3. Encourage involvement in milieu/groups/activities  4.  Monitor for social isolation  7/26/2023 0826 by David Lee RN  Outcome: Not Progressing

## 2023-07-26 NOTE — INTERDISCIPLINARY ROUNDS
Behavioral Health Interdisciplinary Rounds     Patient Name: Levon Edwards  Age: 32 y.o. Room/Bed:  729/  Primary Diagnosis: Depression   Admission Status: Voluntary    Readmission within 30 days: No  Power of  in place: No  Patient requires a blocked bed: No          Reason for blocked bed:   Sleep hours: 7+       Participation in Care/Groups:  Yes  Medication Compliant?: Yes  PRNS (last 24 hours): None   Restraints (last 24 hours):  No  __________________________________________________  OQ Admission Analysis Survey completed:  OQ Admission Analysis Survey score:  __________________________________________________     Alcohol screening (AUDIT) completed -     If applicable, date SBIRT discussed in treatment team AND documented:    Tobacco - patient is a smoker:    Illegal Drugs use:      24 hour chart check complete: Yes    _______________________________________________    Patient goal(s) for today:   Treatment team focus/goals:   Progress note: Patient met with treatment team and appeared with a depressed mood and affect. Patient denies SI/HI and WOODS AT [facility] at this time but reports increased tearfulness and isolation today. Patient reports fair appetite and unable to state how sleep was, does not want to try sleep medications at this time. SW to contact [facility] and refer for PHP.      Spiritual Care Consult:   Financial concerns/prescription coverage:    Family contact:                        Family requesting physician contact today:    Discharge plan:   Access to weapons :                                                              Outpatient provider(s):     LOS:  2  Expected LOS: 5-7    Participating treatment team members: Levon Edwards, Jose Juan Kim MSW, Roderick Shearer, MARILIA, Sri Wood, ORQUIDEA, Aure Seo PharmD

## 2023-07-26 NOTE — PLAN OF CARE
Problem: Safety - Adult  Goal: Free from fall injury  Outcome: Progressing     Problem: Depression  Goal: Will be euthymic at discharge  Description: INTERVENTIONS:  1. Administer medication as ordered  2. Provide emotional support via 1:1 interaction with staff  3. Encourage involvement in milieu/groups/activities  4. Monitor for social isolation  7/26/2023 0045 by Cheikh Dominguez RN  Outcome: Progressing     Problem: Anxiety  Goal: Will report anxiety at manageable levels  Description: INTERVENTIONS:  1. Administer medication as ordered  2. Teach and rehearse alternative coping skills  3.  Provide emotional support with 1:1 interaction with staff  7/26/2023 0045 by Cheikh Dominguez RN  Outcome: Progressing

## 2023-07-26 NOTE — PLAN OF CARE
Problem: Safety - Adult  Goal: Free from fall injury  7/26/2023 0826 by Lien Yo RN  Outcome: Progressing  7/26/2023 0045 by Mikie Hanks RN  Outcome: Progressing     Problem: Anxiety  Goal: Will report anxiety at manageable levels  Description: INTERVENTIONS:  1. Administer medication as ordered  2. Teach and rehearse alternative coping skills  3. Provide emotional support with 1:1 interaction with staff  7/26/2023 0045 by Mikie Hanks RN  Outcome: Progressing     Problem: Depression  Goal: Will be euthymic at discharge  Description: INTERVENTIONS:  1. Administer medication as ordered  2. Provide emotional support via 1:1 interaction with staff  3. Encourage involvement in milieu/groups/activities  4.  Monitor for social isolation  7/26/2023 0826 by Lien Yo RN  Outcome: Not Progressing  7/26/2023 0045 by Mikie Hanks RN  Outcome: Progressing

## 2023-07-27 LAB
CHOLEST SERPL-MCNC: 107 MG/DL
CK SERPL-CCNC: 33 U/L (ref 26–192)
EST. AVERAGE GLUCOSE BLD GHB EST-MCNC: 91 MG/DL
HBA1C MFR BLD: 4.8 % (ref 4–5.6)
HDLC SERPL-MCNC: 57 MG/DL
HDLC SERPL: 1.9 (ref 0–5)
LDLC SERPL CALC-MCNC: 35.4 MG/DL (ref 0–100)
TRIGL SERPL-MCNC: 73 MG/DL
VLDLC SERPL CALC-MCNC: 14.6 MG/DL

## 2023-07-27 PROCEDURE — 83036 HEMOGLOBIN GLYCOSYLATED A1C: CPT

## 2023-07-27 PROCEDURE — 6370000000 HC RX 637 (ALT 250 FOR IP)

## 2023-07-27 PROCEDURE — 1240000000 HC EMOTIONAL WELLNESS R&B

## 2023-07-27 PROCEDURE — 6370000000 HC RX 637 (ALT 250 FOR IP): Performed by: NURSE PRACTITIONER

## 2023-07-27 RX ORDER — ARIPIPRAZOLE 10 MG/1
10 TABLET ORAL DAILY
Status: DISCONTINUED | OUTPATIENT
Start: 2023-07-28 | End: 2023-07-31 | Stop reason: HOSPADM

## 2023-07-27 RX ADMIN — ARIPIPRAZOLE 5 MG: 5 TABLET ORAL at 08:47

## 2023-07-27 RX ADMIN — ACETAMINOPHEN 650 MG: 325 TABLET ORAL at 13:56

## 2023-07-27 ASSESSMENT — PAIN DESCRIPTION - DESCRIPTORS: DESCRIPTORS: ACHING

## 2023-07-27 ASSESSMENT — PAIN SCALES - GENERAL
PAINLEVEL_OUTOF10: 0
PAINLEVEL_OUTOF10: 7

## 2023-07-27 ASSESSMENT — PAIN DESCRIPTION - LOCATION: LOCATION: HEAD

## 2023-07-27 NOTE — BH NOTE
GROUP THERAPY PROGRESS NOTE    Patient is participating in psychotherapy group. Group time: 45 minutes    Personal goal for participation: To gain an understanding of the 12 basic irrational beliefs and identify personal interpretations as they apply. Goal orientation: Personal    Group therapy participation: Active     Therapeutic interventions reviewed and discussed:  Group members were guided through developing an understanding of irrational beliefs and how they affect thought processes and behaviors. Members completed and activity and then engaged in discussion. Handouts provided. Impression of participation:  Pt was present and engaged in group discussion. Pt added insight to topic. Pt interacted with SW and peers. Pt was calm, cooperative.        Aziza Aquino, JAMES, HP-A

## 2023-07-27 NOTE — INTERDISCIPLINARY ROUNDS
Behavioral Health Interdisciplinary Rounds     Patient Name: Kevon Pina  Age: 32 y.o. Room/Bed:  729/  Primary Diagnosis: Depression   Admission Status: Voluntary    Readmission within 30 days: No  Power of  in place: No  Patient requires a blocked bed: No          Reason for blocked bed:   Sleep hours: 7+       Participation in Care/Groups:  Yes  Medication Compliant?: Yes  PRNS (last 24 hours): None   Restraints (last 24 hours):  No  __________________________________________________  OQ Admission Analysis Survey completed:  OQ Admission Analysis Survey score:  __________________________________________________     Alcohol screening (AUDIT) completed -     If applicable, date SBIRT discussed in treatment team AND documented:    Tobacco - patient is a smoker:    Illegal Drugs use:      24 hour chart check complete: Yes    _______________________________________________    Patient goal(s) for today:   Treatment team focus/goals:   Progress note: Patient initially declined to meet with treatment team due to feeling too emotional, patient has been very tearful and met with the Dawn 1:1, patient has been more present in the milieu but mood is still depressed. Patient did then meet with treatment team after some time, reports feeling up and down today and is feeling very hurt after the things that happened prior to admission. Patient denies SI/HI and WOODS AT [facility] at this time, patient is crying throughout session. Patient is now agreeable to increasing Abilify to 10mg. Patient reports fair appetite and fair sleep. PHP referral sent to [facility].      Spiritual Care Consult:   Financial concerns/prescription coverage:    Family contact:                        Family requesting physician contact today:    Discharge plan:   Access to weapons :                                                              Outpatient provider(s):     LOS:  3  Expected LOS: 7    Participating treatment team members: Kevon Pina

## 2023-07-27 NOTE — PLAN OF CARE
Problem: Safety - Adult  Goal: Free from fall injury  7/26/2023 2359 by Jamin Carson RN  Outcome: Progressing  Patient received resting quietly in bed. No signs of distress. Even and unlabored breathing. Staff will continue to monitor safety q15 and provide support.

## 2023-07-27 NOTE — BH NOTE
GROUP THERAPY PROGRESS NOTE    Patient is participating in Coping Skills group. Group time: 60 minutes    Personal goal for participation: To develop an understanding of Powerlessness and the need for control    Goal orientation: Personal    Group therapy participation: Active     Therapeutic interventions reviewed and discussed:  SW guided members through recognizing powerlessness in their lives. Pts used handouts to help visualize areas of life that are out of their control. SW helped members develop coping strategies to help navigate our lack of control. Handout provided. Impression of participation:  Pt was present and engaged in group discussion. Pt added insight to group topic. Pt interacted with SW and peers. Pt was calm, cooperative.        JAMES Lane, QMHP-A

## 2023-07-27 NOTE — PLAN OF CARE
Problem: Depression  Goal: Will be euthymic at discharge  Description: INTERVENTIONS:  1. Administer medication as ordered  2. Provide emotional support via 1:1 interaction with staff  3. Encourage involvement in milieu/groups/activities  4. Monitor for social isolation  Outcome: Progressing  Note: Attend groups and activities this am, spoke with  late morning. Currently in room resting in bed declining to attend tx team describing feeling emotional exhausted and not wanting to talk. Will continue to offer support and reassurance. Problem: Anxiety  Goal: Will report anxiety at manageable levels  Description: INTERVENTIONS:  1. Administer medication as ordered  2. Teach and rehearse alternative coping skills  3.  Provide emotional support with 1:1 interaction with staff  Outcome: Progressing

## 2023-07-27 NOTE — PROGRESS NOTES
Spiritual Care Assessment/Progress Note  [facility]    Name: Raquel Tam MRN: [id number]    Age: 32 y.o. Sex: female   Language: English     Date: 7/27/2023            Total Time Calculated: 30 min              Spiritual Assessment begun in [facility]  Service Provided For[de-identified] Patient  Referral/Consult From[de-identified] Patient  Encounter Overview/Reason : Spiritual/Emotional Needs, Behavioral Health    Spiritual beliefs:      [] Involved in a jenny tradition/spiritual practice:      [] Supported by a jenny community:      [] Claims no spiritual orientation:      [] Seeking spiritual identity:           [x] Adheres to an individual form of spirituality: Believes in higher power     [] Not able to assess:                Identified resources for coping and support system:   Support System: Family members       [] Prayer                  [] Devotional reading               [] Music                  [] Guided Imagery     [] Pet visits                                        [x] Other: (COMMENT)     Specific area/focus of visit   Encounter:    Crisis:    Spiritual/Emotional needs: Type: Emotional Distress  Ritual, Rites and Sacraments:    Grief, Loss, and Adjustments:    Ethics/Mediation:    Behavioral Health: Type : Initial Encounter  Palliative Care: Advance Care Planning:      Plan/Referrals: Developed Care Plan (see consult note)    Narrative:     Initial encounter with Mykel for emotional support. She shared the reason for this admission, medical journey with breast cancer, and parental/relational stressors following the birth of her son, 7 mos ago. I held space for her tears, as we processed her fears, hopes and explored coping resources. Mykel seemed comforted after our encounter. She may benefit from additional visits for emotional support, and encouragement. I will revisit during unit rounds. She is aware of  availability.       For additional spiritual care, please contact the

## 2023-07-28 LAB
EKG ATRIAL RATE: 81 BPM
EKG DIAGNOSIS: NORMAL
EKG P AXIS: 44 DEGREES
EKG P-R INTERVAL: 146 MS
EKG Q-T INTERVAL: 382 MS
EKG QRS DURATION: 76 MS
EKG QTC CALCULATION (BAZETT): 443 MS
EKG R AXIS: 55 DEGREES
EKG T AXIS: 22 DEGREES
EKG VENTRICULAR RATE: 81 BPM

## 2023-07-28 PROCEDURE — 6370000000 HC RX 637 (ALT 250 FOR IP): Performed by: NURSE PRACTITIONER

## 2023-07-28 PROCEDURE — 93010 ELECTROCARDIOGRAM REPORT: CPT | Performed by: SPECIALIST

## 2023-07-28 PROCEDURE — 1240000000 HC EMOTIONAL WELLNESS R&B

## 2023-07-28 RX ADMIN — ARIPIPRAZOLE 10 MG: 10 TABLET ORAL at 08:13

## 2023-07-28 ASSESSMENT — PAIN - FUNCTIONAL ASSESSMENT
PAIN_FUNCTIONAL_ASSESSMENT: NONE - DENIES PAIN

## 2023-07-28 ASSESSMENT — PAIN SCALES - GENERAL: PAINLEVEL_OUTOF10: 0

## 2023-07-28 NOTE — BH NOTE
Behavioral Health Interdisciplinary Rounds     Patient Name: Ariel Mo  Age: 32 y.o. Room/Bed:  729/  Primary Diagnosis: Depression   Admission Status: Voluntary     Readmission within 30 days: no  Power of  in place: no  Patient requires a blocked bed: no          Reason for blocked bed:   Sleep hours: 7++       Participation in Care/Groups:  yes  Medication Compliant?: yes  PRNS (last 24 hours): yes, pain    Restraints (last 24 hours):  no  __________________________________________________  OQ Admission Analysis Survey completed:  OQ Admission Analysis Survey score:  OQ Discharge Analysis Survey completed:  OQ Discharge Analysis Survey score:   __________________________________________________     Alcohol screening (AUDIT) completed -     If applicable, date SBIRT discussed in treatment team AND documented:    Tobacco - patient is a smoker:    Illegal Drugs use:      24 hour chart check complete: yes     _______________________________________________    Patient goal(s) for today:   Treatment team focus/goals:   Progress note: Patient met with treatment team and appeared with a fair mood and affect, reporting mood is \"so-so\". Patient denies SI/HI and WOODS AT [facility], denies side effects at this time from medications. Patient endorsing poor sleep due to doors opening and closing startling her. Plan to refer to [facility], this is closer to her sisters house and patient feels better about this location versus [facility]. Plan to discharge early next week and continue to monitor medication at current dose, patient is unsure if it is effective at this time or not. 1500: Patient unsure of PHP referral at this time, SW allowing time for patient to consider options.      Spiritual Care Consult:   Financial concerns/prescription coverage:    Family contact:                        Family requesting physician contact today:    Discharge plan:   Access to weapons :

## 2023-07-28 NOTE — PLAN OF CARE
Patient in bed with eyes closed and breathing even and unlabored. Non-skid socks present. Bed in low position. Room free of clutter and walk area clear. Light dim to promote rest and provide light. Patient monitored every 15 minutes for safety.

## 2023-07-28 NOTE — PLAN OF CARE
Problem: Depression  Goal: Will be euthymic at discharge  Description: INTERVENTIONS:  1. Administer medication as ordered  2. Provide emotional support via 1:1 interaction with staff  3. Encourage involvement in milieu/groups/activities  4. Monitor for social isolation  Outcome: Progressing  Note: Out on unit this am, passively engaged with a sad affect. Reports broken sleep with poor quality. Barriers were racing thoughts and \"emotional day\" when describing bedtime. Daily goal is to stay awake by reading and word search. Denies SI, no self harming behaviors.  Staff focus is on offering support

## 2023-07-29 PROCEDURE — 6370000000 HC RX 637 (ALT 250 FOR IP): Performed by: PSYCHIATRY & NEUROLOGY

## 2023-07-29 PROCEDURE — 6370000000 HC RX 637 (ALT 250 FOR IP): Performed by: NURSE PRACTITIONER

## 2023-07-29 PROCEDURE — 1240000000 HC EMOTIONAL WELLNESS R&B

## 2023-07-29 RX ORDER — LANOLIN ALCOHOL/MO/W.PET/CERES
9 CREAM (GRAM) TOPICAL NIGHTLY
Status: DISCONTINUED | OUTPATIENT
Start: 2023-07-29 | End: 2023-07-31 | Stop reason: HOSPADM

## 2023-07-29 RX ADMIN — Medication 9 MG: at 21:13

## 2023-07-29 RX ADMIN — ARIPIPRAZOLE 10 MG: 10 TABLET ORAL at 08:18

## 2023-07-29 ASSESSMENT — PAIN - FUNCTIONAL ASSESSMENT
PAIN_FUNCTIONAL_ASSESSMENT: NONE - DENIES PAIN

## 2023-07-29 NOTE — PLAN OF CARE
Problem: Depression  Goal: Will be euthymic at discharge  Description: INTERVENTIONS:  1. Administer medication as ordered  2. Provide emotional support via 1:1 interaction with staff  3. Encourage involvement in milieu/groups/activities  4. Monitor for social isolation  Outcome: Progressing  Note: Pt out on the unit for small periods of time. Interacting with peers and staff appropriately. Denies any thoughts of self harm currently.

## 2023-07-29 NOTE — PLAN OF CARE
Problem: Anxiety  Goal: Will report anxiety at manageable levels  Description: INTERVENTIONS:  1. Administer medication as ordered  2. Teach and rehearse alternative coping skills  3.  Provide emotional support with 1:1 interaction with staff  Recent Flowsheet Documentation

## 2023-07-30 PROCEDURE — 6370000000 HC RX 637 (ALT 250 FOR IP): Performed by: NURSE PRACTITIONER

## 2023-07-30 PROCEDURE — 1240000000 HC EMOTIONAL WELLNESS R&B

## 2023-07-30 PROCEDURE — 6370000000 HC RX 637 (ALT 250 FOR IP): Performed by: PSYCHIATRY & NEUROLOGY

## 2023-07-30 RX ADMIN — ARIPIPRAZOLE 10 MG: 10 TABLET ORAL at 08:10

## 2023-07-30 RX ADMIN — Medication 9 MG: at 21:31

## 2023-07-30 ASSESSMENT — PAIN - FUNCTIONAL ASSESSMENT: PAIN_FUNCTIONAL_ASSESSMENT: NONE - DENIES PAIN

## 2023-07-30 ASSESSMENT — PAIN SCALES - GENERAL: PAINLEVEL_OUTOF10: 0

## 2023-07-30 NOTE — PLAN OF CARE
Problem: Depression  Goal: Will be euthymic at discharge  Description: INTERVENTIONS:  1. Administer medication as ordered  2. Provide emotional support via 1:1 interaction with staff  3. Encourage involvement in milieu/groups/activities  4. Monitor for social isolation  Outcome: Progressing  Note: Pt out on the unit and interacting with select peers. Stated she had poor sleep last night due to temperature of the room. Continues to complain of nightmares. No thoughts of self harm currently.

## 2023-07-30 NOTE — BH NOTE
[facility]  PSYCHIATRIC PROGRESS NOTE    Patient: Lurdes Ruiz MRN: [id number]  SSN: [id number]    YOB: 1995  Age: 32 y.o. Sex: female      Admit Date: 7/24/2023    Length of Stay: 6 Days    Chief Complaint:    I did not sleep at all. Interval History:   7/30/23  Mykel reports feeling \"tired\" today. She slept poorly again last night and ascribes this to the temperature in her room being too low. Feels Melatonin did not help at all with her sleep. She does not want to try an alternative to this. At the present time the patient Lurdes Ruiz remains compliant with taking medications. Denies any adverse events from taking them and feels they have been beneficial.       7/29/23  Ms. Luis Courser is better today. States that she slept poorly again last night and had several nightmares again. She declines to consider taking Prazosin although it seems likely that she has never tried it before. However she did remember some benefit from taking Melatonin 10mg for sleep. Denies any SI or plan. At the present time the patient Lurdes Ruiz remains compliant with taking medications. Denies any adverse events from taking them and feels they have been beneficial.       7/28/23- Mykel is doing OK today. She tolerated her increased dose of Abilify well without adverse effects. She denies any SI/HI/AVH today. Appetite is good. She is not sleeping as well due to disruptions on the unit, but feels it is situational. No other changes or new concerns at this time. 7/27/23- Mykel states she had better energy yesterday and this morning, but now she is feeling more \"hurt. \" She denies any specific triggers to worsening mood today. She denies current SI/plan/intent, HI/plan/intent, and AVH. She is eating and sleeping OK. She is agreeable to increasing her Abilify for depression. 7/26/23- Mykel is still struggling with depression. She feels her mood is worse today than yesterday. Sleep was not good. American female who is moderately groomed, dressed in hospital gown. Makes fair eye contact. Psychomotor activity is WNL, no abnormal movements observed, no signs/symptoms of TD/EPS. Speech is normal in volume, tone, output and prosody   Mood is described as \"tired\"   Affect: euthymic  No perceptual abnormalities elicited; no auditory/visual hallucinations reported, no overt signs of psychosis or paranoia. Thought process: logical and goal directed, linear   Thought content: no SI/plan/intent, denies HI/plan/intent  Sensorium: Alert, awake and oriented X 4  Attention/Concentration: Intact  Insight: fair  Judgment: fair     Assessment and Plan:  Nneka Jensen meets criteria for a diagnosis of:  Mood disorder NOS, PTSD    7/30/23  Melatonin 9mg QHS for sleep. Group and milieu therapy  Disposition planning to continue with social work. 7/28/23- No changes at this time, disposition planning to continue. Tentative discharge early next week with PHP follow-up    7/27/23- Increasing Abilify to 10mg daily. 7/26/23- Continue the medication regimen as prescribed. Disposition planning to continue. Pt is interested in PHP. A coordinated, multidisplinary treatment team round was conducted with the patient, nurses, pharmcist,  and writer present. Discussions held with , and/or with family members; Complete current electronic health record for patient was reviewed in full including consultant notes, ancillary staff notes, nurses and tech notes, labs and vitals. I certify that this patients inpatient psychiatric hospital services furnished since the previous certification were, and continue to be, required for treatment that could reasonably be expected to improve the patient's condition, or for diagnostic study, and that the patient continues to need, on a daily basis, active treatment furnished directly by or requiring the supervision of inpatient psychiatric facility personnel.  In

## 2023-07-30 NOTE — PROGRESS NOTES
Pt was asleep when I rounded, unable able to reassess for spiritual needs. For additional spiritual care, please contact the  on-call at [phone]). Rev.  Perlita Wakefield MDiv, MS, [facility]  Staff

## 2023-07-31 VITALS
DIASTOLIC BLOOD PRESSURE: 80 MMHG | SYSTOLIC BLOOD PRESSURE: 126 MMHG | HEART RATE: 92 BPM | TEMPERATURE: 98.2 F | BODY MASS INDEX: 35.74 KG/M2 | WEIGHT: 222.4 LBS | RESPIRATION RATE: 16 BRPM | HEIGHT: 66 IN | OXYGEN SATURATION: 100 %

## 2023-07-31 PROCEDURE — 6370000000 HC RX 637 (ALT 250 FOR IP): Performed by: NURSE PRACTITIONER

## 2023-07-31 RX ORDER — ARIPIPRAZOLE 10 MG/1
10 TABLET ORAL DAILY
Qty: 30 TABLET | Refills: 0 | Status: SHIPPED | OUTPATIENT
Start: 2023-08-01

## 2023-07-31 RX ORDER — LANOLIN ALCOHOL/MO/W.PET/CERES
9 CREAM (GRAM) TOPICAL NIGHTLY
Refills: 0 | Status: SHIPPED | COMMUNITY
Start: 2023-07-31

## 2023-07-31 RX ADMIN — ARIPIPRAZOLE 10 MG: 10 TABLET ORAL at 08:12

## 2023-07-31 ASSESSMENT — PAIN SCALES - GENERAL: PAINLEVEL_OUTOF10: 0

## 2023-07-31 NOTE — BH NOTE
GROUP THERAPY PROGRESS NOTE    Patient is participating in selfcare group. Group time: 60 minutes    Personal goal for participation: Participate in guided meditation. Goal orientation: Personal    Group therapy participation: Active     Therapeutic interventions reviewed and discussed:  Group members were educated about the importance of mindfulness and meditation for physical, emotional, and psychological health. Pts then participated in a guided meditation. Impression of participation:  Pt was present and engaged in group discussion and activity.        JAMES Coulter, QMHP-A

## 2023-07-31 NOTE — DISCHARGE SUMMARY
DISCHARGE SUMMARY  Some parts of the discharge summary are from the initial Psychiatric interview that was done on admission by the admitting psychiatrist.     Name: Kenya James  MR#: [id number]  Account#: [de-identified]  : 1995  Date of Admission: 2023    Date of Discharge: 2023     TYPE OF DISCHARGE:   REGULAR     INITIAL PSYCHIATRIC INTERVIEW:   CHIEF COMPLAINT: \"I don't think I've ever stopped being depressed. \"      HISTORY OF PRESENTING COMPLAINT:  This is a 32 y.o. female who is currently admitted to the general psychiatric floor at [facility] on a voluntary basis after a suicide attempt by OD on Depakote, Abilify, hydroxyzine, and others on  night. The pt does not know how much of each medication she took. The pt reports feeling \"weird\" as the reason for the overdose. The only precipitating factor she identifies to this suicide attempt was a breakup with her child's father. They had been together on and off for the last 4 years. She slept for 12 hours, and then woke up and decided to ask her sister to take her to the hospital. She struggles with anxiety at baseline as well, and states she is always worrying. She has been mostly off her meds for the past 4 weeks, though she has been back on her Abilify for the last few days. She denies current SI, but states \"I feel like everyone is better off without me. \" No HI, no perceptual disturbances. She has been sleeping erratically, not sleeping well at night, but napping at least twice during the day each day. She reports frequent rumination, where she will play out scenarios in her head again and again, and feels the thoughts are racing and looping. She struggles to control these thoughts. She has been struggling with nightmares too, sometimes things from her past, sometimes random things unrelated to her past. Further, the pt has breast cancer, and has undergone chemo.  She believes she is in remission, but because she carries the gene

## 2023-07-31 NOTE — BH NOTE
Behavioral Health Transition Record to Provider    Patient Name: Rylee Casanova  YOB: 1995  Medical Record Number: [id number]  Date of Admission: 7/24/2023  Date of Discharge: 7/31/2023    Attending Provider: Shannan Zaidi MD  Discharging Provider: Kike Weems NP-    To contact this individual call [phone] and ask the  to page. If unavailable, ask to be transferred to [facility] Provider on call. [facility] Provider will be available on call 24/7 and during holidays. Primary Care Provider: TETE Mccormack - NP    No Known Allergies    Reason for Admission: CHIEF COMPLAINT: \"I don't think I've ever stopped being depressed. \"      HISTORY OF PRESENTING COMPLAINT:  This is a 32 y.o. female who is currently admitted to the general psychiatric floor at [facility] on a voluntary basis after a suicide attempt by OD on Depakote, Abilify, hydroxyzine, and others on Sunday night. The pt does not know how much of each medication she took. The pt reports feeling \"weird\" as the reason for the overdose. The only precipitating factor she identifies to this suicide attempt was a breakup with her child's father. They had been together on and off for the last 4 years. She slept for 12 hours, and then woke up and decided to ask her sister to take her to the hospital. She struggles with anxiety at baseline as well, and states she is always worrying. She has been mostly off her meds for the past 4 weeks, though she has been back on her Abilify for the last few days. She denies current SI, but states \"I feel like everyone is better off without me. \" No HI, no perceptual disturbances. She has been sleeping erratically, not sleeping well at night, but napping at least twice during the day each day. She reports frequent rumination, where she will play out scenarios in her head again and again, and feels the thoughts are racing and looping.  She struggles to control these

## 2023-07-31 NOTE — BH NOTE
GROUP THERAPY PROGRESS NOTE    Patient is participating in psychotherapy group. Group time: 60 minutes    Personal goal for participation: To gain an understanding of emotions and emotional regulation    Goal orientation: Personal    Group therapy participation: Active     Therapeutic interventions: Group members were educated about the 8 primary emotions. Members also gained an understanding of what emotions do, how we can regulate them and myths about emotions. Handouts provided. Impression of participation:  Pt was present and engaged in group discussion. Pt added insight to group topic. Pt was calm, cooperative.       JAMES Manrique, HP-A

## 2023-07-31 NOTE — INTERDISCIPLINARY ROUNDS
Behavioral Health Interdisciplinary Rounds     Patient Name: Abbi Rosales  Age: 32 y.o. Room/Bed:  729/  Primary Diagnosis: Depression   Admission Status: Voluntary    Readmission within 30 days: No  Power of  in place: No  Patient requires a blocked bed: No            Sleep hours: 7       Participation in Care/Groups:  Yes  Medication Compliant?: Yes  PRNS (last 24 hours): None   Restraints (last 24 hours):  No  __________________________________________________  OQ Admission Analysis Survey completed:  OQ Admission Analysis Survey score:  __________________________________________________     Alcohol screening (AUDIT) completed -     If applicable, date SBIRT discussed in treatment team AND documented:    Tobacco - patient is a smoker:    Illegal Drugs use:      24 hour chart check complete: Yes    _______________________________________________    Patient goal(s) for today:   Treatment team focus/goals:   Progress note: Patient met with treatment team and appeared with a fair mood and affect. Patient denies SI/HI and WOODS AT [facility] at this time, denies side effects from Abilify at this time. Patient presenting with future oriented thinking and is looking forward to [facility] and completing required community service hours. Plan to discharge home this afternoon via Lyft, medications filled through [facility], 30 day supply provided.       Spiritual Care Consult:   Financial concerns/prescription coverage:    Family contact:                        Family requesting physician contact today:    Discharge plan:   Access to weapons :                                                              Outpatient provider(s):     LOS:  7  Expected LOS: 7    Participating treatment team members: Abbi RosalesDavid MSW, Ketan Hicks, RN, Edmond Llanes NP, Jana MariscalD

## 2023-07-31 NOTE — PLAN OF CARE
Problem: Safety - Adult  Goal: Free from fall injury  Outcome: Progressing   Patient received resting quietly in bed. No signs of distress. Even and unlabored breathing. Staff will continue to monitor safety q15 and provide support.

## 2023-07-31 NOTE — PROGRESS NOTES
Pharmacist Discharge Medication Reconciliation    Discharging Provider: Cami Riddle NP    Significant PMH:   Past Medical History:   Diagnosis Date    Anxiety     Cancer ([address]) 2019    RIGHT BREAST    Depression     PTSD (post-traumatic stress disorder)     Suicidal intent      Chief Complaint for this Admission:   Chief Complaint   Patient presents with    Drug Overdose    Suicidal     Allergies: Patient has no known allergies. Discharge Medications:      Medication List        START taking these medications      melatonin 3 MG Tabs tablet  Take 3 tablets by mouth nightly            CHANGE how you take these medications      ARIPiprazole 10 MG tablet  Commonly known as: ABILIFY  Take 1 tablet by mouth daily  Start taking on: August 1, 2023  What changed:   medication strength  how much to take            STOP taking these medications      aspirin 81 MG EC tablet     busPIRone 7.5 MG tablet  Commonly known as: BUSPAR     cloNIDine 0.1 MG tablet  Commonly known as: CATAPRES     divalproex 250 MG DR tablet  Commonly known as: DEPAKOTE     doxepin 25 MG capsule  Commonly known as: SINEQUAN     escitalopram 10 MG tablet  Commonly known as: LEXAPRO     hydrOXYzine HCl 50 MG tablet  Commonly known as: ATARAX     lidocaine-prilocaine 2.5-2.5 % cream  Commonly known as: EMLA     ondansetron 4 MG disintegrating tablet  Commonly known as: ZOFRAN-ODT     prochlorperazine 10 MG tablet  Commonly known as: COMPAZINE     sertraline 50 MG tablet  Commonly known as: ZOLOFT            ASK your doctor about these medications      tamoxifen 10 MG tablet  Commonly known as: NOLVADEX               Where to Get Your Medications        These medications were sent to [address] [phone] [facility] [phone]  [address] [place] [address], [address] [place] [address]      Phone: [phone]   ARIPiprazole 10 MG tablet       Information about where to get these medications is not yet available    Ask your

## 2024-04-16 ENCOUNTER — TELEMEDICINE (OUTPATIENT)
Facility: CLINIC | Age: 29
End: 2024-04-16
Payer: COMMERCIAL

## 2024-04-16 DIAGNOSIS — Z85.3 HISTORY OF BREAST CANCER: ICD-10-CM

## 2024-04-16 DIAGNOSIS — Z11.3 SCREENING EXAMINATION FOR STD (SEXUALLY TRANSMITTED DISEASE): Primary | ICD-10-CM

## 2024-04-16 DIAGNOSIS — L30.9 DERMATITIS: ICD-10-CM

## 2024-04-16 PROBLEM — Z15.09 BRCA1 GENE MUTATION POSITIVE: Status: ACTIVE | Noted: 2019-04-01

## 2024-04-16 PROBLEM — Z15.01 BRCA1 GENE MUTATION POSITIVE: Status: ACTIVE | Noted: 2019-04-01

## 2024-04-16 PROCEDURE — 99213 OFFICE O/P EST LOW 20 MIN: CPT | Performed by: NURSE PRACTITIONER

## 2024-04-16 ASSESSMENT — PATIENT HEALTH QUESTIONNAIRE - PHQ9
9. THOUGHTS THAT YOU WOULD BE BETTER OFF DEAD, OR OF HURTING YOURSELF: MORE THAN HALF THE DAYS
7. TROUBLE CONCENTRATING ON THINGS, SUCH AS READING THE NEWSPAPER OR WATCHING TELEVISION: NEARLY EVERY DAY
SUM OF ALL RESPONSES TO PHQ QUESTIONS 1-9: 17
SUM OF ALL RESPONSES TO PHQ QUESTIONS 1-9: 15
10. IF YOU CHECKED OFF ANY PROBLEMS, HOW DIFFICULT HAVE THESE PROBLEMS MADE IT FOR YOU TO DO YOUR WORK, TAKE CARE OF THINGS AT HOME, OR GET ALONG WITH OTHER PEOPLE: EXTREMELY DIFFICULT
SUM OF ALL RESPONSES TO PHQ QUESTIONS 1-9: 17
1. LITTLE INTEREST OR PLEASURE IN DOING THINGS: NOT AT ALL
2. FEELING DOWN, DEPRESSED OR HOPELESS: MORE THAN HALF THE DAYS
6. FEELING BAD ABOUT YOURSELF - OR THAT YOU ARE A FAILURE OR HAVE LET YOURSELF OR YOUR FAMILY DOWN: NEARLY EVERY DAY
5. POOR APPETITE OR OVEREATING: MORE THAN HALF THE DAYS
SUM OF ALL RESPONSES TO PHQ QUESTIONS 1-9: 17
8. MOVING OR SPEAKING SO SLOWLY THAT OTHER PEOPLE COULD HAVE NOTICED. OR THE OPPOSITE, BEING SO FIGETY OR RESTLESS THAT YOU HAVE BEEN MOVING AROUND A LOT MORE THAN USUAL: NOT AT ALL
3. TROUBLE FALLING OR STAYING ASLEEP: NEARLY EVERY DAY
4. FEELING TIRED OR HAVING LITTLE ENERGY: MORE THAN HALF THE DAYS
SUM OF ALL RESPONSES TO PHQ9 QUESTIONS 1 & 2: 2

## 2024-04-16 ASSESSMENT — COLUMBIA-SUICIDE SEVERITY RATING SCALE - C-SSRS
1. WITHIN THE PAST MONTH, HAVE YOU WISHED YOU WERE DEAD OR WISHED YOU COULD GO TO SLEEP AND NOT WAKE UP?: YES
4. HAVE YOU HAD THESE THOUGHTS AND HAD SOME INTENTION OF ACTING ON THEM?: NO
5. HAVE YOU STARTED TO WORK OUT OR WORKED OUT THE DETAILS OF HOW TO KILL YOURSELF? DO YOU INTEND TO CARRY OUT THIS PLAN?: NO
2. HAVE YOU ACTUALLY HAD ANY THOUGHTS OF KILLING YOURSELF?: YES
3. HAVE YOU BEEN THINKING ABOUT HOW YOU MIGHT KILL YOURSELF?: NO
6. HAVE YOU EVER DONE ANYTHING, STARTED TO DO ANYTHING, OR PREPARED TO DO ANYTHING TO END YOUR LIFE?: NO

## 2024-04-16 ASSESSMENT — ANXIETY QUESTIONNAIRES
4. TROUBLE RELAXING: NEARLY EVERY DAY
7. FEELING AFRAID AS IF SOMETHING AWFUL MIGHT HAPPEN: NEARLY EVERY DAY
1. FEELING NERVOUS, ANXIOUS, OR ON EDGE: NEARLY EVERY DAY
5. BEING SO RESTLESS THAT IT IS HARD TO SIT STILL: SEVERAL DAYS
3. WORRYING TOO MUCH ABOUT DIFFERENT THINGS: MORE THAN HALF THE DAYS
IF YOU CHECKED OFF ANY PROBLEMS ON THIS QUESTIONNAIRE, HOW DIFFICULT HAVE THESE PROBLEMS MADE IT FOR YOU TO DO YOUR WORK, TAKE CARE OF THINGS AT HOME, OR GET ALONG WITH OTHER PEOPLE: VERY DIFFICULT
6. BECOMING EASILY ANNOYED OR IRRITABLE: MORE THAN HALF THE DAYS
2. NOT BEING ABLE TO STOP OR CONTROL WORRYING: MORE THAN HALF THE DAYS
GAD7 TOTAL SCORE: 16

## 2024-04-16 NOTE — PROGRESS NOTES
Chief Complaint   Patient presents with    Exposure to STD     \"Have you been to the ER, urgent care clinic since your last visit?  Hospitalized since your last visit?\"    NO    “Have you seen or consulted any other health care providers outside of [facility] since your last visit?”    NO            Click Here for Release of Records Request  HIPPA confirmed by two patient identifiers.

## 2024-04-16 NOTE — PROGRESS NOTES
Mykel Cantrell, was evaluated through a synchronous (real-time) audio-video encounter. The patient (or guardian if applicable) is aware that this is a billable service, which includes applicable co-pays. This Virtual Visit was conducted with patient's (and/or legal guardian's) consent. Patient identification was verified, and a caregiver was present when appropriate.   The patient was located at Home: [address] [facility] [address]  Provider was located at Home [place]Appt [place] State[place]  Confirm you are appropriately licensed, registered, or certified to deliver care in the state where the patient is located as indicated above. If you are not or unsure, please re-schedule the visit: Yes, I confirm.     Mykel Cantrell (:  1995) is a Established patient, presenting virtually for evaluation of the following:    Assessment & Plan   Below is the assessment and plan developed based on review of pertinent history, physical exam, labs, studies, and medications.    Fu in person soon    1. Screening examination for STD (sexually transmitted disease)  -     HIV 1/2 Ag/Ab, 4TH Generation,W Rflx Confirm; Future  -     T Pallidum Screen W/Reflex; Future  -     Hepatitis C Ab, Rflx to Qt by PCR; Future  -     Chlamydia, Gonorrhea, Trichomoniasis; Future  2. Dermatitis  -     Comprehensive Metabolic Panel; Future  -     CBC; Future  3. History of breast cancer  -     Comprehensive Metabolic Panel; Future  -     CBC; Future    No follow-ups on file.       Subjective   HPI  Review of Systems    STD testing:   Routine no s/s    Rash: top of lip and neck is breaking out and face  Video quality poor     Phq-9 denies need for abilify  Or urgent needs       Objective   Patient-Reported Vitals  No data recorded     Physical Exam  [INSTRUCTIONS:  \"[x]\" Indicates a positive item  \"[]\" Indicates a negative item  -- DELETE ALL ITEMS NOT EXAMINED]    Constitutional: [x] Appears well-developed and well-nourished [x] No apparent

## 2024-11-15 ENCOUNTER — APPOINTMENT (OUTPATIENT)
Facility: HOSPITAL | Age: 29
End: 2024-11-15
Payer: COMMERCIAL

## 2024-11-15 ENCOUNTER — HOSPITAL ENCOUNTER (OUTPATIENT)
Facility: HOSPITAL | Age: 29
Setting detail: OBSERVATION
Discharge: LEFT BEFORE DISCHARGE | End: 2024-11-19
Attending: STUDENT IN AN ORGANIZED HEALTH CARE EDUCATION/TRAINING PROGRAM
Payer: COMMERCIAL

## 2024-11-15 DIAGNOSIS — T50.904A DRUG OVERDOSE OF UNDETERMINED INTENT, INITIAL ENCOUNTER: Primary | ICD-10-CM

## 2024-11-15 PROCEDURE — 80179 DRUG ASSAY SALICYLATE: CPT

## 2024-11-15 PROCEDURE — 96372 THER/PROPH/DIAG INJ SC/IM: CPT

## 2024-11-15 PROCEDURE — 99285 EMERGENCY DEPT VISIT HI MDM: CPT

## 2024-11-15 PROCEDURE — 85025 COMPLETE CBC W/AUTO DIFF WBC: CPT

## 2024-11-15 PROCEDURE — 74018 RADEX ABDOMEN 1 VIEW: CPT

## 2024-11-15 PROCEDURE — 80143 DRUG ASSAY ACETAMINOPHEN: CPT

## 2024-11-15 PROCEDURE — 6360000002 HC RX W HCPCS: Performed by: PHYSICIAN ASSISTANT

## 2024-11-15 PROCEDURE — 83735 ASSAY OF MAGNESIUM: CPT

## 2024-11-15 PROCEDURE — 82077 ASSAY SPEC XCP UR&BREATH IA: CPT

## 2024-11-15 PROCEDURE — 36415 COLL VENOUS BLD VENIPUNCTURE: CPT

## 2024-11-15 PROCEDURE — 80053 COMPREHEN METABOLIC PANEL: CPT

## 2024-11-15 RX ORDER — HALOPERIDOL 5 MG/ML
5 INJECTION INTRAMUSCULAR ONCE
Status: COMPLETED | OUTPATIENT
Start: 2024-11-15 | End: 2024-11-15

## 2024-11-15 RX ORDER — LORAZEPAM 2 MG/ML
1 INJECTION INTRAMUSCULAR ONCE
Status: DISCONTINUED | OUTPATIENT
Start: 2024-11-15 | End: 2024-11-15

## 2024-11-15 RX ORDER — LORAZEPAM 2 MG/ML
2 INJECTION INTRAMUSCULAR ONCE
Status: DISCONTINUED | OUTPATIENT
Start: 2024-11-15 | End: 2024-11-19 | Stop reason: HOSPADM

## 2024-11-15 RX ORDER — HALOPERIDOL 5 MG/ML
5 INJECTION INTRAMUSCULAR EVERY 6 HOURS PRN
Status: DISCONTINUED | OUTPATIENT
Start: 2024-11-15 | End: 2024-11-15

## 2024-11-15 RX ORDER — LORAZEPAM 2 MG/ML
2 INJECTION INTRAMUSCULAR ONCE
Status: COMPLETED | OUTPATIENT
Start: 2024-11-15 | End: 2024-11-15

## 2024-11-15 RX ADMIN — LORAZEPAM 2 MG: 2 INJECTION INTRAMUSCULAR; INTRAVENOUS at 21:38

## 2024-11-15 RX ADMIN — HALOPERIDOL LACTATE 5 MG: 5 INJECTION, SOLUTION INTRAMUSCULAR at 21:38

## 2024-11-16 PROBLEM — T18.9XXA INGESTION OF FOREIGN BODY, INITIAL ENCOUNTER: Status: ACTIVE | Noted: 2024-11-16

## 2024-11-16 LAB
ALBUMIN SERPL-MCNC: 3.8 G/DL (ref 3.5–5)
ALBUMIN/GLOB SERPL: 1.1 (ref 1.1–2.2)
ALP SERPL-CCNC: 57 U/L (ref 45–117)
ALT SERPL-CCNC: 13 U/L (ref 12–78)
ANION GAP SERPL CALC-SCNC: 7 MMOL/L (ref 2–12)
APAP SERPL-MCNC: <2 UG/ML (ref 10–30)
AST SERPL W P-5'-P-CCNC: 19 U/L (ref 15–37)
BASOPHILS # BLD: 0 K/UL (ref 0–0.1)
BASOPHILS NFR BLD: 0 % (ref 0–1)
BILIRUB SERPL-MCNC: 0.5 MG/DL (ref 0.2–1)
BUN SERPL-MCNC: 14 MG/DL (ref 6–20)
BUN/CREAT SERPL: 13 (ref 12–20)
CA-I BLD-MCNC: 8.5 MG/DL (ref 8.5–10.1)
CHLORIDE SERPL-SCNC: 107 MMOL/L (ref 97–108)
CO2 SERPL-SCNC: 27 MMOL/L (ref 21–32)
CREAT SERPL-MCNC: 1.05 MG/DL (ref 0.55–1.02)
DATE LAST DOSE: ABNORMAL
DATE LAST DOSE: ABNORMAL
DIFFERENTIAL METHOD BLD: ABNORMAL
DOSE AMOUNT: ABNORMAL UNITS
DOSE AMOUNT: ABNORMAL UNITS
EKG ATRIAL RATE: 86 BPM
EKG DIAGNOSIS: NORMAL
EKG P AXIS: 116 DEGREES
EKG P-R INTERVAL: 132 MS
EKG Q-T INTERVAL: 406 MS
EKG QRS DURATION: 76 MS
EKG QTC CALCULATION (BAZETT): 485 MS
EKG R AXIS: 105 DEGREES
EKG T AXIS: 48 DEGREES
EKG VENTRICULAR RATE: 86 BPM
EOSINOPHIL # BLD: 0 K/UL (ref 0–0.4)
EOSINOPHIL NFR BLD: 0 % (ref 0–7)
ERYTHROCYTE [DISTWIDTH] IN BLOOD BY AUTOMATED COUNT: 15.7 % (ref 11.5–14.5)
ETHANOL SERPL-MCNC: <10 MG/DL (ref 0–0.08)
GLOBULIN SER CALC-MCNC: 3.5 G/DL (ref 2–4)
GLUCOSE SERPL-MCNC: 85 MG/DL (ref 65–100)
HCT VFR BLD AUTO: 27.9 % (ref 35–47)
HGB BLD-MCNC: 9.1 G/DL (ref 11.5–16)
IMM GRANULOCYTES # BLD AUTO: 0 K/UL (ref 0–0.04)
IMM GRANULOCYTES NFR BLD AUTO: 0 % (ref 0–0.5)
LYMPHOCYTES # BLD: 1.5 K/UL (ref 0.8–3.5)
LYMPHOCYTES NFR BLD: 11 % (ref 12–49)
MAGNESIUM SERPL-MCNC: 1.8 MG/DL (ref 1.6–2.4)
MCH RBC QN AUTO: 22.6 PG (ref 26–34)
MCHC RBC AUTO-ENTMCNC: 32.6 G/DL (ref 30–36.5)
MCV RBC AUTO: 69.2 FL (ref 80–99)
MONOCYTES # BLD: 0.8 K/UL (ref 0–1)
MONOCYTES NFR BLD: 6 % (ref 5–13)
NEUTS SEG # BLD: 11.3 K/UL (ref 1.8–8)
NEUTS SEG NFR BLD: 83 % (ref 32–75)
NRBC # BLD: 0 K/UL (ref 0–0.01)
NRBC BLD-RTO: 0 PER 100 WBC
PLATELET # BLD AUTO: 146 K/UL (ref 150–400)
POTASSIUM SERPL-SCNC: 3.6 MMOL/L (ref 3.5–5.1)
PROT SERPL-MCNC: 7.3 G/DL (ref 6.4–8.2)
RBC # BLD AUTO: 4.03 M/UL (ref 3.8–5.2)
RBC MORPH BLD: ABNORMAL
RBC MORPH BLD: ABNORMAL
SALICYLATES SERPL-MCNC: <1.7 MG/DL (ref 2.8–20)
SODIUM SERPL-SCNC: 141 MMOL/L (ref 136–145)
WBC # BLD AUTO: 13.6 K/UL (ref 3.6–11)

## 2024-11-16 PROCEDURE — G0378 HOSPITAL OBSERVATION PER HR: HCPCS

## 2024-11-16 PROCEDURE — 93005 ELECTROCARDIOGRAM TRACING: CPT | Performed by: STUDENT IN AN ORGANIZED HEALTH CARE EDUCATION/TRAINING PROGRAM

## 2024-11-16 RX ORDER — HALOPERIDOL 5 MG/ML
5 INJECTION INTRAMUSCULAR EVERY 6 HOURS PRN
Status: DISCONTINUED | OUTPATIENT
Start: 2024-11-16 | End: 2024-11-19 | Stop reason: HOSPADM

## 2024-11-16 RX ORDER — BACITRACIN ZINC 500 [USP'U]/G
OINTMENT TOPICAL ONCE
Status: DISCONTINUED | OUTPATIENT
Start: 2024-11-16 | End: 2024-11-19 | Stop reason: HOSPADM

## 2024-11-16 RX ORDER — ACETAMINOPHEN 325 MG/1
650 TABLET ORAL EVERY 6 HOURS PRN
Status: DISCONTINUED | OUTPATIENT
Start: 2024-11-16 | End: 2024-11-19 | Stop reason: HOSPADM

## 2024-11-16 RX ORDER — LORAZEPAM 2 MG/ML
1 INJECTION INTRAMUSCULAR EVERY 6 HOURS PRN
Status: DISCONTINUED | OUTPATIENT
Start: 2024-11-16 | End: 2024-11-19 | Stop reason: HOSPADM

## 2024-11-16 RX ORDER — SODIUM CHLORIDE 9 MG/ML
INJECTION, SOLUTION INTRAVENOUS CONTINUOUS
Status: DISPENSED | OUTPATIENT
Start: 2024-11-16 | End: 2024-11-16

## 2024-11-16 RX ORDER — ONDANSETRON 2 MG/ML
4 INJECTION INTRAMUSCULAR; INTRAVENOUS EVERY 6 HOURS PRN
Status: DISCONTINUED | OUTPATIENT
Start: 2024-11-16 | End: 2024-11-19 | Stop reason: HOSPADM

## 2024-11-16 RX ORDER — SODIUM CHLORIDE 9 MG/ML
INJECTION, SOLUTION INTRAVENOUS PRN
Status: DISCONTINUED | OUTPATIENT
Start: 2024-11-16 | End: 2024-11-19 | Stop reason: HOSPADM

## 2024-11-16 RX ORDER — POLYETHYLENE GLYCOL 3350 17 G/17G
17 POWDER, FOR SOLUTION ORAL DAILY PRN
Status: DISCONTINUED | OUTPATIENT
Start: 2024-11-16 | End: 2024-11-19 | Stop reason: HOSPADM

## 2024-11-16 RX ORDER — SODIUM CHLORIDE 0.9 % (FLUSH) 0.9 %
5-40 SYRINGE (ML) INJECTION EVERY 12 HOURS SCHEDULED
Status: DISCONTINUED | OUTPATIENT
Start: 2024-11-16 | End: 2024-11-19 | Stop reason: HOSPADM

## 2024-11-16 RX ORDER — SODIUM CHLORIDE 0.9 % (FLUSH) 0.9 %
5-40 SYRINGE (ML) INJECTION PRN
Status: DISCONTINUED | OUTPATIENT
Start: 2024-11-16 | End: 2024-11-19 | Stop reason: HOSPADM

## 2024-11-16 RX ORDER — POTASSIUM CHLORIDE 7.45 MG/ML
10 INJECTION INTRAVENOUS PRN
Status: DISCONTINUED | OUTPATIENT
Start: 2024-11-16 | End: 2024-11-19 | Stop reason: HOSPADM

## 2024-11-16 RX ORDER — POTASSIUM CHLORIDE 1500 MG/1
40 TABLET, EXTENDED RELEASE ORAL PRN
Status: DISCONTINUED | OUTPATIENT
Start: 2024-11-16 | End: 2024-11-19 | Stop reason: HOSPADM

## 2024-11-16 RX ORDER — MAGNESIUM SULFATE IN WATER 40 MG/ML
2000 INJECTION, SOLUTION INTRAVENOUS PRN
Status: DISCONTINUED | OUTPATIENT
Start: 2024-11-16 | End: 2024-11-19 | Stop reason: HOSPADM

## 2024-11-16 RX ORDER — ACETAMINOPHEN 650 MG/1
650 SUPPOSITORY RECTAL EVERY 6 HOURS PRN
Status: DISCONTINUED | OUTPATIENT
Start: 2024-11-16 | End: 2024-11-19 | Stop reason: HOSPADM

## 2024-11-16 RX ORDER — ONDANSETRON 4 MG/1
4 TABLET, ORALLY DISINTEGRATING ORAL EVERY 8 HOURS PRN
Status: DISCONTINUED | OUTPATIENT
Start: 2024-11-16 | End: 2024-11-19 | Stop reason: HOSPADM

## 2024-11-16 NOTE — BSMART NOTE
Per Jasmin with [facility], pt is being released from Psych [facility] due to medical admission. Prescreen and addendum added to chart.    Per Addendum:    Client was prescreened on 11-15-24 and TDO recommended. Client was medically admitted to prior to TDO being issued. ECO was released at that time. Prior to discharge from the medical unit [facility] should be contacted to re-evaluate client for TDO criteria.
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
## 2024-11-16 NOTE — ED PROVIDER NOTES
ED Course as of 11/15/24 2338   Fri Nov 15, 2024   2155 Patient is a young female who comes to the ED with drug overdose.  Per police report that they are concerned the patient may have consumed symptomatic crack.  She also reportedly has ingested a pipe.  Patient is not drooling at all.  She is tachycardic due to agitation.  Otherwise I did not appreciate evidence of acute abdomen.  Poison control was engaged and will get labs as well as EKG and x-ray of the abdomen.  Symptomatic treatment with benzodiazepines as needed. [AA]   2216 Poison control recommended activated charcoal as well. [AA]   2307 I spoke to Dr. Raphael.  He is coming to observation for the foreign body.  Pending labs [AA]      ED Course User Index  [AA] Brittany Zurita MD       Patient was given the following medications:  Medications   charcoal activated liquid 50 g (has no administration in time range)   LORazepam (ATIVAN) injection 2 mg (has no administration in time range)   LORazepam (ATIVAN) injection 2 mg (2 mg IntraMUSCular Given 11/15/24 2138)   haloperidol lactate (HALDOL) injection 5 mg (5 mg IntraMUSCular Given 11/15/24 2138)       ED IMPRESSION     1. Drug overdose of undetermined intent, initial encounter          DISPOSITION/PLAN   DISPOSITION Behavioral Health Hold 11/15/2024 11:37:31 PM         Pending  or  disposition.       I am the Primary Clinician of Record. Brittany Zurita MD (electronically signed)    (Please note that parts of this dictation were completed with voice recognition software. Quite often unanticipated grammatical, syntax, homophones, and other interpretive errors are inadvertently transcribed by the computer software. Please disregards these errors. Please excuse any errors that have escaped final proofreading.)     Brittany Zurita MD  11/15/24 9549

## 2024-11-16 NOTE — H&P
11/15/24 11:41 PM   Result Value Ref Range    WBC 13.6 (H) 4.1 - 11.1 K/uL    RBC 4.03 (L) 4.10 - 5.70 M/uL    Hemoglobin 9.1 (L) 12.1 - 17.0 g/dL    Hematocrit 27.9 (L) 36.6 - 50.3 %    MCV 69.2 (L) 80.0 - 99.0 FL    MCH 22.6 (L) 26.0 - 34.0 PG    MCHC 32.6 30.0 - 36.5 g/dL    RDW 15.7 (H) 11.5 - 14.5 %    Platelets 146 (L) 150 - 400 K/uL    Nucleated RBCs 0.0 0.0  WBC    nRBC 0.00 0.00 - 0.01 K/uL    Neutrophils % 83 (H) 32 - 75 %    Lymphocytes % 11 (L) 12 - 49 %    Monocytes % 6 5 - 13 %    Eosinophils % 0 0 - 7 %    Basophils % 0 0 - 1 %    Immature Granulocytes % 0 0 - 0.5 %    Neutrophils Absolute 11.3 (H) 1.8 - 8.0 K/UL    Lymphocytes Absolute 1.5 0.8 - 3.5 K/UL    Monocytes Absolute 0.8 0.0 - 1.0 K/UL    Eosinophils Absolute 0.0 0.0 - 0.4 K/UL    Basophils Absolute 0.0 0.0 - 0.1 K/UL    Immature Granulocytes Absolute 0.0 0.00 - 0.04 K/UL    Differential Type Smear Scanned      RBC Comment Microcytosis  2+        RBC Comment Hypochromia  2+       CMP    Collection Time: 11/15/24 11:41 PM   Result Value Ref Range    Sodium 141 136 - 145 mmol/L    Potassium 3.6 3.5 - 5.1 mmol/L    Chloride 107 97 - 108 mmol/L    CO2 27 21 - 32 mmol/L    Anion Gap 7 2 - 12 mmol/L    Glucose 85 65 - 100 mg/dL    BUN 14 6 - 20 mg/dL    Creatinine 1.05 0.70 - 1.30 mg/dL    BUN/Creatinine Ratio 13 12 - 20      Est, Glom Filt Rate Invalid >60 ml/min/1.73m2    Calcium 8.5 8.5 - 10.1 mg/dL    Total Bilirubin 0.5 0.2 - 1.0 mg/dL    AST 19 15 - 37 U/L    ALT 13 12 - 78 U/L    Alk Phosphatase 57 45 - 117 U/L    Total Protein 7.3 6.4 - 8.2 g/dL    Albumin 3.8 3.5 - 5.0 g/dL    Globulin 3.5 2.0 - 4.0 g/dL    Albumin/Globulin Ratio 1.1 1.1 - 2.2     Salicylate    Collection Time: 11/15/24 11:41 PM   Result Value Ref Range    Salicylate Lvl <1.7 (L) 2.8 - 20.0 mg/dL    DOSE DATE Not provided      DOSE AMOUNT Not provided Units   Acetaminophen Level    Collection Time: 11/15/24 11:41 PM   Result Value Ref Range    Acetaminophen Level

## 2024-11-16 NOTE — BSMART NOTE
This writer went in to introduce herself to pt. Pt smiled but refused to provide her name. Pt then stated \"I dont want to talk to you\". This writer acknowledged patients request and informed her that she would have to speak with [employer] as a part of her ECO process. Pt was receptive but informed this writer that she would not speak with them either.    This writer set up Zoom call for pt to be evaluated with Jasmin by [facility]. Pt refusing to speak with her. Jasmin with [employer] asked to be contacted if pt is more willing to participate in assessment over the next few hours.

## 2024-11-17 ENCOUNTER — APPOINTMENT (OUTPATIENT)
Facility: HOSPITAL | Age: 29
End: 2024-11-17
Payer: COMMERCIAL

## 2024-11-17 LAB
ALBUMIN SERPL-MCNC: 3.6 G/DL (ref 3.5–5)
ALBUMIN/GLOB SERPL: 1 (ref 1.1–2.2)
ALP SERPL-CCNC: 62 U/L (ref 45–117)
ALT SERPL-CCNC: 11 U/L (ref 12–78)
ANION GAP SERPL CALC-SCNC: 12 MMOL/L (ref 2–12)
AST SERPL W P-5'-P-CCNC: 12 U/L (ref 15–37)
BASOPHILS # BLD: 0 K/UL (ref 0–0.1)
BASOPHILS NFR BLD: 0 % (ref 0–1)
BILIRUB SERPL-MCNC: 0.8 MG/DL (ref 0.2–1)
BUN SERPL-MCNC: 15 MG/DL (ref 6–20)
BUN/CREAT SERPL: 19 (ref 12–20)
CA-I BLD-MCNC: 8.4 MG/DL (ref 8.5–10.1)
CHLORIDE SERPL-SCNC: 108 MMOL/L (ref 97–108)
CO2 SERPL-SCNC: 20 MMOL/L (ref 21–32)
CREAT SERPL-MCNC: 0.79 MG/DL (ref 0.55–1.02)
DIFFERENTIAL METHOD BLD: ABNORMAL
EOSINOPHIL # BLD: 0.1 K/UL (ref 0–0.4)
EOSINOPHIL NFR BLD: 2 % (ref 0–7)
ERYTHROCYTE [DISTWIDTH] IN BLOOD BY AUTOMATED COUNT: 16.1 % (ref 11.5–14.5)
GLOBULIN SER CALC-MCNC: 3.5 G/DL (ref 2–4)
GLUCOSE BLD STRIP.AUTO-MCNC: 104 MG/DL (ref 65–100)
GLUCOSE SERPL-MCNC: 50 MG/DL (ref 65–100)
HCT VFR BLD AUTO: 32.8 % (ref 35–47)
HGB BLD-MCNC: 10.5 G/DL (ref 11.5–16)
IMM GRANULOCYTES # BLD AUTO: 0 K/UL (ref 0–0.04)
IMM GRANULOCYTES NFR BLD AUTO: 1 % (ref 0–0.5)
LYMPHOCYTES # BLD: 1.2 K/UL (ref 0.8–3.5)
LYMPHOCYTES NFR BLD: 15 % (ref 12–49)
MCH RBC QN AUTO: 22.7 PG (ref 26–34)
MCHC RBC AUTO-ENTMCNC: 32 G/DL (ref 30–36.5)
MCV RBC AUTO: 70.8 FL (ref 80–99)
MONOCYTES # BLD: 0.5 K/UL (ref 0–1)
MONOCYTES NFR BLD: 7 % (ref 5–13)
NEUTS SEG # BLD: 6 K/UL (ref 1.8–8)
NEUTS SEG NFR BLD: 75 % (ref 32–75)
NRBC # BLD: 0 K/UL (ref 0–0.01)
NRBC BLD-RTO: 0 PER 100 WBC
PERFORMED BY:: ABNORMAL
PLATELET # BLD AUTO: 143 K/UL (ref 150–400)
POTASSIUM SERPL-SCNC: 3.8 MMOL/L (ref 3.5–5.1)
PROT SERPL-MCNC: 7.1 G/DL (ref 6.4–8.2)
RBC # BLD AUTO: 4.63 M/UL (ref 3.8–5.2)
SODIUM SERPL-SCNC: 140 MMOL/L (ref 136–145)
WBC # BLD AUTO: 7.9 K/UL (ref 3.6–11)

## 2024-11-17 PROCEDURE — G0378 HOSPITAL OBSERVATION PER HR: HCPCS

## 2024-11-17 PROCEDURE — 80053 COMPREHEN METABOLIC PANEL: CPT

## 2024-11-17 PROCEDURE — 36415 COLL VENOUS BLD VENIPUNCTURE: CPT

## 2024-11-17 PROCEDURE — 85025 COMPLETE CBC W/AUTO DIFF WBC: CPT

## 2024-11-17 PROCEDURE — 74018 RADEX ABDOMEN 1 VIEW: CPT

## 2024-11-17 PROCEDURE — 6370000000 HC RX 637 (ALT 250 FOR IP): Performed by: STUDENT IN AN ORGANIZED HEALTH CARE EDUCATION/TRAINING PROGRAM

## 2024-11-17 PROCEDURE — 82962 GLUCOSE BLOOD TEST: CPT

## 2024-11-17 RX ORDER — SODIUM BICARBONATE 650 MG/1
650 TABLET ORAL ONCE
Status: COMPLETED | OUTPATIENT
Start: 2024-11-17 | End: 2024-11-17

## 2024-11-17 RX ADMIN — SODIUM BICARBONATE 650 MG: 650 TABLET ORAL at 12:49

## 2024-11-17 NOTE — PLAN OF CARE
Problem: Discharge Planning  Goal: Discharge to home or other facility with appropriate resources  Outcome: Not Progressing       Problem: Safety - Violent/Self-destructive Restraint  Goal: Remains free of injury from restraints (Restraint for Violent/Self-Destructive Behavior)  Description: INTERVENTIONS:  1. Determine that de-escalation and other, less restrictive measures have been tried or would not be effective before applying the restraint  2. Identify and document the criteria for restraint  3. Evaluate the patient's condition at the time of restraint application  4. Inform patient/family regarding the reason for restraint/seclusion  5. Q2H: Monitor comfort, nutrition and hydration needs  6. Q15M: Perform safety checks including skin, circulation, sensory, respiratory and psychological status  7. Ensure continuous observation  8. Identify and implement measures to help patient regain control, assess readiness for release and initiate progressive release per policy  Outcome: Progressing     Problem: Risk for Elopement  Goal: Patient will not exit the unit/facility without proper excort  Outcome: Progressing

## 2024-11-17 NOTE — CARE COORDINATION
11/17/24 1409   Service Assessment   Patient Orientation Alert and Oriented   Cognition Alert   History Provided By Patient   Ability to make needs known: Good   Financial Resources   ([employer])     Brief OBS assess.    Met with the pt under pseudonym.  Pt provided Cony Silvestre as name and provided birthdate.   Stated address as [address]  [place] [id number] Claimed ind with all adls and iadls.  Denied DME.  Declined any additional conversation.  Advance Care Planning     General Advance Care Planning (ACP) Conversation    Date of Conversation: 11/17/2024  Conducted with: Patient with Decision Making Capacity  Other persons present: None    Healthcare Decision Maker: No healthcare decision makers have been documented.       Content/Action Overview:  Has ACP document(s) on file - reflects the patient's care preferences  Reviewed DNR/DNI and patient elects Full Code (Attempt Resuscitation)        Length of Voluntary ACP Conversation in minutes:  <16 minutes (Non-Billable)    Gwen Goddard RN

## 2024-11-18 LAB
BASOPHILS # BLD: 0 K/UL (ref 0–0.1)
BASOPHILS NFR BLD: 0 % (ref 0–1)
DIFFERENTIAL METHOD BLD: ABNORMAL
EOSINOPHIL # BLD: 0.3 K/UL (ref 0–0.4)
EOSINOPHIL NFR BLD: 4 % (ref 0–7)
ERYTHROCYTE [DISTWIDTH] IN BLOOD BY AUTOMATED COUNT: 15.7 % (ref 11.5–14.5)
GLUCOSE BLD STRIP.AUTO-MCNC: 111 MG/DL (ref 65–100)
HCT VFR BLD AUTO: 30.4 % (ref 35–47)
HGB BLD-MCNC: 9.6 G/DL (ref 11.5–16)
IMM GRANULOCYTES # BLD AUTO: 0 K/UL (ref 0–0.04)
IMM GRANULOCYTES NFR BLD AUTO: 0 % (ref 0–0.5)
LYMPHOCYTES # BLD: 2 K/UL (ref 0.8–3.5)
LYMPHOCYTES NFR BLD: 24 % (ref 12–49)
MCH RBC QN AUTO: 22.1 PG (ref 26–34)
MCHC RBC AUTO-ENTMCNC: 31.6 G/DL (ref 30–36.5)
MCV RBC AUTO: 70 FL (ref 80–99)
MONOCYTES # BLD: 0.8 K/UL (ref 0–1)
MONOCYTES NFR BLD: 9 % (ref 5–13)
NEUTS SEG # BLD: 5.3 K/UL (ref 1.8–8)
NEUTS SEG NFR BLD: 63 % (ref 32–75)
NRBC # BLD: 0 K/UL (ref 0–0.01)
NRBC BLD-RTO: 0 PER 100 WBC
PERFORMED BY:: ABNORMAL
PLATELET # BLD AUTO: 169 K/UL (ref 150–400)
RBC # BLD AUTO: 4.34 M/UL (ref 3.8–5.2)
WBC # BLD AUTO: 8.4 K/UL (ref 3.6–11)

## 2024-11-18 PROCEDURE — 36415 COLL VENOUS BLD VENIPUNCTURE: CPT

## 2024-11-18 PROCEDURE — 82962 GLUCOSE BLOOD TEST: CPT

## 2024-11-18 PROCEDURE — 85025 COMPLETE CBC W/AUTO DIFF WBC: CPT

## 2024-11-18 PROCEDURE — G0378 HOSPITAL OBSERVATION PER HR: HCPCS

## 2024-11-18 NOTE — PLAN OF CARE
Problem: Discharge Planning  Goal: Discharge to home or other facility with appropriate resources  11/18/2024 0020 by Matt Kyle RN  Outcome: Not Progressing  Flowsheets (Taken 11/17/2024 2040)  Discharge to home or other facility with appropriate resources:   Identify barriers to discharge with patient and caregiver   Identify discharge learning needs (meds, wound care, etc)     Problem: Discharge Planning  Goal: Discharge to home or other facility with appropriate resources  11/18/2024 0020 by Matt Kyle RN  Outcome: Not Progressing  Flowsheets (Taken 11/17/2024 2040)  Discharge to home or other facility with appropriate resources:   Identify barriers to discharge with patient and caregiver   Identify discharge learning needs (meds, wound care, etc)

## 2024-11-18 NOTE — WOUND CARE
Wound Care Note:    Wound Care into see patient because of laceration to RLA anterior    Skin Care & Pressure Relief Recommendations:  Minimize layers of linen  Pads under patient to optimize support surface and microclimate  Turn/reposition approximately every 2 hours.  Pillow Wedges  Manage incontinence  Promote continence; Skin Protective lotion to buttocks and sacrum daily and as needed with incontinence care    Offload heels with pillows or offloading boots.    Support surface: Gel    Deep partial-thickness laceration to RLA anterior.  Scant serosanguineous drainage.  Superficial lacerations to periwound.    -For RLA anterior: irrigate with saline, apply xeroform gauze, and cover with a lite foam daily and prn for soiling.  Secure foam with tape if needed.  Avoid applying roll gauze r/t SI.      Patient is independent in care.  No other wound/skin care concerns noted at this time.  Re-consult [facility] for other skin/wound care concerns.
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
## 2024-11-18 NOTE — PLAN OF CARE
Problem: Safety - Violent/Self-destructive Restraint  Goal: Remains free of injury from restraints (Restraint for Violent/Self-Destructive Behavior)  Description: INTERVENTIONS:  1. Determine that de-escalation and other, less restrictive measures have been tried or would not be effective before applying the restraint  2. Identify and document the criteria for restraint  3. Evaluate the patient's condition at the time of restraint application  4. Inform patient/family regarding the reason for restraint/seclusion  5. Q2H: Monitor comfort, nutrition and hydration needs  6. Q15M: Perform safety checks including skin, circulation, sensory, respiratory and psychological status  7. Ensure continuous observation  8. Identify and implement measures to help patient regain control, assess readiness for release and initiate progressive release per policy  Outcome: Progressing     Problem: Risk for Elopement  Goal: Patient will not exit the unit/facility without proper excort  Outcome: Progressing  Flowsheets (Taken 11/17/2024 2040)  Nursing Interventions for Elopement Risk:   Communicate/escalate to charge nurse the risk of elopement   Communicate to physician the risk for elopement   Make sure patient has all necessary personal care items   Shoes and clothing collected and placed in gown attire     Problem: Pain  Goal: Verbalizes/displays adequate comfort level or baseline comfort level  Outcome: Progressing     Problem: Discharge Planning  Goal: Discharge to home or other facility with appropriate resources  Outcome: Not Progressing  Flowsheets (Taken 11/17/2024 2040)  Discharge to home or other facility with appropriate resources:   Identify barriers to discharge with patient and caregiver   Identify discharge learning needs (meds, wound care, etc)

## 2024-11-19 VITALS
BODY MASS INDEX: 25.9 KG/M2 | TEMPERATURE: 98 F | HEIGHT: 67 IN | WEIGHT: 165 LBS | OXYGEN SATURATION: 100 % | SYSTOLIC BLOOD PRESSURE: 101 MMHG | RESPIRATION RATE: 18 BRPM | DIASTOLIC BLOOD PRESSURE: 71 MMHG | HEART RATE: 83 BPM

## 2024-11-19 LAB
BASOPHILS # BLD: 0 K/UL (ref 0–0.1)
BASOPHILS NFR BLD: 1 % (ref 0–1)
DIFFERENTIAL METHOD BLD: ABNORMAL
EOSINOPHIL # BLD: 0.4 K/UL (ref 0–0.4)
EOSINOPHIL NFR BLD: 5 % (ref 0–7)
ERYTHROCYTE [DISTWIDTH] IN BLOOD BY AUTOMATED COUNT: 15.7 % (ref 11.5–14.5)
HCT VFR BLD AUTO: 31.4 % (ref 35–47)
HGB BLD-MCNC: 10.2 G/DL (ref 11.5–16)
IMM GRANULOCYTES # BLD AUTO: 0 K/UL (ref 0–0.04)
IMM GRANULOCYTES NFR BLD AUTO: 0 % (ref 0–0.5)
LYMPHOCYTES # BLD: 1.7 K/UL (ref 0.8–3.5)
LYMPHOCYTES NFR BLD: 25 % (ref 12–49)
MCH RBC QN AUTO: 22.9 PG (ref 26–34)
MCHC RBC AUTO-ENTMCNC: 32.5 G/DL (ref 30–36.5)
MCV RBC AUTO: 70.4 FL (ref 80–99)
MONOCYTES # BLD: 0.6 K/UL (ref 0–1)
MONOCYTES NFR BLD: 8 % (ref 5–13)
NEUTS SEG # BLD: 4 K/UL (ref 1.8–8)
NEUTS SEG NFR BLD: 61 % (ref 32–75)
NRBC # BLD: 0 K/UL (ref 0–0.01)
NRBC BLD-RTO: 0 PER 100 WBC
PLATELET # BLD AUTO: 166 K/UL (ref 150–400)
RBC # BLD AUTO: 4.46 M/UL (ref 3.8–5.2)
WBC # BLD AUTO: 6.7 K/UL (ref 3.6–11)

## 2024-11-19 PROCEDURE — 36415 COLL VENOUS BLD VENIPUNCTURE: CPT

## 2024-11-19 PROCEDURE — G0378 HOSPITAL OBSERVATION PER HR: HCPCS

## 2024-11-19 PROCEDURE — 85025 COMPLETE CBC W/AUTO DIFF WBC: CPT
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
## 2024-11-19 NOTE — PLAN OF CARE
Problem: Safety - Violent/Self-destructive Restraint  Goal: Remains free of injury from restraints (Restraint for Violent/Self-Destructive Behavior)  Description: INTERVENTIONS:  1. Determine that de-escalation and other, less restrictive measures have been tried or would not be effective before applying the restraint  2. Identify and document the criteria for restraint  3. Evaluate the patient's condition at the time of restraint application  4. Inform patient/family regarding the reason for restraint/seclusion  5. Q2H: Monitor comfort, nutrition and hydration needs  6. Q15M: Perform safety checks including skin, circulation, sensory, respiratory and psychological status  7. Ensure continuous observation  8. Identify and implement measures to help patient regain control, assess readiness for release and initiate progressive release per policy  11/19/2024 0908 by Lena Henson RN  Outcome: Progressing  11/19/2024 0053 by Ameena Slade RN  Outcome: Progressing     Problem: Discharge Planning  Goal: Discharge to home or other facility with appropriate resources  11/19/2024 0908 by Lena Henson RN  Outcome: Progressing  Flowsheets (Taken 11/19/2024 0734)  Discharge to home or other facility with appropriate resources:   Identify barriers to discharge with patient and caregiver   Arrange for needed discharge resources and transportation as appropriate   Arrange for interpreters to assist at discharge as needed  11/19/2024 0053 by Ameena Slade RN  Outcome: Progressing     Problem: Risk for Elopement  Goal: Patient will not exit the unit/facility without proper excort  11/19/2024 0908 by Lena Henson RN  Outcome: Progressing  Flowsheets (Taken 11/19/2024 0905)  Nursing Interventions for Elopement Risk:   Collaborate with treatment team for nicotine replacement   Communicate/escalate to charge nurse the risk of elopement   Communicate/escalate to /other team member the
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
## 2024-11-19 NOTE — CARE COORDINATION
CM reviewed chart. Followed by GI and psychiatry    Pending possible transfer to [facility]    CM will continue to follow

## 2025-01-23 ENCOUNTER — TELEMEDICINE (OUTPATIENT)
Facility: CLINIC | Age: 30
End: 2025-01-23
Payer: COMMERCIAL

## 2025-01-23 DIAGNOSIS — R07.89 CHEST WALL PAIN: Primary | ICD-10-CM

## 2025-01-23 DIAGNOSIS — Z85.3 HISTORY OF BREAST CANCER: ICD-10-CM

## 2025-01-23 PROCEDURE — 99213 OFFICE O/P EST LOW 20 MIN: CPT | Performed by: NURSE PRACTITIONER

## 2025-01-23 NOTE — PROGRESS NOTES
Cony Silvestre, was evaluated through a synchronous (real-time) audio-video encounter. The patient (or guardian if applicable) is aware that this is a billable service, which includes applicable co-pays. This Virtual Visit was conducted with patient's (and/or legal guardian's) consent. Patient identification was verified, and a caregiver was present when appropriate.   The patient was located at Home: [address] [facility] [address]  Provider was located at Home [place]Appt [place] State[place]  Confirm you are appropriately licensed, registered, or certified to deliver care in the state where the patient is located as indicated above. If you are not or unsure, please re-schedule the visit: Yes, I confirm.     Cony Silvestre (:  1995) is a Established patient, presenting virtually for evaluation of the following:      Below is the assessment and plan developed based on review of pertinent history, physical exam, labs, studies, and medications.     Assessment & Plan  Chest wall pain   ?scar tissue from mastectomy  Will chest cxr  Fu in person INI    Orders:    XR CHEST (2 VW); Future    History of breast cancer       Orders:    XR CHEST (2 VW); Future      No follow-ups on file.       Subjective   HPI  Review of Systems    Chest wall pain  No palpable lumps  Hx of breast cancer, lost to fu w heme-onc but has appt at the end of next month  She reports having a double mastectomy  And completed tamoxifen    She is in a substance abuse program now, recent hx of crack cocaine useage       Objective   Patient-Reported Vitals  No data recorded     Physical Exam  [INSTRUCTIONS:  \"[x]\" Indicates a positive item  \"[]\" Indicates a negative item  -- DELETE ALL ITEMS NOT EXAMINED]    Constitutional: [x] Appears well-developed and well-nourished [x] No apparent distress      [] Abnormal -     Mental status: [x] Alert and awake  [x] Oriented to person/place/time [x] Able to follow commands    [] Abnormal -     Eyes:   EOM    [x]

## 2025-02-04 ENCOUNTER — PATIENT MESSAGE (OUTPATIENT)
Facility: CLINIC | Age: 30
End: 2025-02-04

## 2025-02-06 ENCOUNTER — TRANSCRIBE ORDERS (OUTPATIENT)
Facility: HOSPITAL | Age: 30
End: 2025-02-06

## 2025-02-06 ENCOUNTER — HOSPITAL ENCOUNTER (OUTPATIENT)
Facility: HOSPITAL | Age: 30
Setting detail: SPECIMEN
Discharge: HOME | End: 2025-02-09
Payer: COMMERCIAL

## 2025-02-06 ENCOUNTER — OFFICE VISIT (OUTPATIENT)
Facility: CLINIC | Age: 30
End: 2025-02-06

## 2025-02-06 ENCOUNTER — HOSPITAL ENCOUNTER (OUTPATIENT)
Facility: HOSPITAL | Age: 30
Discharge: HOME | End: 2025-02-09
Payer: COMMERCIAL

## 2025-02-06 VITALS
SYSTOLIC BLOOD PRESSURE: 91 MMHG | HEART RATE: 76 BPM | WEIGHT: 172.2 LBS | OXYGEN SATURATION: 98 % | BODY MASS INDEX: 31.69 KG/M2 | DIASTOLIC BLOOD PRESSURE: 60 MMHG | HEIGHT: 62 IN | TEMPERATURE: 97.9 F | RESPIRATION RATE: 18 BRPM

## 2025-02-06 DIAGNOSIS — Z12.4 CERVICAL CANCER SCREENING: Primary | ICD-10-CM

## 2025-02-06 DIAGNOSIS — Z85.3 HISTORY OF BREAST CANCER: ICD-10-CM

## 2025-02-06 DIAGNOSIS — R07.89 CHEST WALL PAIN: ICD-10-CM

## 2025-02-06 DIAGNOSIS — R07.89 CHEST WALL PAIN: Primary | ICD-10-CM

## 2025-02-06 DIAGNOSIS — D50.9 IRON DEFICIENCY ANEMIA, UNSPECIFIED IRON DEFICIENCY ANEMIA TYPE: ICD-10-CM

## 2025-02-06 DIAGNOSIS — Z11.3 SCREENING EXAMINATION FOR STD (SEXUALLY TRANSMITTED DISEASE): ICD-10-CM

## 2025-02-06 DIAGNOSIS — F33.1 MAJOR DEPRESSIVE DISORDER, RECURRENT, MODERATE (HCC): ICD-10-CM

## 2025-02-06 PROBLEM — T18.9XXA INGESTION OF FOREIGN BODY, INITIAL ENCOUNTER: Status: RESOLVED | Noted: 2024-11-16 | Resolved: 2025-02-06

## 2025-02-06 PROCEDURE — 87661 TRICHOMONAS VAGINALIS AMPLIF: CPT

## 2025-02-06 PROCEDURE — 87624 HPV HI-RISK TYP POOLED RSLT: CPT

## 2025-02-06 PROCEDURE — 71046 X-RAY EXAM CHEST 2 VIEWS: CPT

## 2025-02-06 PROCEDURE — 88175 CYTOPATH C/V AUTO FLUID REDO: CPT

## 2025-02-06 PROCEDURE — 87591 N.GONORRHOEAE DNA AMP PROB: CPT

## 2025-02-06 PROCEDURE — 87491 CHLMYD TRACH DNA AMP PROBE: CPT

## 2025-02-06 RX ORDER — HYDROXYZINE HYDROCHLORIDE 25 MG/1
25 TABLET, FILM COATED ORAL 3 TIMES DAILY
COMMUNITY
Start: 2025-01-13

## 2025-02-06 RX ORDER — DULOXETIN HYDROCHLORIDE 30 MG/1
CAPSULE, DELAYED RELEASE ORAL
COMMUNITY
End: 2025-02-06

## 2025-02-06 RX ORDER — ESCITALOPRAM OXALATE 20 MG/1
TABLET ORAL
COMMUNITY
End: 2025-02-06

## 2025-02-06 SDOH — ECONOMIC STABILITY: FOOD INSECURITY: WITHIN THE PAST 12 MONTHS, THE FOOD YOU BOUGHT JUST DIDN'T LAST AND YOU DIDN'T HAVE MONEY TO GET MORE.: NEVER TRUE

## 2025-02-06 SDOH — ECONOMIC STABILITY: FOOD INSECURITY: WITHIN THE PAST 12 MONTHS, YOU WORRIED THAT YOUR FOOD WOULD RUN OUT BEFORE YOU GOT MONEY TO BUY MORE.: NEVER TRUE

## 2025-02-06 ASSESSMENT — PATIENT HEALTH QUESTIONNAIRE - PHQ9
SUM OF ALL RESPONSES TO PHQ9 QUESTIONS 1 & 2: 2
10. IF YOU CHECKED OFF ANY PROBLEMS, HOW DIFFICULT HAVE THESE PROBLEMS MADE IT FOR YOU TO DO YOUR WORK, TAKE CARE OF THINGS AT HOME, OR GET ALONG WITH OTHER PEOPLE: SOMEWHAT DIFFICULT
SUM OF ALL RESPONSES TO PHQ QUESTIONS 1-9: 13
SUM OF ALL RESPONSES TO PHQ QUESTIONS 1-9: 13
2. FEELING DOWN, DEPRESSED OR HOPELESS: SEVERAL DAYS
3. TROUBLE FALLING OR STAYING ASLEEP: NEARLY EVERY DAY
1. LITTLE INTEREST OR PLEASURE IN DOING THINGS: SEVERAL DAYS
SUM OF ALL RESPONSES TO PHQ QUESTIONS 1-9: 13
7. TROUBLE CONCENTRATING ON THINGS, SUCH AS READING THE NEWSPAPER OR WATCHING TELEVISION: SEVERAL DAYS
SUM OF ALL RESPONSES TO PHQ QUESTIONS 1-9: 13
4. FEELING TIRED OR HAVING LITTLE ENERGY: NEARLY EVERY DAY
9. THOUGHTS THAT YOU WOULD BE BETTER OFF DEAD, OR OF HURTING YOURSELF: NOT AT ALL
5. POOR APPETITE OR OVEREATING: NEARLY EVERY DAY
6. FEELING BAD ABOUT YOURSELF - OR THAT YOU ARE A FAILURE OR HAVE LET YOURSELF OR YOUR FAMILY DOWN: NOT AT ALL
8. MOVING OR SPEAKING SO SLOWLY THAT OTHER PEOPLE COULD HAVE NOTICED. OR THE OPPOSITE, BEING SO FIGETY OR RESTLESS THAT YOU HAVE BEEN MOVING AROUND A LOT MORE THAN USUAL: SEVERAL DAYS

## 2025-02-06 NOTE — PROGRESS NOTES
Subjective: (As above and below)     Chief Complaint   Patient presents with    Gynecologic Exam     Cony Silvestre is a 29 y.o. year old female who presents for cervical cancer screening    Overall she reports feeling well.  Last menses 1/17    Wishes for routine STI testing    Continues to work on recovery, doing well    Recent labs- mild anemia    Hx of breast ca: follows w breast center      Reviewed PmHx, RxHx, FmHx, SocHx, AllgHx and updated in chart.  Family History   Problem Relation Age of Onset    Psychiatric Disorder Mother     Psychiatric Disorder Sister     Hypertension Mother        Past Medical History:   Diagnosis Date    Anxiety     Cancer (HCC) 2019    RIGHT BREAST    Depression     Ingestion of foreign body, initial encounter 11/16/2024    PTSD (post-traumatic stress disorder)     Suicidal intent       Social History     Socioeconomic History    Marital status: Single     Spouse name: None    Number of children: None    Years of education: None    Highest education level: None   Tobacco Use    Smoking status: Former    Smokeless tobacco: Former     Quit date: 12/17/2019   Substance and Sexual Activity    Alcohol use: Yes    Drug use: No   Social History Narrative    9/12/17: unemployed, 18 mo daughter     Social Determinants of Health     Food Insecurity: No Food Insecurity (2/6/2025)    Hunger Vital Sign     Worried About Running Out of Food in the Last Year: Never true     Ran Out of Food in the Last Year: Never true   Transportation Needs: Unmet Transportation Needs (2/6/2025)    PRAPARE - Transportation     Lack of Transportation (Medical): Yes     Lack of Transportation (Non-Medical): Yes   Housing Stability: Low Risk  (2/6/2025)    Housing Stability Vital Sign     Unable to Pay for Housing in the Last Year: No     Number of Times Moved in the Last Year: 0     Homeless in the Last Year: No          Current Outpatient Medications   Medication Sig    hydrOXYzine HCl (ATARAX) 25 MG tablet Take 1

## 2025-02-06 NOTE — PROGRESS NOTES
\"Have you been to the ER, urgent care clinic since your last visit?  Hospitalized since your last visit?\"    NO  “Have you seen or consulted any other health care providers outside our system since your last visit?”    NO     “Have you had a pap smear?”    NO    No cervical cancer screening on file            Chief Complaint   Patient presents with    Gynecologic Exam     BP 91/60 (Site: Left Upper Arm, Position: Sitting, Cuff Size: Medium Adult)   Pulse 76   Temp 97.9 °F (36.6 °C) (Oral)   Resp 18   Ht 1.575 m (5' 2\")   Wt 78.1 kg (172 lb 3.2 oz)   LMP 01/17/2025   SpO2 98%   BMI 31.50 kg/m²

## 2025-02-07 LAB
ALBUMIN SERPL-MCNC: 3.8 G/DL (ref 3.5–5)
ALBUMIN/GLOB SERPL: 1.1 (ref 1.1–2.2)
ALP SERPL-CCNC: 65 U/L (ref 45–117)
ALT SERPL-CCNC: 16 U/L (ref 12–78)
ANION GAP SERPL CALC-SCNC: 7 MMOL/L (ref 2–12)
AST SERPL-CCNC: 14 U/L (ref 15–37)
BILIRUB SERPL-MCNC: 0.4 MG/DL (ref 0.2–1)
BUN SERPL-MCNC: 15 MG/DL (ref 6–20)
BUN/CREAT SERPL: 15 (ref 12–20)
CALCIUM SERPL-MCNC: 10 MG/DL (ref 8.5–10.1)
CHLORIDE SERPL-SCNC: 103 MMOL/L (ref 97–108)
CHOLEST SERPL-MCNC: 172 MG/DL
CO2 SERPL-SCNC: 26 MMOL/L (ref 21–32)
CREAT SERPL-MCNC: 1.03 MG/DL (ref 0.55–1.02)
ERYTHROCYTE [DISTWIDTH] IN BLOOD BY AUTOMATED COUNT: 15.1 % (ref 11.5–14.5)
FERRITIN SERPL-MCNC: 6 NG/ML (ref 8–252)
FOLATE SERPL-MCNC: 10.9 NG/ML (ref 5–21)
GLOBULIN SER CALC-MCNC: 3.5 G/DL (ref 2–4)
GLUCOSE SERPL-MCNC: 86 MG/DL (ref 65–100)
HCT VFR BLD AUTO: 33.4 % (ref 35–47)
HDLC SERPL-MCNC: 88 MG/DL
HDLC SERPL: 2 (ref 0–5)
HGB BLD-MCNC: 10.4 G/DL (ref 11.5–16)
HIV 1+2 AB+HIV1 P24 AG SERPL QL IA: NONREACTIVE
HIV 1/2 RESULT COMMENT: NORMAL
HPV I/H RISK 1 DNA CVX QL PROBE+SIG AMP: NEGATIVE
LDLC SERPL CALC-MCNC: 70.2 MG/DL (ref 0–100)
MCH RBC QN AUTO: 21.8 PG (ref 26–34)
MCHC RBC AUTO-ENTMCNC: 31.1 G/DL (ref 30–36.5)
MCV RBC AUTO: 70 FL (ref 80–99)
NRBC # BLD: 0 K/UL (ref 0–0.01)
NRBC BLD-RTO: 0 PER 100 WBC
PLATELET # BLD AUTO: 151 K/UL (ref 150–400)
POTASSIUM SERPL-SCNC: 4 MMOL/L (ref 3.5–5.1)
PROT SERPL-MCNC: 7.3 G/DL (ref 6.4–8.2)
RBC # BLD AUTO: 4.77 M/UL (ref 3.8–5.2)
SODIUM SERPL-SCNC: 136 MMOL/L (ref 136–145)
TRIGL SERPL-MCNC: 69 MG/DL
VIT B12 SERPL-MCNC: 226 PG/ML (ref 193–986)
VLDLC SERPL CALC-MCNC: 13.8 MG/DL
WBC # BLD AUTO: 10.4 K/UL (ref 3.6–11)

## 2025-02-08 LAB
HCV AB SERPL QL IA: NORMAL
HCV IGG SERPL QL IA: NON REACTIVE S/CO RATIO

## 2025-02-10 LAB
C TRACH RRNA SPEC QL NAA+PROBE: NEGATIVE
N GONORRHOEA RRNA SPEC QL NAA+PROBE: NEGATIVE
SPECIMEN SOURCE: NORMAL
T PALLIDUM AB SER QL IA: NON REACTIVE
T VAGINALIS RRNA SPEC QL NAA+PROBE: NEGATIVE

## 2025-02-12 ENCOUNTER — TELEPHONE (OUTPATIENT)
Facility: CLINIC | Age: 30
End: 2025-02-12

## 2025-02-12 NOTE — TELEPHONE ENCOUNTER
Returned pt call; pt states don't have heavy periods also haven't taken any iron supplements in years.

## 2025-02-12 NOTE — TELEPHONE ENCOUNTER
----- Message from OMER Davidson NP sent at 2/11/2025  1:10 PM EST -----  Please let her know that her iron levels are very low when is the last time she took any iron supplements and did she do okay with them?  Are her cycles very heavy?  ----- Message -----  From: Roberto Carlos Cm Incoming Jhon W/Luisa Micro  Sent: 2/7/2025   1:36 PM EST  To: OMER Limon NP

## 2025-02-13 RX ORDER — METRONIDAZOLE 7.5 MG/G
1 GEL VAGINAL NIGHTLY
Qty: 5 EACH | Refills: 0 | Status: SHIPPED | OUTPATIENT
Start: 2025-02-13 | End: 2025-02-18

## 2025-02-13 RX ORDER — FLUCONAZOLE 150 MG/1
150 TABLET ORAL ONCE
Qty: 1 TABLET | Refills: 0 | Status: SHIPPED | OUTPATIENT
Start: 2025-02-13 | End: 2025-02-13

## 2025-02-20 DIAGNOSIS — D50.9 IRON DEFICIENCY ANEMIA, UNSPECIFIED IRON DEFICIENCY ANEMIA TYPE: Primary | ICD-10-CM

## 2025-02-20 RX ORDER — FERROUS SULFATE 325(65) MG
325 TABLET ORAL
Qty: 90 TABLET | Refills: 1 | Status: SHIPPED | OUTPATIENT
Start: 2025-02-20

## 2025-02-25 RX ORDER — FLUCONAZOLE 150 MG/1
150 TABLET ORAL ONCE
Qty: 1 TABLET | Refills: 0 | Status: SHIPPED | OUTPATIENT
Start: 2025-02-25 | End: 2025-02-25

## (undated) DEVICE — DRAPE XR C ARM 41X74IN LF --

## (undated) DEVICE — TELFA NON-ADHERENT PADS PREPAK: Brand: TELFA

## (undated) DEVICE — INSULATED BLADE ELECTRODE: Brand: EDGE

## (undated) DEVICE — 3M™ TEGADERM™ TRANSPARENT FILM DRESSING FRAME STYLE, 1626W, 4 IN X 4-3/4 IN (10 CM X 12 CM), 50/CT 4CT/CASE: Brand: 3M™ TEGADERM™

## (undated) DEVICE — SURGICAL PROCEDURE PACK BASIN MAJ SET CUST NO CAUT

## (undated) DEVICE — REM POLYHESIVE ADULT PATIENT RETURN ELECTRODE: Brand: VALLEYLAB

## (undated) DEVICE — SUTURE PERMAHAND SZ 2-0 L30IN NONABSORBABLE BLK SILK W/O A305H

## (undated) DEVICE — STERILE POLYISOPRENE POWDER-FREE SURGICAL GLOVES WITH EMOLLIENT COATING: Brand: PROTEXIS

## (undated) DEVICE — HANDLE LT SNAP ON ULT DURABLE LENS FOR TRUMPF ALC DISPOSABLE

## (undated) DEVICE — NEEDLE HYPO 25GA L1.5IN BVL ORIENTED ECLIPSE

## (undated) DEVICE — DRAPE,CHEST,FENES,15X10,STERIL: Brand: MEDLINE

## (undated) DEVICE — Device

## (undated) DEVICE — SUT PROL 3-0 36IN SH DA BLU --

## (undated) DEVICE — PACK,BASIC,SIRUS,V: Brand: MEDLINE

## (undated) DEVICE — INFECTION CONTROL KIT SYS

## (undated) DEVICE — TOWEL SURG W17XL27IN STD BLU COT NONFENESTRATED PREWASHED

## (undated) DEVICE — APPLICATOR BNDG 1MM ADH PREMIERPRO EXOFIN

## (undated) DEVICE — SOLUTION IV 500ML 0.9% SOD CHL FLX CONT

## (undated) DEVICE — SUTURE MCRYL SZ 4-0 L27IN ABSRB UD L19MM PS-2 1/2 CIR PRIM Y426H

## (undated) DEVICE — (D)PREP SKN CHLRAPRP APPL 26ML -- CONVERT TO ITEM 371833

## (undated) DEVICE — KENDALL SCD EXPRESS SLEEVES, KNEE LENGTH, MEDIUM: Brand: KENDALL SCD

## (undated) DEVICE — ROCKER SWITCH PENCIL BLADE ELECTRODE, HOLSTER: Brand: EDGE

## (undated) DEVICE — (D)STRIP SKN CLSR 0.5X4IN WHT --

## (undated) DEVICE — SUTURE VCRL SZ 3-0 L27IN ABSRB UD L26MM SH 1/2 CIR J416H

## (undated) DEVICE — SYR 10ML LUER LOK 1/5ML GRAD --

## (undated) DEVICE — DRAPE PRB US TRNSDCR 6X96IN --